# Patient Record
Sex: FEMALE | Race: WHITE | NOT HISPANIC OR LATINO | Employment: FULL TIME | URBAN - METROPOLITAN AREA
[De-identification: names, ages, dates, MRNs, and addresses within clinical notes are randomized per-mention and may not be internally consistent; named-entity substitution may affect disease eponyms.]

---

## 2018-04-26 ENCOUNTER — TRANSCRIBE ORDERS (OUTPATIENT)
Dept: ADMINISTRATIVE | Facility: HOSPITAL | Age: 33
End: 2018-04-26

## 2018-04-26 DIAGNOSIS — R22.0 LOCALIZED SWELLING, MASS AND LUMP, HEAD: Primary | ICD-10-CM

## 2018-04-27 ENCOUNTER — HOSPITAL ENCOUNTER (OUTPATIENT)
Dept: ULTRASOUND IMAGING | Facility: HOSPITAL | Age: 33
Discharge: HOME | End: 2018-04-27
Payer: COMMERCIAL

## 2018-04-27 DIAGNOSIS — R22.0 LOCALIZED SWELLING, MASS AND LUMP, HEAD: ICD-10-CM

## 2018-04-27 PROCEDURE — 76536 US EXAM OF HEAD AND NECK: CPT

## 2019-11-08 ENCOUNTER — HOSPITAL ENCOUNTER (EMERGENCY)
Facility: HOSPITAL | Age: 34
Discharge: HOME | End: 2019-11-08
Attending: EMERGENCY MEDICINE | Admitting: EMERGENCY MEDICINE
Payer: COMMERCIAL

## 2019-11-08 ENCOUNTER — APPOINTMENT (EMERGENCY)
Dept: CT IMAGING | Facility: HOSPITAL | Age: 34
End: 2019-11-08
Payer: COMMERCIAL

## 2019-11-08 VITALS
RESPIRATION RATE: 16 BRPM | OXYGEN SATURATION: 98 % | HEART RATE: 78 BPM | TEMPERATURE: 98.6 F | SYSTOLIC BLOOD PRESSURE: 119 MMHG | DIASTOLIC BLOOD PRESSURE: 78 MMHG

## 2019-11-08 DIAGNOSIS — K52.9 GASTROENTERITIS: Primary | ICD-10-CM

## 2019-11-08 LAB
ALBUMIN SERPL BCP-MCNC: 3.6 G/DL (ref 3.5–5)
ALP SERPL-CCNC: 68 U/L (ref 46–116)
ALT SERPL W P-5'-P-CCNC: 15 U/L (ref 12–78)
ANION GAP SERPL CALCULATED.3IONS-SCNC: 10 MMOL/L (ref 4–13)
AST SERPL W P-5'-P-CCNC: 14 U/L (ref 5–45)
B-HCG SERPL-ACNC: <2 MIU/ML
BASOPHILS # BLD AUTO: 0.03 THOUSANDS/ΜL (ref 0–0.1)
BASOPHILS NFR BLD AUTO: 0 % (ref 0–1)
BILIRUB SERPL-MCNC: 0.4 MG/DL (ref 0.2–1)
BILIRUB UR QL STRIP: NEGATIVE
BUN SERPL-MCNC: 9 MG/DL (ref 5–25)
CALCIUM SERPL-MCNC: 8.7 MG/DL (ref 8.3–10.1)
CHLORIDE SERPL-SCNC: 103 MMOL/L (ref 100–108)
CLARITY UR: CLEAR
CO2 SERPL-SCNC: 25 MMOL/L (ref 21–32)
COLOR UR: YELLOW
CREAT SERPL-MCNC: 0.9 MG/DL (ref 0.6–1.3)
EOSINOPHIL # BLD AUTO: 0.12 THOUSAND/ΜL (ref 0–0.61)
EOSINOPHIL NFR BLD AUTO: 2 % (ref 0–6)
ERYTHROCYTE [DISTWIDTH] IN BLOOD BY AUTOMATED COUNT: 13.5 % (ref 11.6–15.1)
EXT PREG TEST URINE: NORMAL
EXT. CONTROL ED NAV: NORMAL
GFR SERPL CREATININE-BSD FRML MDRD: 83 ML/MIN/1.73SQ M
GLUCOSE SERPL-MCNC: 102 MG/DL (ref 65–140)
GLUCOSE UR STRIP-MCNC: NEGATIVE MG/DL
HCT VFR BLD AUTO: 39.6 % (ref 34.8–46.1)
HGB BLD-MCNC: 13 G/DL (ref 11.5–15.4)
HGB UR QL STRIP.AUTO: NEGATIVE
IMM GRANULOCYTES # BLD AUTO: 0.01 THOUSAND/UL (ref 0–0.2)
IMM GRANULOCYTES NFR BLD AUTO: 0 % (ref 0–2)
KETONES UR STRIP-MCNC: NEGATIVE MG/DL
LEUKOCYTE ESTERASE UR QL STRIP: NEGATIVE
LIPASE SERPL-CCNC: 289 U/L (ref 73–393)
LYMPHOCYTES # BLD AUTO: 1.99 THOUSANDS/ΜL (ref 0.6–4.47)
LYMPHOCYTES NFR BLD AUTO: 30 % (ref 14–44)
MCH RBC QN AUTO: 29 PG (ref 26.8–34.3)
MCHC RBC AUTO-ENTMCNC: 32.8 G/DL (ref 31.4–37.4)
MCV RBC AUTO: 88 FL (ref 82–98)
MONOCYTES # BLD AUTO: 0.43 THOUSAND/ΜL (ref 0.17–1.22)
MONOCYTES NFR BLD AUTO: 6 % (ref 4–12)
NEUTROPHILS # BLD AUTO: 4.12 THOUSANDS/ΜL (ref 1.85–7.62)
NEUTS SEG NFR BLD AUTO: 62 % (ref 43–75)
NITRITE UR QL STRIP: NEGATIVE
NRBC BLD AUTO-RTO: 0 /100 WBCS
PH UR STRIP.AUTO: 6 [PH] (ref 4.5–8)
PLATELET # BLD AUTO: 282 THOUSANDS/UL (ref 149–390)
PMV BLD AUTO: 9.9 FL (ref 8.9–12.7)
POTASSIUM SERPL-SCNC: 3.4 MMOL/L (ref 3.5–5.3)
PROT SERPL-MCNC: 7.9 G/DL (ref 6.4–8.2)
PROT UR STRIP-MCNC: NEGATIVE MG/DL
RBC # BLD AUTO: 4.48 MILLION/UL (ref 3.81–5.12)
SODIUM SERPL-SCNC: 138 MMOL/L (ref 136–145)
SP GR UR STRIP.AUTO: 1.01 (ref 1–1.03)
UROBILINOGEN UR QL STRIP.AUTO: 0.2 E.U./DL
WBC # BLD AUTO: 6.7 THOUSAND/UL (ref 4.31–10.16)

## 2019-11-08 PROCEDURE — 96361 HYDRATE IV INFUSION ADD-ON: CPT

## 2019-11-08 PROCEDURE — 83690 ASSAY OF LIPASE: CPT | Performed by: EMERGENCY MEDICINE

## 2019-11-08 PROCEDURE — 36415 COLL VENOUS BLD VENIPUNCTURE: CPT | Performed by: EMERGENCY MEDICINE

## 2019-11-08 PROCEDURE — 84702 CHORIONIC GONADOTROPIN TEST: CPT | Performed by: EMERGENCY MEDICINE

## 2019-11-08 PROCEDURE — 80053 COMPREHEN METABOLIC PANEL: CPT | Performed by: EMERGENCY MEDICINE

## 2019-11-08 PROCEDURE — 81003 URINALYSIS AUTO W/O SCOPE: CPT

## 2019-11-08 PROCEDURE — 96360 HYDRATION IV INFUSION INIT: CPT

## 2019-11-08 PROCEDURE — 81025 URINE PREGNANCY TEST: CPT | Performed by: EMERGENCY MEDICINE

## 2019-11-08 PROCEDURE — 85025 COMPLETE CBC W/AUTO DIFF WBC: CPT | Performed by: EMERGENCY MEDICINE

## 2019-11-08 PROCEDURE — 99284 EMERGENCY DEPT VISIT MOD MDM: CPT | Performed by: EMERGENCY MEDICINE

## 2019-11-08 PROCEDURE — 99284 EMERGENCY DEPT VISIT MOD MDM: CPT

## 2019-11-08 PROCEDURE — 74177 CT ABD & PELVIS W/CONTRAST: CPT

## 2019-11-08 RX ORDER — DICYCLOMINE HCL 20 MG
20 TABLET ORAL EVERY 6 HOURS
Qty: 20 TABLET | Refills: 0 | Status: SHIPPED | OUTPATIENT
Start: 2019-11-08 | End: 2019-11-08 | Stop reason: SDUPTHER

## 2019-11-08 RX ORDER — ONDANSETRON 2 MG/ML
4 INJECTION INTRAMUSCULAR; INTRAVENOUS ONCE
Status: DISCONTINUED | OUTPATIENT
Start: 2019-11-08 | End: 2019-11-08 | Stop reason: HOSPADM

## 2019-11-08 RX ORDER — DICYCLOMINE HCL 20 MG
20 TABLET ORAL EVERY 6 HOURS
Qty: 20 TABLET | Refills: 0 | Status: SHIPPED | OUTPATIENT
Start: 2019-11-08

## 2019-11-08 RX ORDER — ONDANSETRON 4 MG/1
4 TABLET, FILM COATED ORAL EVERY 8 HOURS PRN
Qty: 12 TABLET | Refills: 0 | Status: SHIPPED | OUTPATIENT
Start: 2019-11-08 | End: 2019-11-08 | Stop reason: SDUPTHER

## 2019-11-08 RX ORDER — KETOROLAC TROMETHAMINE 30 MG/ML
15 INJECTION, SOLUTION INTRAMUSCULAR; INTRAVENOUS ONCE
Status: DISCONTINUED | OUTPATIENT
Start: 2019-11-08 | End: 2019-11-08 | Stop reason: HOSPADM

## 2019-11-08 RX ORDER — ONDANSETRON 4 MG/1
4 TABLET, FILM COATED ORAL EVERY 8 HOURS PRN
Qty: 12 TABLET | Refills: 0 | Status: SHIPPED | OUTPATIENT
Start: 2019-11-08 | End: 2019-12-23 | Stop reason: ALTCHOICE

## 2019-11-08 RX ADMIN — IOHEXOL 100 ML: 350 INJECTION, SOLUTION INTRAVENOUS at 15:43

## 2019-11-08 RX ADMIN — SODIUM CHLORIDE 1000 ML: 0.9 INJECTION, SOLUTION INTRAVENOUS at 15:04

## 2019-11-08 NOTE — ED PROVIDER NOTES
History  Chief Complaint   Patient presents with    Abdominal Pain     Pt c/o RLQ abdominal pain since last night  (+) Nausea  Vomiting last night  (+) Diarrhea  27-year-old female comes in for evaluation of abdominal pain patient states that last evening she began to have right lower quadrant abdominal pain that radiated into her right back  Nausea and vomiting and diarrhea as well multiple episodes both yesterday and today  Patient denies fever  Patient does complain of some groin pain as well  Patient does have guarding and rebound exam differential includes kidney stone acute appendicitis ovarian torsion UTI pyelonephritis  History provided by:  Patient   used: No    Abdominal Pain   Pain location:  RLQ  Pain quality: sharp and stabbing    Pain radiates to:  R flank  Pain severity:  Severe  Onset quality:  Sudden  Duration:  1 day  Timing:  Constant  Progression:  Worsening  Chronicity:  New  Context: not alcohol use, not recent illness and not trauma    Ineffective treatments:  None tried  Associated symptoms: diarrhea, nausea and vomiting    Associated symptoms: no anorexia, no chest pain, no cough, no fatigue, no fever, no hematuria and no shortness of breath    Risk factors: no alcohol abuse, no NSAID use and not pregnant        Prior to Admission Medications   Prescriptions Last Dose Informant Patient Reported? Taking? LACTOBACILLUS PO   Yes No   Sig: Take 1 tablet by mouth 2 (two) times a day  Linaclotide (LINZESS) 145 MCG CAPS   Yes No   Sig: Take by mouth Indications: unsure of dose  amoxicillin-clavulanate (AUGMENTIN) 875-125 mg per tablet   Yes No   Sig: Take 1 tablet by mouth 2 (two) times a day  drospirenone-ethinyl estradiol (PIPER) 3-0 02 MG per tablet   Yes No   Sig: Take 1 tablet by mouth daily  levothyroxine 200 mcg tablet   Yes No   Sig: Take 200 mcg by mouth daily  losartan (COZAAR) 50 mg tablet   Yes No   Sig: Take 50 mg by mouth daily  Facility-Administered Medications: None       Past Medical History:   Diagnosis Date    B12 deficiency     Disease of thyroid gland     Hypertension     Sjogren's disease ([facility] [address] )        Past Surgical History:   Procedure Laterality Date    EGD AND COLONOSCOPY N/A 3/25/2016    Procedure: EGD AND COLONOSCOPY;  Surgeon: Duncan Quiros MD;  Location: [facility]; Service:        History reviewed  No pertinent family history  I have reviewed and agree with the history as documented  Social History     Tobacco Use    Smoking status: Current Some Day Smoker   Substance Use Topics    Alcohol use: Yes    Drug use: No        Review of Systems   Constitutional: Negative for fatigue and fever  HENT: Negative for congestion and ear pain  Eyes: Negative for discharge and redness  Respiratory: Negative for apnea, cough, shortness of breath and wheezing  Cardiovascular: Negative for chest pain  Gastrointestinal: Positive for abdominal pain, diarrhea, nausea and vomiting  Negative for anorexia  Endocrine: Negative for cold intolerance and polydipsia  Genitourinary: Negative for difficulty urinating and hematuria  Musculoskeletal: Negative for arthralgias and back pain  Skin: Negative for color change and rash  Allergic/Immunologic: Negative for environmental allergies and immunocompromised state  Neurological: Negative for numbness and headaches  Hematological: Negative for adenopathy  Does not bruise/bleed easily  Psychiatric/Behavioral: Negative for agitation and behavioral problems  Physical Exam  Physical Exam   Constitutional: She is oriented to person, place, and time  Vital signs are normal  She appears well-developed and well-nourished  Non-toxic appearance  HENT:   Head: Normocephalic and atraumatic     Right Ear: Tympanic membrane and external ear normal    Left Ear: Tympanic membrane and external ear normal    Nose: Nose normal  No rhinorrhea, sinus tenderness or nasal deformity  Mouth/Throat: Uvula is midline and oropharynx is clear and moist  Normal dentition  Eyes: Pupils are equal, round, and reactive to light  Conjunctivae, EOM and lids are normal  Right eye exhibits no discharge  Left eye exhibits no discharge  Neck: Trachea normal and normal range of motion  Neck supple  No JVD present  Carotid bruit is not present  Cardiovascular: Normal rate, regular rhythm, intact distal pulses and normal pulses  No extrasystoles are present  PMI is not displaced  Pulmonary/Chest: Effort normal and breath sounds normal  No accessory muscle usage  No respiratory distress  She has no wheezes  She has no rhonchi  She has no rales  Abdominal: Soft  Normal appearance and bowel sounds are normal  She exhibits no mass  There is tenderness in the right lower quadrant  There is guarding and CVA tenderness  There is no rigidity and no rebound  Musculoskeletal:        Right shoulder: She exhibits normal range of motion, no bony tenderness, no swelling and no deformity  Cervical back: Normal  She exhibits normal range of motion, no tenderness, no bony tenderness and no deformity  Lymphadenopathy:     She has no cervical adenopathy  She has no axillary adenopathy  Neurological: She is alert and oriented to person, place, and time  She has normal strength and normal reflexes  No cranial nerve deficit or sensory deficit  GCS eye subscore is 4  GCS verbal subscore is 5  GCS motor subscore is 6  Skin: Skin is warm and dry  No rash noted  Psychiatric: She has a normal mood and affect  Her speech is normal and behavior is normal    Nursing note and vitals reviewed        Vital Signs  ED Triage Vitals [11/08/19 1340]   Temperature Pulse Respirations Blood Pressure SpO2   98 6 °F (37 °C) 72 16 (!) 147/106 97 %      Temp Source Heart Rate Source Patient Position - Orthostatic VS BP Location FiO2 (%)   Oral Monitor Sitting Left arm --      Pain Score       7           Vitals: 11/08/19 1340 11/08/19 1713   BP: (!) 147/106 119/78   Pulse: 72 78   Patient Position - Orthostatic VS: Sitting Sitting         Visual Acuity      ED Medications  Medications   sodium chloride 0 9 % bolus 1,000 mL (0 mL Intravenous Stopped 11/8/19 1713)   iohexol (OMNIPAQUE) 350 MG/ML injection (MULTI-DOSE) 100 mL (100 mL Intravenous Given 11/8/19 1543)       Diagnostic Studies  Results Reviewed     Procedure Component Value Units Date/Time    ED Urine Macroscopic [[id number]] Collected:  11/08/19 1547    Lab Status:  Final result Specimen:  Urine Updated:  11/08/19 1549     Color, UA Yellow     Clarity, UA Clear     pH, UA 6 0     Leukocytes, UA Negative     Nitrite, UA Negative     Protein, UA Negative mg/dl      Glucose, UA Negative mg/dl      Ketones, UA Negative mg/dl      Urobilinogen, UA 0 2 E U /dl      Bilirubin, UA Negative     Blood, UA Negative     Specific Gravity, UA 1 010    Narrative:       CLINITEK RESULT    POCT pregnancy, urine [[id number]]  (Normal) Resulted:  11/08/19 1544    Lab Status:  Final result Updated:  11/08/19 1544     EXT PREG TEST UR (Ref: Negative) negatvie     Control valid    hCG, quantitative [[id number]]  (Normal) Collected:  11/08/19 1452    Lab Status:  Final result Specimen:  Blood from Arm, Left Updated:  11/08/19 1521     HCG, Quant <2 mIU/mL     Narrative:        Expected Ranges:     Approximate               Approximate HCG  Gestation age          Concentration ( mIU/mL)  _____________          ______________________   Zavala Yawkey                      HCG values  0 2-1                       5-50  1-2                           2-3                         100-5000  3-4                         500-33293  4-5                         1000-55521  5-6                         [id number]  6-8                         79226-562030  8-12                        45140-894569      Lipase [[id number]]  (Normal) Collected:  11/08/19 1452    Lab Status:  Final result Specimen:  Blood from Arm, Left Updated:  11/08/19 1520     Lipase 289 u/L     Comprehensive metabolic panel [[id number]]  (Abnormal) Collected:  11/08/19 1452    Lab Status:  Final result Specimen:  Blood from Arm, Left Updated:  11/08/19 1513     Sodium 138 mmol/L      Potassium 3 4 mmol/L      Chloride 103 mmol/L      CO2 25 mmol/L      ANION GAP 10 mmol/L      BUN 9 mg/dL      Creatinine 0 90 mg/dL      Glucose 102 mg/dL      Calcium 8 7 mg/dL      AST 14 U/L      ALT 15 U/L      Alkaline Phosphatase 68 U/L      Total Protein 7 9 g/dL      Albumin 3 6 g/dL      Total Bilirubin 0 40 mg/dL      eGFR 83 ml/min/1 73sq m     Narrative:       [place] guidelines for Chronic Kidney Disease (CKD):     Stage 1 with normal or high GFR (GFR > 90 mL/min/1 73 square meters)    Stage 2 Mild CKD (GFR = 60-89 mL/min/1 73 square meters)    Stage 3A Moderate CKD (GFR = 45-59 mL/min/1 73 square meters)    Stage 3B Moderate CKD (GFR = 30-44 mL/min/1 73 square meters)    Stage 4 Severe CKD (GFR = 15-29 mL/min/1 73 square meters)    Stage 5 End Stage CKD (GFR <15 mL/min/1 73 square meters)  Note: GFR calculation is accurate only with a steady state creatinine    CBC and differential [[id number]] Collected:  11/08/19 1452    Lab Status:  Final result Specimen:  Blood from Arm, Left Updated:  11/08/19 1459     WBC 6 70 Thousand/uL      RBC 4 48 Million/uL      Hemoglobin 13 0 g/dL      Hematocrit 39 6 %      MCV 88 fL      MCH 29 0 pg      MCHC 32 8 g/dL      RDW 13 5 %      MPV 9 9 fL      Platelets 863 Thousands/uL      nRBC 0 /100 WBCs      Neutrophils Relative 62 %      Immat GRANS % 0 %      Lymphocytes Relative 30 %      Monocytes Relative 6 %      Eosinophils Relative 2 %      Basophils Relative 0 %      Neutrophils Absolute 4 12 Thousands/µL      Immature Grans Absolute 0 01 Thousand/uL      Lymphocytes Absolute 1 99 Thousands/µL      Monocytes Absolute 0 43 Thousand/µL      Eosinophils Absolute 0 12 Thousand/µL Basophils Absolute 0 03 Thousands/µL                  CT abdomen pelvis with contrast   Final Result by Dellis Scheuermann, MD (11/08 1627)      1  Fluid-filled loops of distal ileum, some of which are mildly thickened with mural edema  This may represent inflammatory or infectious enteritis  2   Subserosal uterine fibroid, increased from the prior study  3   Mild free fluid in the pelvis and right lower quadrant mesentery, possibly a combination of inflammatory and physiologic fluid  Workstation performed: [id number]                    Procedures  Procedures       ED Course                               MDM  Number of Diagnoses or Management Options  Gastroenteritis: new and requires workup     Amount and/or Complexity of Data Reviewed  Clinical lab tests: ordered and reviewed  Tests in the radiology section of CPT®: ordered and reviewed  Tests in the medicine section of CPT®: ordered and reviewed  Independent visualization of images, tracings, or specimens: yes    Patient Progress  Patient progress: stable      Disposition  Final diagnoses:   Gastroenteritis     Time reflects when diagnosis was documented in both MDM as applicable and the Disposition within this note     Time User Action Codes Description Comment    11/8/2019  4:45 PM Pool Valdez [K52 9] Gastroenteritis       ED Disposition     ED Disposition Condition Date/Time Comment    Discharge Stable Fri Nov 8, 2019  4:45 PM Genesis Damon discharge to home/self care              Follow-up Information     Follow up With Specialties Details Why Contact Info    Fan Padilla MD Family Medicine Schedule an appointment as soon as possible for a visit   [address] [place] [address] [address] [place] [address]   [phone]            Discharge Medication List as of 11/8/2019  5:09 PM      CONTINUE these medications which have CHANGED    Details   dicyclomine (BENTYL) 20 mg tablet Take 1 tablet (20 mg total) by mouth every 6 (six) hours For crampy abdominal pain, Starting Fri 11/8/2019, Print      ondansetron (ZOFRAN) 4 mg tablet Take 1 tablet (4 mg total) by mouth every 8 (eight) hours as needed for nausea or vomiting, Starting Fri 11/8/2019, Normal         CONTINUE these medications which have NOT CHANGED    Details   amoxicillin-clavulanate (AUGMENTIN) 875-125 mg per tablet Take 1 tablet by mouth 2 (two) times a day , Historical Med      drospirenone-ethinyl estradiol (PIPER) 3-0 02 MG per tablet Take 1 tablet by mouth daily  , Until Discontinued, Historical Med      LACTOBACILLUS PO Take 1 tablet by mouth 2 (two) times a day , Historical Med      levothyroxine 200 mcg tablet Take 200 mcg by mouth daily  , Until Discontinued, Historical Med      Linaclotide (LINZESS) 145 MCG CAPS Take by mouth Indications: unsure of dose , Until Discontinued, Historical Med      losartan (COZAAR) 50 mg tablet Take 50 mg by mouth daily  , Until Discontinued, Historical Med           No discharge procedures on file      ED Provider  Electronically Signed by           Brad Vizcarra DO  11/08/19 1329

## 2019-12-18 ENCOUNTER — OFFICE VISIT (OUTPATIENT)
Dept: GASTROENTEROLOGY | Facility: AMBULARY SURGERY CENTER | Age: 34
End: 2019-12-18
Payer: COMMERCIAL

## 2019-12-18 VITALS
TEMPERATURE: 98 F | BODY MASS INDEX: 31.28 KG/M2 | HEART RATE: 62 BPM | WEIGHT: 170 LBS | SYSTOLIC BLOOD PRESSURE: 110 MMHG | RESPIRATION RATE: 16 BRPM | HEIGHT: 62 IN | DIASTOLIC BLOOD PRESSURE: 62 MMHG

## 2019-12-18 DIAGNOSIS — K59.00 CONSTIPATION, UNSPECIFIED CONSTIPATION TYPE: ICD-10-CM

## 2019-12-18 DIAGNOSIS — R11.0 NAUSEA: ICD-10-CM

## 2019-12-18 DIAGNOSIS — K52.9 ENTERITIS: ICD-10-CM

## 2019-12-18 DIAGNOSIS — K62.5 RECTAL BLEEDING: ICD-10-CM

## 2019-12-18 DIAGNOSIS — R10.84 GENERALIZED ABDOMINAL PAIN: Primary | ICD-10-CM

## 2019-12-18 DIAGNOSIS — R13.19 ESOPHAGEAL DYSPHAGIA: ICD-10-CM

## 2019-12-18 PROBLEM — E03.9 ACQUIRED HYPOTHYROIDISM: Status: ACTIVE | Noted: 2017-03-27

## 2019-12-18 PROBLEM — R73.03 PRE-DIABETES: Status: ACTIVE | Noted: 2017-03-27

## 2019-12-18 PROCEDURE — 99204 OFFICE O/P NEW MOD 45 MIN: CPT | Performed by: INTERNAL MEDICINE

## 2019-12-18 RX ORDER — DROSPIRENONE AND ETHINYL ESTRADIOL 0.02-3(28)
1 KIT ORAL DAILY
COMMUNITY
Start: 2019-04-22 | End: 2019-12-23 | Stop reason: SDUPTHER

## 2019-12-18 RX ORDER — LEVOTHYROXINE SODIUM 100 MCG
TABLET ORAL
COMMUNITY
Start: 2019-11-30

## 2019-12-18 NOTE — PATIENT INSTRUCTIONS
Difficulty swallowing  - schedule EGD  - ask PCP for ENT referral voice changes    Nausea  - schedule EGD  - consider gastric empyting scan in the future    Constipation and abdominal pain  - start miralax 1 capful daily  If after 1 week no improvement, increase to twice per day     - start bisacodyl 5 mg tab every other day as needed for constipation  - start probiotic (Align and Florstar)  - consider trying IBguard (OTC supplement)    Eneteritis on CT  - schedule MR enterography    Follow up 3 months

## 2019-12-18 NOTE — PROGRESS NOTES
[facility]  Luke Colon 27 y o  female MRN: [id number]  Encounter: [id number]          ASSESSMENT AND PLAN:    1  Esophageal dysphagia   - ddx; EoE, esophagitis, stricture/web  - schedule EGD with esophageal bx  - PCP to refer to  ENT for voice changes    2  Nausea   - ddx: GERD vs GOO vs gastroparesis  - schedule EGD  - consider gastric empyting scan in the future    3  Constipation  - start miralax 1 capful daily  If after 1 week no improvement, increase to twice per day  - start bisacodyl 5 mg tab every other day as needed for constipation  - start probiotic (Align and Florstar)    4  Abd pain  - most likely IBS   - treating constipation should improve this   - consider trying IBguard (OTC supplement)    5  Eneteritis on CT  - ddx: infection vs inflammation  - schedule MR enterography    6  Rectal bleeding and pressure  - no bleeding recently  - no ext hemorrhoids or anal fissure on exam  - colonoscopy in 2016  - most likely due to constipation    7  Co-morbidities: Sjogren's syndrome, hypothyroidism, pre-DM, uterine fibroids    Follow up 3 months    ______________________________________________________________________    HPI:  Ms Mendoza Wolf is a 28 yo W who presents for evaluation of dysphagia, nausea, rectal bleeding and pressure, abdominal pain and constipation  Co-morbidities: Sjogren's syndrome, hypothyroidism, pre-DM, uterine fibroids    Had episode of nausea, vomiting, diarrhea and fevers  +cold sweats  No prior episodes  CT showed mild thicking of small bowel  Diagnosed with gastroenteritis and discharged from ER  Dysphagia  Food sticking in sternal notch  Only solids  Progressing and occurring more frequently   +globus sensation  Also notes clearing vocal cords more often and voice straining, voice change  No wt loss per chart  Nausea  With almost every meal, immediately     Doesn't vomit often with nausea but will  vomit about 3x during the week (self induced due to pain)  Does help abd pain  Associated with abd pain  Rectal Bleeding and Pressure  Started 2 mos  Rectal bleeding last episode 1 mos  Bright red blood in toilet  Rectal pressure with BMs and occ throughout the day  Last colonoscopy 2016  Abdominal pain  Epigastric pain, fullness  Also has lower abdominal pain and RLQ pain  Generalized sorness but worse in lower BM  Worse during menstrual cycle  Bowel habits  Alternating constipation and normal BM  1 BM every other day  BSS 1  Straining  Takes aloe vera pills to help with constipation  Has never taken miralax daily  Tried linzess a few years ago and only helpful for a few weeks  No pregnancies  Low back pain recently  REVIEW OF SYSTEMS:    CONSTITUTIONAL: Denies any fever, chills, rigors, and weight loss  HEENT: No earache or tinnitus  Denies hearing loss or visual disturbances  CARDIOVASCULAR: No chest pain or palpitations  RESPIRATORY: Denies any cough, hemoptysis, shortness of breath or dyspnea on exertion  GASTROINTESTINAL: As noted in the History of Present Illness  GENITOURINARY: No problems with urination  Denies any hematuria or dysuria  NEUROLOGIC: No dizziness or vertigo, denies headaches  MUSCULOSKELETAL: Denies any muscle or joint pain  SKIN: Denies skin rashes or itching  ENDOCRINE: Denies excessive thirst  Denies intolerance to heat or cold  PSYCHOSOCIAL: Denies depression or anxiety  Denies any recent memory loss  Historical Information   Past Medical History:   Diagnosis Date    B12 deficiency     Disease of thyroid gland     Hypertension     Sjogren's disease ([facility] [address] )      Past Surgical History:   Procedure Laterality Date    EGD AND COLONOSCOPY N/A 3/25/2016    Procedure: EGD AND COLONOSCOPY;  Surgeon: Pennie Garcia MD;  Location: [facility];   Service:      Social History   Social History     Substance and Sexual Activity   Alcohol Use Not Currently     Social History     Substance and Sexual Activity   Drug Use No     Social History     Tobacco Use   Smoking Status Former Smoker    Types: Cigarettes    Start date: 6/18/2019   Smokeless Tobacco Never Used     History reviewed  No pertinent family history  Meds/Allergies       Current Outpatient Medications:     drospirenone-ethinyl estradiol (PIPER) 3-0 02 MG per tablet    levothyroxine 200 mcg tablet    amoxicillin-clavulanate (AUGMENTIN) 875-125 mg per tablet    dicyclomine (BENTYL) 20 mg tablet    drospirenone-ethinyl estradiol (PIPER) 3-0 02 MG per tablet    LACTOBACILLUS PO    Linaclotide (LINZESS) 145 MCG CAPS    losartan (COZAAR) 50 mg tablet    ondansetron (ZOFRAN) 4 mg tablet    SYNTHROID 100 MCG tablet    Allergies   Allergen Reactions    Peanut Oil Cough and Other (See Comments)     oral    Peppermint Oil Cough, Hives, Itching, Other (See Comments), Rash and Swelling     Sneezing  Objective     Blood pressure 110/62, pulse 62, temperature 98 °F (36 7 °C), temperature source Tympanic, resp  rate 16, height 5' 2" (1 575 m), weight 77 1 kg (170 lb)  Body mass index is 31 09 kg/m²  PHYSICAL EXAM:      General Appearance:   Alert, cooperative, no distress   HEENT:   Normocephalic, atraumatic, anicteric  Neck:  Supple, symmetrical, trachea midline   Lungs:   Clear to auscultation bilaterally; no rales, rhonchi or wheezing; respirations unlabored    Heart[de-identified]   Regular rate and rhythm; no murmur, rub, or gallop     Abdomen:   Soft, non-tender, non-distended; normal bowel sounds; no masses, no organomegaly    Rectal:   +anal wink, no anal fissure, appropriate perineal descent, strong squeeze, appropriate anal relaxation with valsalva   Neuro:   Alert, oriented, no gross deficits, normal strength and tone   Extremities:  No cyanosis, clubbing or edema    Psych:  Normal mood and affect    Skin:  No jaundice, rashes, or lesions          Lab Results:   No visits with results within 1 Day(s) from this visit     Latest known visit with results is:   Admission on 11/08/2019, Discharged on 11/08/2019   Component Date Value    WBC 11/08/2019 6 70     RBC 11/08/2019 4 48     Hemoglobin 11/08/2019 13 0     Hematocrit 11/08/2019 39 6     MCV 11/08/2019 88     MCH 11/08/2019 29 0     MCHC 11/08/2019 32 8     RDW 11/08/2019 13 5     MPV 11/08/2019 9 9     Platelets 75/45/9001 282     nRBC 11/08/2019 0     Neutrophils Relative 11/08/2019 62     Immat GRANS % 11/08/2019 0     Lymphocytes Relative 11/08/2019 30     Monocytes Relative 11/08/2019 6     Eosinophils Relative 11/08/2019 2     Basophils Relative 11/08/2019 0     Neutrophils Absolute 11/08/2019 4 12     Immature Grans Absolute 11/08/2019 0 01     Lymphocytes Absolute 11/08/2019 1 99     Monocytes Absolute 11/08/2019 0 43     Eosinophils Absolute 11/08/2019 0 12     Basophils Absolute 11/08/2019 0 03     Sodium 11/08/2019 138     Potassium 11/08/2019 3 4*    Chloride 11/08/2019 103     CO2 11/08/2019 25     ANION GAP 11/08/2019 10     BUN 11/08/2019 9     Creatinine 11/08/2019 0 90     Glucose 11/08/2019 102     Calcium 11/08/2019 8 7     AST 11/08/2019 14     ALT 11/08/2019 15     Alkaline Phosphatase 11/08/2019 68     Total Protein 11/08/2019 7 9     Albumin 11/08/2019 3 6     Total Bilirubin 11/08/2019 0 40     eGFR 11/08/2019 83     Lipase 11/08/2019 289     EXT PREG TEST UR (Ref: N* 11/08/2019 negatvie     Control 11/08/2019 valid     HCG, Quant 11/08/2019 <2     Color, UA 11/08/2019 Yellow     Clarity, UA 11/08/2019 Clear     pH, UA 11/08/2019 6 0     Leukocytes, UA 11/08/2019 Negative     Nitrite, UA 11/08/2019 Negative     Protein, UA 11/08/2019 Negative     Glucose, UA 11/08/2019 Negative     Ketones, UA 11/08/2019 Negative     Urobilinogen, UA 11/08/2019 0 2     Bilirubin, UA 11/08/2019 Negative     Blood, UA 11/08/2019 Negative     Specific Gravity, UA 11/08/2019 1 010 Radiology Results:   CT A/P with contrast 11/8/19  1  Fluid-filled loops of distal ileum, some of which are mildly thickened with mural edema  This may represent inflammatory or infectious enteritis  2   Subserosal uterine fibroid, increased from the prior study  3   Mild free fluid in the pelvis and right lower quadrant mesentery, possibly a combination of inflammatory and physiologic fluid      Endoscopies:

## 2019-12-19 ENCOUNTER — ANESTHESIA EVENT (OUTPATIENT)
Dept: GASTROENTEROLOGY | Facility: AMBULARY SURGERY CENTER | Age: 34
End: 2019-12-19

## 2019-12-23 ENCOUNTER — ANESTHESIA (OUTPATIENT)
Dept: GASTROENTEROLOGY | Facility: AMBULARY SURGERY CENTER | Age: 34
End: 2019-12-23

## 2019-12-23 ENCOUNTER — HOSPITAL ENCOUNTER (OUTPATIENT)
Dept: GASTROENTEROLOGY | Facility: AMBULARY SURGERY CENTER | Age: 34
Setting detail: OUTPATIENT SURGERY
Discharge: HOME | End: 2019-12-23
Attending: INTERNAL MEDICINE | Admitting: INTERNAL MEDICINE
Payer: COMMERCIAL

## 2019-12-23 VITALS
HEART RATE: 80 BPM | TEMPERATURE: 98 F | HEIGHT: 63 IN | BODY MASS INDEX: 30.12 KG/M2 | SYSTOLIC BLOOD PRESSURE: 133 MMHG | DIASTOLIC BLOOD PRESSURE: 78 MMHG | WEIGHT: 170 LBS | RESPIRATION RATE: 18 BRPM | OXYGEN SATURATION: 99 %

## 2019-12-23 DIAGNOSIS — K52.9 ENTERITIS: ICD-10-CM

## 2019-12-23 LAB
EXT PREGNANCY TEST URINE: NEGATIVE
EXT. CONTROL: NORMAL

## 2019-12-23 PROCEDURE — 88305 TISSUE EXAM BY PATHOLOGIST: CPT | Performed by: PATHOLOGY

## 2019-12-23 PROCEDURE — 81025 URINE PREGNANCY TEST: CPT | Performed by: ANESTHESIOLOGY

## 2019-12-23 PROCEDURE — 43239 EGD BIOPSY SINGLE/MULTIPLE: CPT | Performed by: INTERNAL MEDICINE

## 2019-12-23 RX ORDER — SODIUM CHLORIDE, SODIUM LACTATE, POTASSIUM CHLORIDE, CALCIUM CHLORIDE 600; 310; 30; 20 MG/100ML; MG/100ML; MG/100ML; MG/100ML
125 INJECTION, SOLUTION INTRAVENOUS CONTINUOUS
Status: DISCONTINUED | OUTPATIENT
Start: 2019-12-23 | End: 2019-12-27 | Stop reason: HOSPADM

## 2019-12-23 RX ORDER — LIDOCAINE HYDROCHLORIDE 10 MG/ML
INJECTION, SOLUTION EPIDURAL; INFILTRATION; INTRACAUDAL; PERINEURAL AS NEEDED
Status: DISCONTINUED | OUTPATIENT
Start: 2019-12-23 | End: 2019-12-23 | Stop reason: SURG

## 2019-12-23 RX ORDER — PROPOFOL 10 MG/ML
INJECTION, EMULSION INTRAVENOUS AS NEEDED
Status: DISCONTINUED | OUTPATIENT
Start: 2019-12-23 | End: 2019-12-23 | Stop reason: SURG

## 2019-12-23 RX ADMIN — PROPOFOL 50 MG: 10 INJECTION, EMULSION INTRAVENOUS at 12:32

## 2019-12-23 RX ADMIN — PROPOFOL 50 MG: 10 INJECTION, EMULSION INTRAVENOUS at 12:33

## 2019-12-23 RX ADMIN — PROPOFOL 50 MG: 10 INJECTION, EMULSION INTRAVENOUS at 12:38

## 2019-12-23 RX ADMIN — LIDOCAINE HYDROCHLORIDE 80 MG: 10 INJECTION, SOLUTION EPIDURAL; INFILTRATION; INTRACAUDAL; PERINEURAL at 12:29

## 2019-12-23 RX ADMIN — PROPOFOL 50 MG: 10 INJECTION, EMULSION INTRAVENOUS at 12:41

## 2019-12-23 RX ADMIN — PROPOFOL 50 MG: 10 INJECTION, EMULSION INTRAVENOUS at 12:30

## 2019-12-23 RX ADMIN — PROPOFOL 100 MG: 10 INJECTION, EMULSION INTRAVENOUS at 12:29

## 2019-12-23 RX ADMIN — PROPOFOL 50 MG: 10 INJECTION, EMULSION INTRAVENOUS at 12:35

## 2019-12-23 RX ADMIN — SODIUM CHLORIDE, SODIUM LACTATE, POTASSIUM CHLORIDE, AND CALCIUM CHLORIDE: .6; .31; .03; .02 INJECTION, SOLUTION INTRAVENOUS at 11:57

## 2019-12-23 NOTE — ANESTHESIA PREPROCEDURE EVALUATION
Review of Systems/Medical History  Patient summary reviewed  Chart reviewed  No history of anesthetic complications     Cardiovascular  Exercise tolerance (METS): >4,  Hypertension controlled,    Pulmonary  Negative pulmonary ROS Not a smoker ,        GI/Hepatic    GERD well controlled,        Negative  ROS        Endo/Other  History of thyroid disease , hypothyroidism,      GYN  Negative gynecology ROS Not currently pregnant ,          Hematology  Anemia ,     Musculoskeletal  Negative musculoskeletal ROS   Comment: Sjogren's      Neurology  Negative neurology ROS      Psychology   Negative psychology ROS              Physical Exam    Airway    Mallampati score: II  TM Distance: >3 FB  Neck ROM: full     Dental   No notable dental hx     Cardiovascular  Rhythm: regular, Rate: normal, Cardiovascular exam normal    Pulmonary  Pulmonary exam normal Breath sounds clear to auscultation,     Other Findings        Anesthesia Plan  ASA Score- 2     Anesthesia Type- general with ASA Monitors  Additional Monitors:   Airway Plan:         Plan Factors-  Patient did not smoke on day of surgery  Induction- intravenous  Postoperative Plan-     Informed Consent- Anesthetic plan and risks discussed with patient  I personally reviewed this patient with the CRNA  Discussed and agreed on the Anesthesia Plan with the CRNA  Simeon Botello

## 2019-12-23 NOTE — H&P
History and Physical - [facility]  Mady Blackmon 27 y o  female MRN: [id number]                  HPI: Mady Blackmon is a 27y o  year old female who presents for esophageal dysphagia and nausea  REVIEW OF SYSTEMS: Per the HPI, and otherwise unremarkable  Historical Information   Past Medical History:   Diagnosis Date    B12 deficiency     Disease of thyroid gland     GERD (gastroesophageal reflux disease)     Hypertension     Sjogren's disease ([address] )      Past Surgical History:   Procedure Laterality Date    EGD AND COLONOSCOPY N/A 3/25/2016    Procedure: EGD AND COLONOSCOPY;  Surgeon: Apurva Cano MD;  Location: [facility]; Service:      Social History   Social History     Substance and Sexual Activity   Alcohol Use Not Currently    Frequency: Monthly or less    Drinks per session: 3 or 4    Binge frequency: Never     Social History     Substance and Sexual Activity   Drug Use No     Social History     Tobacco Use   Smoking Status Former Smoker    Types: Cigarettes    Start date: 6/18/2019   Smokeless Tobacco Never Used     History reviewed  No pertinent family history  Meds/Allergies       (Not in a hospital admission)    Allergies   Allergen Reactions    Peanut Oil Cough and Other (See Comments)     oral    Peppermint Oil Cough, Hives, Itching, Other (See Comments), Rash and Swelling     Sneezing  Objective     /82   Pulse 79   Temp 97 7 °F (36 5 °C) (Temporal)   Resp 18   Ht 5' 3" (1 6 m)   Wt 77 1 kg (170 lb)   LMP 12/12/2019   SpO2 99%   Breastfeeding? No   BMI 30 11 kg/m²       PHYSICAL EXAM    Gen: NAD  CV: RRR  CHEST: Clear  ABD: soft, NT/ND  EXT: no edema      ASSESSMENT/PLAN:  This is a 27y o  year old female here for esophageal dysphagia and nausea, and she is stable and optimized for her procedure

## 2020-01-10 DIAGNOSIS — K22.10 ESOPHAGITIS, EROSIVE: Primary | ICD-10-CM

## 2020-01-10 RX ORDER — PANTOPRAZOLE SODIUM 40 MG/1
40 TABLET, DELAYED RELEASE ORAL DAILY
Qty: 30 TABLET | Refills: 3 | Status: SHIPPED | OUTPATIENT
Start: 2020-01-10 | End: 2020-03-05 | Stop reason: DRUGHIGH

## 2020-01-13 ENCOUNTER — TELEPHONE (OUTPATIENT)
Dept: GASTROENTEROLOGY | Facility: AMBULARY SURGERY CENTER | Age: 35
End: 2020-01-13

## 2020-01-13 NOTE — TELEPHONE ENCOUNTER
Spoke to patient went over results, also told patient to start taking pantoprazole 40mg daily,  Wait 30min   To eat breakfast

## 2020-01-13 NOTE — TELEPHONE ENCOUNTER
Sent in error to clerical----- Message from [facility], MD sent at 1/10/2020  4:34 PM EST -----  Hello,     Please let pt know that her esophageal biopsies were normal except for mild chronic inflammation  I would recommend starting pantoprazole 40 mg daily (wait 30 min to eat breakfast)  I've put in a prescription for omeprazole      Thank you,   Hoa Harden

## 2020-02-04 NOTE — ANESTHESIA POSTPROCEDURE EVALUATION
Post-Op Assessment Note    CV Status:  Stable  Pain Score: 0    Pain management: adequate     Mental Status:  Awake   Hydration Status:  Euvolemic   PONV Controlled:  Controlled   Airway Patency:  Patent   Post Op Vitals Reviewed: Yes      Staff: CRNA           BP      Temp      Pulse     Resp      SpO2 The patient is a 72y Male complaining of back pain general.

## 2020-03-05 ENCOUNTER — OFFICE VISIT (OUTPATIENT)
Dept: GASTROENTEROLOGY | Facility: AMBULARY SURGERY CENTER | Age: 35
End: 2020-03-05
Payer: COMMERCIAL

## 2020-03-05 VITALS
RESPIRATION RATE: 16 BRPM | SYSTOLIC BLOOD PRESSURE: 120 MMHG | BODY MASS INDEX: 30.3 KG/M2 | TEMPERATURE: 98.9 F | DIASTOLIC BLOOD PRESSURE: 80 MMHG | WEIGHT: 171 LBS | HEIGHT: 63 IN | HEART RATE: 80 BPM

## 2020-03-05 DIAGNOSIS — R11.0 NAUSEA: ICD-10-CM

## 2020-03-05 DIAGNOSIS — K62.5 RECTAL BLEEDING: ICD-10-CM

## 2020-03-05 DIAGNOSIS — R09.89 GLOBUS SENSATION: ICD-10-CM

## 2020-03-05 DIAGNOSIS — K21.9 GASTROESOPHAGEAL REFLUX DISEASE WITHOUT ESOPHAGITIS: Primary | ICD-10-CM

## 2020-03-05 DIAGNOSIS — A09 DIARRHEA OF INFECTIOUS ORIGIN: ICD-10-CM

## 2020-03-05 DIAGNOSIS — R10.31 RIGHT LOWER QUADRANT ABDOMINAL PAIN: ICD-10-CM

## 2020-03-05 PROCEDURE — 99213 OFFICE O/P EST LOW 20 MIN: CPT | Performed by: INTERNAL MEDICINE

## 2020-03-05 RX ORDER — PANTOPRAZOLE SODIUM 20 MG/1
20 TABLET, DELAYED RELEASE ORAL DAILY
Qty: 30 TABLET | Refills: 3 | Status: SHIPPED | OUTPATIENT
Start: 2020-03-05

## 2020-03-05 NOTE — PROGRESS NOTES
[facility] - Outpatient Consultation  Vin Bain 38 y o  female MRN: [id number]  Encounter: [id number]          ASSESSMENT AND PLAN:    1  Globus  - still having to clear vocal cords and strain voice, voice changes  - referred to ENT  - start PPI 20 qd   - lifestyle changes to reduce acid reflux    2  Nausea  - ddx: GERD vs gastroparesis  - normal EGD  - consider gastric empyting scan in the future  - start PPI 20 qd   - lifestyle changes to reduce acid reflux    3  Alternating bowel habits   - start fiber supplement (metamucil, citrucel, benefiber) daily (over the counter)  - continue aloe pills daily  - check stool for infection and inflammation (enteric panel, c diff, giardia, ova and parasites, fecal calprotectin)    4  Abd pain  - most likely IBS +/- endometriosis  - consider trying IBguard (OTC supplement)  - schedule MR enterography    5  Eneteritis on CT  - ddx: infection vs inflammation  - schedule MR enterography    6  Rectal bleeding and pressure  - most likely due to internal hemorrhoids  - no ext hemorrhoids or anal fissure on exam  - colonoscopy in 2016  - start anusol suppository nightly for 5 nights  - if continues consider colonoscopy    7  Co-morbidities: Sjogren's syndrome, hypothyroidism, pre-DM, uterine fibroids    Follow up 3 months    __________________________________________________________________    HPI:  Ms Candace Gary is a 28 yo W who presents for follow up of dysphagia, nausea, rectal bleeding and pressure, abdominal pain and constipation  Co-morbidities: Sjogren's syndrome, hypothyroidism, pre-DM, uterine fibroids    Since last visit had EGD that showed irregular z-line (biopsied), normal esophagus s/p biopsied  Normal stomach and duodenum  Path of lower esophagus and GEJ was negative for eosinophilic esophagitis or Barretts but did show mild subacute and chronic inflammation  MR enterography not done yet        Had episode of nausea, vomiting, diarrhea and fevers 11/2019  +cold sweats  No prior episodes  CT showed mild thicking of small bowel  Diagnosed with gastroenteritis and discharged from ER  Dysphagia- resolved for the most part  No food sticking  Did not start pantoprazole 40 qd    +mild globus sensation  Also notes clearing vocal cords more often and voice straining, voice change intermittently; has not seen ENT  No wt loss  Nausea  - intermittent, 2x per  - worse around period  - usually occurs right after meals  - has been vomiting too  - associated with abdominal pain    Abdominal pain  - RLQ  - 1-2x per mos, pain lasts a few day   - sensitive in lower abdomen  - worse around menstrual cycle    Rectal Bleeding and Pressure  - started 3 mos ago  - occurs 1x per week  - denies straining  - red blood with wiping and in bowel (a few drop to a tsp to tbsp)  - also notes some mucus  - last colonoscopy 2016 with only inter hemorrhoids     Bowel habits  Alternating constipation and normal BM  1 BM every other day  BSS 2-6  Straining  Has pudding like stool about 2-3x per week  Takes aloe vera pills to help with constipation  Tried miralax 1 capful a day and it helped but stopped working so switched to aloe vera pills  Tried linzess a few years ago and only helpful for a few weeks  No pregnancies  Low back pain recently  REVIEW OF SYSTEMS:    CONSTITUTIONAL: Denies any fever, chills, rigors, and weight loss  HEENT: No earache or tinnitus  Denies hearing loss or visual disturbances  CARDIOVASCULAR: No chest pain or palpitations  RESPIRATORY: Denies any cough, hemoptysis, shortness of breath or dyspnea on exertion  GASTROINTESTINAL: As noted in the History of Present Illness  GENITOURINARY: No problems with urination  Denies any hematuria or dysuria  NEUROLOGIC: No dizziness or vertigo, denies headaches  MUSCULOSKELETAL: Denies any muscle or joint pain  SKIN: Denies skin rashes or itching     ENDOCRINE: Denies excessive thirst  Denies intolerance to heat or cold  PSYCHOSOCIAL: Denies depression or anxiety  Denies any recent memory loss  Historical Information   Past Medical History:   Diagnosis Date    B12 deficiency     Disease of thyroid gland     GERD (gastroesophageal reflux disease)     Hypertension     Sjogren's disease ([facility] [address] )      Past Surgical History:   Procedure Laterality Date    EGD AND COLONOSCOPY N/A 3/25/2016    Procedure: EGD AND COLONOSCOPY;  Surgeon: Leobardo Huff MD;  Location: [facility]; Service:      Social History   Social History     Substance and Sexual Activity   Alcohol Use Not Currently    Frequency: Monthly or less    Drinks per session: 3 or 4    Binge frequency: Never     Social History     Substance and Sexual Activity   Drug Use No     Social History     Tobacco Use   Smoking Status Former Smoker    Types: Cigarettes    Start date: 6/18/2019   Smokeless Tobacco Never Used     History reviewed  No pertinent family history  Meds/Allergies       Current Outpatient Medications:     SYNTHROID 100 MCG tablet    dicyclomine (BENTYL) 20 mg tablet    drospirenone-ethinyl estradiol (PIPER) 3-0 02 MG per tablet    LACTOBACILLUS PO    pantoprazole (PROTONIX) 40 mg tablet    Allergies   Allergen Reactions    Peanut Oil Cough and Other (See Comments)     oral    Peppermint Oil Cough, Hives, Itching, Other (See Comments), Rash and Swelling     Sneezing  Objective     Blood pressure 120/80, pulse 80, temperature 98 9 °F (37 2 °C), temperature source Tympanic, resp  rate 16, height 5' 3" (1 6 m), weight 77 6 kg (171 lb), not currently breastfeeding  Body mass index is 30 29 kg/m²  PHYSICAL EXAM:      General Appearance:   Alert, cooperative, no distress   HEENT:   Normocephalic, atraumatic, anicteric       Neck:  Supple, symmetrical, trachea midline   Lungs:   Clear to auscultation bilaterally; no rales, rhonchi or wheezing; respirations unlabored    Heart[de-identified] Regular rate and rhythm; no murmur, rub, or gallop  Abdomen:   Soft, non-tender, non-distended; normal bowel sounds; no masses, no organomegaly    Rectal:   Not performed   Neuro:   Alert, oriented, no gross deficits, normal strength and tone   Extremities:  No cyanosis, clubbing or edema    Psych:  Normal mood and affect    Skin:  No jaundice, rashes, or lesions          Lab Results:   No visits with results within 1 Day(s) from this visit  Latest known visit with results is:   Hospital Outpatient Visit on 12/23/2019   Component Date Value    EXT Preg Test, Ur 12/23/2019 Negative     Control 12/23/2019 Valid     Case Report 12/23/2019                      Value:Surgical Pathology Report                         Case: [id number]                                   Authorizing Provider:  Danilo Joiner MD  Collected:           12/23/2019 1240              Ordering Location:     [facility]        Received:            12/23/2019 [address] [facility]                                                           Pathologist:           Manuel Redmond MD                                                         Specimens:   A) - Esophagus, lower esophagus bx  B) - Esophagus, distal esophagus bx   Final Diagnosis 12/23/2019                      Value: This result contains rich text formatting which cannot be displayed here   Additional Information 12/23/2019                      Value: This result contains rich text formatting which cannot be displayed here  Rosha Romerogel Gross Description 12/23/2019                      Value: This result contains rich text formatting which cannot be displayed here   Clinical Information 12/23/2019                      Value:Irregular Z line       Radiology Results:   CT A/P with contrast 11/8/19  1    Fluid-filled loops of distal ileum, some of which are mildly thickened with mural edema  This may represent inflammatory or infectious enteritis  2   Subserosal uterine fibroid, increased from the prior study  3   Mild free fluid in the pelvis and right lower quadrant mesentery, possibly a combination of inflammatory and physiologic fluid      Endoscopies:   EGD 3/2016- nonerosive gastritis s/p biopeis  Colonoscopy 3/2016- grade II internal hemorrhoids

## 2020-03-05 NOTE — PATIENT INSTRUCTIONS
Globus sensation and voice straining/changes  - start pantoprazole 20 daily with plan of continuing for about 3 months  - try lifestyle changes to reduce acid reflux: cut down on caffeine (coffee, tea, soda, chocolate), peppermint, spicy/greasy foods; avoid laying down for 2-3 hours after meals; elevate the head of your bed; avoid tight clothing around abdomen and lose weight    Abdominal pain  - schedule MR enterography    Alternating bowel habits   - start fiber supplement (metamucil, citrucel, benefiber) daily (over the counter)  - continue aloe pills daily  - check stool for infection and inflammation (enteric panel, c diff, giardia, ova and parasites, fecal calprotectin)    Rectal bleeding most likely due to internal hemorrhoids  - start anusol suppository nightly for 5 nights    Follow up in 3 months

## 2020-03-06 ENCOUNTER — TELEPHONE (OUTPATIENT)
Dept: GASTROENTEROLOGY | Facility: AMBULARY SURGERY CENTER | Age: 35
End: 2020-03-06

## 2020-05-29 ENCOUNTER — TELEPHONE (OUTPATIENT)
Dept: GASTROENTEROLOGY | Facility: AMBULARY SURGERY CENTER | Age: 35
End: 2020-05-29

## 2020-06-03 ENCOUNTER — TELEPHONE (OUTPATIENT)
Dept: GASTROENTEROLOGY | Facility: AMBULARY SURGERY CENTER | Age: 35
End: 2020-06-03

## 2020-07-17 ENCOUNTER — HOSPITAL ENCOUNTER (OUTPATIENT)
Dept: RADIOLOGY | Facility: HOSPITAL | Age: 35
Discharge: HOME | End: 2020-07-17
Attending: INTERNAL MEDICINE
Payer: COMMERCIAL

## 2020-07-17 DIAGNOSIS — K52.9 ENTERITIS: ICD-10-CM

## 2020-07-17 PROCEDURE — A9585 GADOBUTROL INJECTION: HCPCS | Performed by: INTERNAL MEDICINE

## 2020-07-17 PROCEDURE — 72197 MRI PELVIS W/O & W/DYE: CPT

## 2020-07-17 PROCEDURE — 74183 MRI ABD W/O CNTR FLWD CNTR: CPT

## 2020-07-17 RX ADMIN — GADOBUTROL 8 ML: 604.72 INJECTION INTRAVENOUS at 10:35

## 2020-07-17 RX ADMIN — GLUCAGON HYDROCHLORIDE 1 MG: KIT at 10:15

## 2020-08-07 ENCOUNTER — TELEPHONE (OUTPATIENT)
Dept: GASTROENTEROLOGY | Facility: AMBULARY SURGERY CENTER | Age: 35
End: 2020-08-07

## 2020-08-07 NOTE — TELEPHONE ENCOUNTER
----- Message from [facility], MD sent at 8/6/2020  2:46 PM EDT -----  MRI of the abdomen pelvis was normal   Previously seen small bowel wall thickening in November on CT has resolved and most likely that was due to infection      Thank you

## 2020-09-24 ENCOUNTER — TRANSCRIBE ORDERS (OUTPATIENT)
Dept: LAB | Facility: CLINIC | Age: 35
End: 2020-09-24

## 2020-09-24 DIAGNOSIS — Z71.84 COUNSELING FOR TRAVEL: Primary | ICD-10-CM

## 2020-10-06 DIAGNOSIS — Z71.84 COUNSELING FOR TRAVEL: ICD-10-CM

## 2020-10-06 PROCEDURE — U0003 INFECTIOUS AGENT DETECTION BY NUCLEIC ACID (DNA OR RNA); SEVERE ACUTE RESPIRATORY SYNDROME CORONAVIRUS 2 (SARS-COV-2) (CORONAVIRUS DISEASE [COVID-19]), AMPLIFIED PROBE TECHNIQUE, MAKING USE OF HIGH THROUGHPUT TECHNOLOGIES AS DESCRIBED BY CMS-2020-01-R: HCPCS | Performed by: FAMILY MEDICINE

## 2020-10-07 LAB — SARS-COV-2 RNA SPEC QL NAA+PROBE: NOT DETECTED

## 2021-04-10 ENCOUNTER — IMMUNIZATIONS (OUTPATIENT)
Dept: FAMILY MEDICINE CLINIC | Facility: HOSPITAL | Age: 36
End: 2021-04-10

## 2021-04-10 DIAGNOSIS — Z23 ENCOUNTER FOR IMMUNIZATION: Primary | ICD-10-CM

## 2021-04-10 PROCEDURE — 91300 SARS-COV-2 / COVID-19 MRNA VACCINE (PFIZER-BIONTECH) 30 MCG: CPT

## 2021-04-10 PROCEDURE — 0001A SARS-COV-2 / COVID-19 MRNA VACCINE (PFIZER-BIONTECH) 30 MCG: CPT

## 2021-05-05 ENCOUNTER — IMMUNIZATIONS (OUTPATIENT)
Dept: FAMILY MEDICINE CLINIC | Facility: HOSPITAL | Age: 36
End: 2021-05-05

## 2021-05-05 DIAGNOSIS — Z23 ENCOUNTER FOR IMMUNIZATION: Primary | ICD-10-CM

## 2021-05-05 PROCEDURE — 91300 SARS-COV-2 / COVID-19 MRNA VACCINE (PFIZER-BIONTECH) 30 MCG: CPT

## 2021-05-05 PROCEDURE — 0002A SARS-COV-2 / COVID-19 MRNA VACCINE (PFIZER-BIONTECH) 30 MCG: CPT

## 2021-10-08 ENCOUNTER — HOSPITAL ENCOUNTER (OUTPATIENT)
Dept: RADIOLOGY | Facility: HOSPITAL | Age: 36
Discharge: HOME | End: 2021-10-08
Payer: COMMERCIAL

## 2021-10-08 DIAGNOSIS — R22.9 LOCALIZED SWELLING, MASS AND LUMP, UNSPECIFIED: ICD-10-CM

## 2021-10-08 PROCEDURE — 76882 US LMTD JT/FCL EVL NVASC XTR: CPT

## 2022-09-15 ENCOUNTER — APPOINTMENT (OUTPATIENT)
Dept: RADIOLOGY | Age: 37
End: 2022-09-15
Payer: COMMERCIAL

## 2022-09-15 ENCOUNTER — OFFICE VISIT (OUTPATIENT)
Dept: URGENT CARE | Age: 37
End: 2022-09-15
Payer: COMMERCIAL

## 2022-09-15 VITALS
SYSTOLIC BLOOD PRESSURE: 154 MMHG | OXYGEN SATURATION: 97 % | HEART RATE: 76 BPM | DIASTOLIC BLOOD PRESSURE: 73 MMHG | TEMPERATURE: 97.4 F | RESPIRATION RATE: 20 BRPM

## 2022-09-15 DIAGNOSIS — M25.572 ACUTE LEFT ANKLE PAIN: ICD-10-CM

## 2022-09-15 DIAGNOSIS — M25.572 ACUTE LEFT ANKLE PAIN: Primary | ICD-10-CM

## 2022-09-15 DIAGNOSIS — S93.402A SPRAIN OF LEFT ANKLE, UNSPECIFIED LIGAMENT, INITIAL ENCOUNTER: ICD-10-CM

## 2022-09-15 PROCEDURE — 99213 OFFICE O/P EST LOW 20 MIN: CPT

## 2022-09-15 PROCEDURE — 73610 X-RAY EXAM OF ANKLE: CPT

## 2022-09-15 NOTE — PROGRESS NOTES
[facility]        NAME: Curry Olivares is a 44 y o  female  : 1985    MRN: [id number]  DATE: September 15, 2022  TIME: 8:08 PM    Assessment and Plan   Acute left ankle pain [M25 572]  1  Acute left ankle pain  XR ankle 3+ vw left   2  Sprain of left ankle, unspecified ligament, initial encounter      69-year-old female presents for evaluation of left lower extremity tenderness after a fall 1 week ago  X-rays reviewed, no acute abnormality noted, awaiting official read  Suspect left ankle sprain, Ace wrap provided in office  Patient advised to alternate Tylenol and NSAIDs, rest, ice, compression and elevation as needed for pain and swelling  Patient Instructions   Ankle Sprain   WHAT YOU NEED TO KNOW:   An ankle sprain happens when 1 or more ligaments in your ankle joint stretch or tear  Ligaments are tough tissues that connect bones  Ligaments support your joints and keep your bones in place  DISCHARGE INSTRUCTIONS:   Return to the emergency department if:   · You have severe pain in your ankle      · Your foot or toes are cold or numb      · Your ankle becomes more weak or unstable (wobbly)      · You are unable to put any weight on your ankle or foot      · Your swelling has increased or returned      Call your doctor if:   · Your pain does not go away, even after treatment      · You have questions or concerns about your condition or care      Medicines: You may need any of the following:  · NSAIDs , such as ibuprofen, help decrease swelling, pain, and fever  This medicine is available with or without a doctor's order  NSAIDs can cause stomach bleeding or kidney problems in certain people  If you take blood thinner medicine, always ask your healthcare provider if NSAIDs are safe for you  Always read the medicine label and follow directions      · Acetaminophen  decreases pain and fever  It is available without a doctor's order  Ask how much to take and how often to take it   Follow directions  Read the labels of all other medicines you are using to see if they also contain acetaminophen, or ask your doctor or pharmacist  Acetaminophen can cause liver damage if not taken correctly  Do not use more than 4 grams (4,000 milligrams) total of acetaminophen in one day       · Prescription pain medicine  may be given  Ask your healthcare provider how to take this medicine safely  Some prescription pain medicines contain acetaminophen  Do not take other medicines that contain acetaminophen without talking to your healthcare provider  Too much acetaminophen may cause liver damage  Prescription pain medicine may cause constipation  Ask your healthcare provider how to prevent or treat constipation       · Take your medicine as directed  Contact your healthcare provider if you think your medicine is not helping or if you have side effects  Tell him or her if you are allergic to any medicine  Keep a list of the medicines, vitamins, and herbs you take  Include the amounts, and when and why you take them  Bring the list or the pill bottles to follow-up visits  Carry your medicine list with you in case of an emergency      Self-care:   · Use support devices,  such as a brace, cast, or splint, to limit your movement and protect your joint  You may need to use crutches to decrease your pain as you move around      · Go to physical therapy as directed  A physical therapist teaches you exercises to help improve movement and strength, and to decrease pain      · Rest  your ankle so that it can heal  Return to normal activities as directed      · Apply ice  on your ankle for 15 to 20 minutes every hour or as directed  Use an ice pack, or put crushed ice in a plastic bag  Cover it with a towel  Ice helps prevent tissue damage and decreases swelling and pain      · Compress  your ankle  Ask if you should wrap an elastic bandage around your injured ligament   An elastic bandage provides support and helps decrease swelling and movement so your joint can heal  Wear as long as directed           · Elevate  your ankle above the level of your heart as often as you can  This will help decrease swelling and pain  Prop your ankle on pillows or blankets to keep it elevated comfortably         Prevent another ankle sprain:   · Let your ankle heal   Find out how long your ligament needs to heal  Do not do any physical activity until your healthcare provider says it is okay  If you start activity too soon, you may develop a more serious injury      · Always warm up and stretch  before you exercise or play sports      · Use the right equipment  Always wear shoes that fit well and are made for the activity that you are doing  You may also need ankle supports, elbow and knee pads, or braces      Follow up with your doctor as directed:  Write down your questions so you remember to ask them during your visits  © [employer] 2022 Information is for End User's use only and may not be sold, redistributed or otherwise used for commercial purposes  All illustrations and images included in CareNotes® are the copyrighted property of A D A M , Inc  or [address] [place], [place] [address]   The above information is an  only  It is not intended as medical advice for individual conditions or treatments  Talk to your doctor, nurse or pharmacist before following any medical regimen to see if it is safe and effective for you  Follow up with PCP in 3-5 days  Proceed to  ER if symptoms worsen  Chief Complaint     Chief Complaint   Patient presents with    Ankle Injury    Knee Injury    Hip Injury    Foot Injury     Patient stated  she fell down  hurting left ankle, foot, knee and hip  it happened  Thursday           History of Present Illness       Patient is a 60-year-old female with no significant past medical history who presents for evaluation of left hip, knee and ankle tenderness after sustaining a fall walking her dog 1 week ago   She reports that she feels she may have tripped on a piece of fruit lying on the ground and subsequently rolled her left ankle inward and fell on her side  Her left ankle continues to be the greatest source of pain, and she reports intermittent swelling and gait disturbance due to the discomfort  She denies numbness, tingling, knee swelling, joint clicking  She has been taking ibuprofen and applying ice with little relief  Review of Systems   Review of Systems   Constitutional: Negative for activity change, fatigue and fever  HENT: Negative for congestion, postnasal drip, rhinorrhea, sinus pressure, sinus pain, sneezing and sore throat  Eyes: Negative  Respiratory: Negative for apnea, cough, choking, chest tightness, shortness of breath, wheezing and stridor  Cardiovascular: Negative for chest pain, palpitations and leg swelling  Gastrointestinal: Negative  Negative for diarrhea and nausea  Endocrine: Negative  Genitourinary: Negative  Musculoskeletal: Positive for arthralgias, gait problem and joint swelling  Negative for back pain, myalgias, neck pain and neck stiffness  Skin: Negative  Negative for color change and rash  Allergic/Immunologic: Negative  Negative for environmental allergies  Neurological: Negative for dizziness, facial asymmetry, weakness, light-headedness, numbness and headaches  Hematological: Negative  Negative for adenopathy  Psychiatric/Behavioral: Negative            Current Medications       Current Outpatient Medications:     Cholecalciferol 50 MCG (2000 UT) CAPS, Take 1 capsule by mouth, Disp: , Rfl:     cyanocobalamin (VITAMIN B-12) 1000 MCG tablet, Take 2,000 mcg by mouth daily, Disp: , Rfl:     dicyclomine (BENTYL) 20 mg tablet, Take 1 tablet (20 mg total) by mouth every 6 (six) hours For crampy abdominal pain (Patient not taking: Reported on 12/18/2019), Disp: 20 tablet, Rfl: 0    drospirenone-ethinyl estradiol (PIPER) 3-0 02 MG per tablet, Take 1 tablet by mouth daily  , Disp: , Rfl:     Hydrocortisone (Procto-Gabe) 1 % CREA, Insert into the rectum daily at bedtime as needed (hemorrhoidal bleeding), Disp: 28 4 g, Rfl: 0    LACTOBACILLUS PO, Take 1 tablet by mouth 2 (two) times a day , Disp: , Rfl:     levothyroxine 50 mcg tablet, Take 50 mcg by mouth daily, Disp: , Rfl:     pantoprazole (PROTONIX) 20 mg tablet, Take 1 tablet (20 mg total) by mouth daily, Disp: 30 tablet, Rfl: 3    SYNTHROID 100 MCG tablet, , Disp: , Rfl:     Current Allergies     Allergies as of 09/15/2022 - Reviewed 09/15/2022   Allergen Reaction Noted    Peanut oil - food allergy Cough and Other (See Comments) 08/26/2016    Peppermint oil - food allergy Cough, Hives, Itching, Other (See Comments), Rash, and Swelling 08/26/2016            The following portions of the patient's history were reviewed and updated as appropriate: allergies, current medications, past family history, past medical history, past social history, past surgical history and problem list      Past Medical History:   Diagnosis Date    B12 deficiency     Disease of thyroid gland     GERD (gastroesophageal reflux disease)     Hypertension     Sjogren's disease ([facility] [address] )        Past Surgical History:   Procedure Laterality Date    EGD AND COLONOSCOPY N/A 3/25/2016    Procedure: EGD AND COLONOSCOPY;  Surgeon: Horacio Zamarripa MD;  Location: [facility]; Service:        History reviewed  No pertinent family history  Medications have been verified  Objective   /73   Pulse 76   Temp (!) 97 4 °F (36 3 °C) (Temporal)   Resp 20   LMP 09/10/2022 (Exact Date)   SpO2 97%        Physical Exam     Physical Exam  Vitals and nursing note reviewed  Constitutional:       General: She is not in acute distress  Appearance: Normal appearance  She is not ill-appearing, toxic-appearing or diaphoretic  HENT:      Head: Normocephalic and atraumatic        Right Ear: External ear normal       Left Ear: External ear normal       Nose: Nose normal  No congestion or rhinorrhea  Mouth/Throat:      Mouth: Mucous membranes are moist    Eyes:      Extraocular Movements: Extraocular movements intact  Conjunctiva/sclera: Conjunctivae normal       Pupils: Pupils are equal, round, and reactive to light  Cardiovascular:      Rate and Rhythm: Normal rate and regular rhythm  Pulses: Normal pulses  Heart sounds: Normal heart sounds  No murmur heard  No friction rub  No gallop  Pulmonary:      Effort: Pulmonary effort is normal  No respiratory distress  Breath sounds: Normal breath sounds  No stridor  No wheezing, rhonchi or rales  Abdominal:      General: Bowel sounds are normal       Palpations: Abdomen is soft  Tenderness: There is no abdominal tenderness  There is no guarding or rebound  Musculoskeletal:         General: Swelling and tenderness present  Cervical back: Normal range of motion and neck supple  No tenderness  Left upper leg: Tenderness present  Left knee: Bony tenderness present  Tenderness present  Left ankle: Swelling present  No deformity, ecchymosis or lacerations  Tenderness present over the lateral malleolus  Decreased range of motion  Left Achilles Tendon: Normal  No tenderness or defects  Mills's test negative  Legs:    Skin:     General: Skin is warm and dry  Capillary Refill: Capillary refill takes less than 2 seconds  Findings: Abrasion present  Comments: Will healing abrasion of anterior left knee  Neurological:      General: No focal deficit present  Mental Status: She is alert and oriented to person, place, and time  Cranial Nerves: No cranial nerve deficit     Psychiatric:         Mood and Affect: Mood normal          Behavior: Behavior normal

## 2022-09-16 NOTE — PATIENT INSTRUCTIONS
Continue over-the-counter Tylenol/NSAIDs, rest, ice, compression and elevation for swelling  Will trial Voltaren gel for added relief  Follow-up with primary care provider if symptoms do not improve within 1-2 weeks

## 2023-08-14 ENCOUNTER — HOSPITAL ENCOUNTER (OUTPATIENT)
Dept: RADIOLOGY | Facility: HOSPITAL | Age: 38
Discharge: HOME | End: 2023-08-14
Payer: COMMERCIAL

## 2023-08-14 DIAGNOSIS — M54.50 LOW BACK PAIN, UNSPECIFIED BACK PAIN LATERALITY, UNSPECIFIED CHRONICITY, UNSPECIFIED WHETHER SCIATICA PRESENT: ICD-10-CM

## 2023-08-14 PROCEDURE — 72100 X-RAY EXAM L-S SPINE 2/3 VWS: CPT

## 2023-09-07 ENCOUNTER — OFFICE VISIT (OUTPATIENT)
Dept: ENDOCRINOLOGY | Facility: CLINIC | Age: 38
End: 2023-09-07
Payer: COMMERCIAL

## 2023-09-07 ENCOUNTER — TELEPHONE (OUTPATIENT)
Dept: ENDOCRINOLOGY | Facility: CLINIC | Age: 38
End: 2023-09-07

## 2023-09-07 VITALS
OXYGEN SATURATION: 99 % | HEART RATE: 80 BPM | HEIGHT: 62 IN | BODY MASS INDEX: 31.28 KG/M2 | SYSTOLIC BLOOD PRESSURE: 124 MMHG | DIASTOLIC BLOOD PRESSURE: 68 MMHG

## 2023-09-07 DIAGNOSIS — E03.9 ACQUIRED HYPOTHYROIDISM: Primary | ICD-10-CM

## 2023-09-07 DIAGNOSIS — N64.52 BREAST DISCHARGE: ICD-10-CM

## 2023-09-07 DIAGNOSIS — R53.83 OTHER FATIGUE: ICD-10-CM

## 2023-09-07 DIAGNOSIS — E78.2 MODERATE MIXED HYPERLIPIDEMIA NOT REQUIRING STATIN THERAPY: ICD-10-CM

## 2023-09-07 PROCEDURE — 99204 OFFICE O/P NEW MOD 45 MIN: CPT | Performed by: INTERNAL MEDICINE

## 2023-09-07 NOTE — PROGRESS NOTES
New Patient Progress Note      Referring Provider  [employer], [facility]  [facility],  [address]     History of Present Illness:     PT has history of Hashimoto's hypothyroidism, since 20 years ago, saw an endocrinologist in [place] 3 years ago Dr Mercy Lee at [facility], records not available. Currently being managed by PCP, did not take medication for last 3 years but recently started on Synthroid 50 mcg QD on 07/26/2023  PT c/o fatigue, feeling cold and cold intolerance, menorrhagia, chills, arthralgia, difficulty urinating, difficulty losing weight, occasional trouble swelling  No History of external radiation to head/neck/chest.No history of previous hyperthyroidism or thyroid surgery. No recent Iodine loading in form of medication, erbs or kelp supplements or radiological diagnostic studies. Tried taking OTC thyroxine and Selenium OTC 6 months ago but stopped it. No family history of thyroid cancer. Has family history of Hashimoto's disease in brother and father as well as grandfather. Has family history of diabetes mellitus in grandmother. First cousin with breast cancer. No recent Iodine loading in form of medication, erbs or kelp supplements or radiological diagnostic studies. Hyporthyroid on Synthroid 50 mcg  takes 1 hour before Break Fast on   empty stomach, compliant all of the time denies any side effects from medications.  Had sinus infection and nausea initially which got resolved    Patient has recent labs done in July at [address] [place] [address] and in her phone which show:  TSH 9.880 (ref range 0.45-4.5) on 07/10/2023, previously 7.57 in May 2022  FT4 0.86 (ref range 0.82-1.77) previously 1.03 in May 2022  T3 123 (ref range )      Patient Active Problem List   Diagnosis   • Anemia   • Rectal bleeding   • Acquired hypothyroidism   • Chronic constipation   • Chronic GERD   • Pre-diabetes   • Sjogren's syndrome ([address])   • Rectal bleed     Past Medical History: Diagnosis Date   • B12 deficiency    • Disease of thyroid gland    • GERD (gastroesophageal reflux disease)    • Hypertension    • Sjogren's disease ([address])       Past Surgical History:   Procedure Laterality Date   • EGD AND COLONOSCOPY N/A 3/25/2016    Procedure: EGD AND COLONOSCOPY;  Surgeon: Edwardo Iglesias MD;  Location:  GI LAB; Service:       History reviewed. No pertinent family history. Social History     Tobacco Use   • Smoking status: Former     Packs/day: 0.00     Years: 0.00     Total pack years: 0.00     Types: Cigarettes     Start date: 6/18/2019   • Smokeless tobacco: Never   Substance Use Topics   • Alcohol use: Yes     Comment: social, 3 per month     Allergies   Allergen Reactions   • Peanut Oil - Food Allergy Cough and Other (See Comments)     oral   • Peppermint Oil - Food Allergy Cough, Hives, Itching, Other (See Comments), Rash and Swelling     Sneezing. [unfilled]    Review of Systems   Constitutional: Positive for chills, fatigue and fever. Negative for activity change, appetite change and unexpected weight change. HENT: Positive for trouble swallowing. Negative for voice change. Mild trouble swallowing occasionally on solids   Eyes: Negative for photophobia and visual disturbance. Drops vision every year, near sighted   Cardiovascular: Negative for palpitations. Endocrine: Positive for cold intolerance. Negative for heat intolerance, polydipsia, polyphagia and polyuria. Genitourinary: Positive for difficulty urinating and flank pain. Pelvic pain during menstruation  Right breast pain with occasional redness, no lumps   Musculoskeletal: Positive for arthralgias. Discomfort on right side of abdomen   Skin: Negative for color change. Neurological: Positive for headaches. Negative for tremors, weakness and light-headedness. Physical Exam:  Body mass index is 31.28 kg/m².   /68 (BP Location: Left arm, Patient Position: Sitting, Cuff Size: Standard)   Pulse 80   Ht 5' 2" (1.575 m)   SpO2 99%   BMI 31.28 kg/m²    [unfilled]     Physical Exam  Constitutional:       General: She is not in acute distress. Appearance: Normal appearance. HENT:      Head: Normocephalic and atraumatic. Eyes:      Extraocular Movements: Extraocular movements intact. Comments: No proptosis   Neck:      Comments: No swelling or palpable thyroid nodules/lumps/lymph nodes  Cardiovascular:      Rate and Rhythm: Normal rate. Pulmonary:      Effort: Pulmonary effort is normal.      Breath sounds: No wheezing. Musculoskeletal:         General: No swelling. Normal range of motion. Cervical back: Normal range of motion and neck supple. No tenderness. Comments: Trace bilateral pedal edema   Skin:     General: Skin is warm and dry. Neurological:      General: No focal deficit present. Mental Status: She is alert and oriented to person, place, and time. Psychiatric:         Mood and Affect: Mood normal.         Thought Content:  Thought content normal.           Labs:   No components found for: "HA1C"  No components found for: "GLU"   05/18/2023      Total cholesterol 235  Triglycerides 115  HDL 57  VLDL 20  Vitamin D 43  Vitamin B12 normal  Ferritin 16  HbA1c 5.3  Hb 12.2  Calcium  Creatinine 0.8  TSH 9.880 (ref range 0.45-4.5) on 07/10/2023, previously 7.57 in May 2022  FT4 0.86 (ref range 0.82-1.77) previously 1.03 in May 2022  T3 123 (ref range )  ESR 21    Component      T4, Free   Thyroid Stimulating Hormone   TSH, 3rd Generation     Component 08/25/2019 04/25/2018 03/29/2017 03/29/2017 03/29/2017           T4, Free -- -- 1.41 -- --   Thyroid Stimulating Hormone 0.33 Low     <0.01 Low     -- TSH reflexed to TSH, 3rd generation --   TSH, 3rd Generation -- -- --       [address]  04/25/2018  Component      Cholesterol   Triglyceride   Cholesterol, HDL, Direct   Cholesterol, Non-HDL   Cholesterol, LDL, Calculated   CHOL/HDL Ratio     Component 04/25/2018       Cholesterol 225 High       Triglyceride 133   Cholesterol, HDL, Direct 47   Cholesterol, Non- High        Cholesterol, LDL, Calculated 151 High        CHOL/HDL Ratio 4.79     Metanephrines, Fractionated   Normetanephrine, Free   Metanephrine Interpretation   Component 03/29/2017       Metanephrines, Fractionated <0.10   Normetanephrine, Free 0.28           Lab Results   Component Value Date    CREATININE 0.90 11/08/2019    CREATININE 0.80 04/16/2016    CREATININE 0.88 02/17/2016    BUN 9 11/08/2019     05/07/2015    K 3.4 (L) 11/08/2019     11/08/2019    CO2 25 11/08/2019     eGFR   Date Value Ref Range Status   11/08/2019 83 ml/min/1.73sq m Final     No components found for: "MALBCRER"    Lab Results   Component Value Date    HDL 64 (H) 02/17/2016    TRIG 137 02/17/2016       Lab Results   Component Value Date    ALT 15 11/08/2019    AST 14 11/08/2019    ALKPHOS 68 11/08/2019    BILITOT 0.2 05/07/2015       Lab Results   Component Value Date    FREET4 0.9 07/22/2014       Impression:  1. Acquired hypothyroidism    2. Moderate mixed hyperlipidemia not requiring statin therapy    3. Other fatigue    4. Breast discharge         Plan:     Problem List Items Addressed This Visit        Endocrine    Acquired hypothyroidism - Primary    Relevant Orders    TSH + Free T4    Thyroid Antibodies Panel Lab Collect    Celiac Disease Panel    TSH + Free T4   Other Visit Diagnoses     Moderate mixed hyperlipidemia not requiring statin therapy        Other fatigue        Relevant Orders    Celiac Disease Panel    Vitamin B12/Folate, Serum Panel    Iron Panel (Includes Ferritin, Iron Sat%, Iron, and TIBC)    Breast discharge        Relevant Orders    Prolactin Lab Collect          Diagnoses and all orders for this visit:    Acquired hypothyroidism  -     TSH + Free T4; Future  -     Thyroid Antibodies Panel Lab Collect;  Future  -     Celiac Disease Panel; Future  -     TSH + Free T4  -     Thyroid Antibodies Panel Lab Collect  -     Celiac Disease Panel  -     TSH + Free T4; Future  -     TSH + Free T4    Moderate mixed hyperlipidemia not requiring statin therapy    Other fatigue  -     Celiac Disease Panel; Future  -     Vitamin B12/Folate, Serum Panel; Future  -     Iron Panel (Includes Ferritin, Iron Sat%, Iron, and TIBC); Future  -     Celiac Disease Panel  -     Vitamin B12/Folate, Serum Panel    Breast discharge  -     Prolactin Lab Collect; Future  -     Prolactin Lab Collect      1. Acquired hypothyroidism    TSH noted to be elevated before patient was started on Synthroid in July by primary care physician. No palpable thyroid nodules on exam.  Patient is symptomatic with fatigue and cold intolerance. Continue taking Synthroid 50 mcg on empty stomach in morning at this time. Since it has been 5 to 6 weeks since Synthroid were started, will get thyroid profile now to assess response to current dosage and make adjustments accordingly  Advised to get TSH, FT4, prolactin  (given hx of nipple discharge and infertility) done now in fasting in AM.   Reach out if pregnancy occurs  With history of Hashimoto's disease, will order celiac panel, vitamin B12/folate levels. Ordered iron panel given menorrhagia and low ferritin levels. Follow up in 6 months with labs ordered including iron panel, B12/folate, TSH, FT4        Right breast pain and discharge:  Complains of right breast pain and occasional color changes and occasional discharge in the past with hx of breast cancer in cousin as well as right flank pain, low grade fever and chills with urinary difficulty; advised to get urinalysis and mammogram, follow up with Obgyn for menstrual abnormalities      Discussed with the patient and all questioned fully answered. She will call me if any problems arise.

## 2023-09-07 NOTE — PATIENT INSTRUCTIONS
Get ordered labs done now  Continue taking Synthroid 50 mcg on empty stomach in morning  Reach out if pregnancy occurs  Follow up in 6 months with other labs ordered  Advise to get urinalysis and mammogram, follow up with Obgyn for menstrual abnormalities

## 2023-09-08 ENCOUNTER — APPOINTMENT (OUTPATIENT)
Dept: LAB | Facility: CLINIC | Age: 38
End: 2023-09-08
Payer: COMMERCIAL

## 2023-09-08 DIAGNOSIS — R53.83 OTHER FATIGUE: ICD-10-CM

## 2023-09-08 DIAGNOSIS — N64.52 NIPPLE DISCHARGE: ICD-10-CM

## 2023-09-08 DIAGNOSIS — E03.9 HYPOTHYROIDISM, ADULT: ICD-10-CM

## 2023-09-08 DIAGNOSIS — R53.83 FATIGUE, UNSPECIFIED TYPE: ICD-10-CM

## 2023-09-08 LAB
FERRITIN SERPL-MCNC: 13 NG/ML (ref 11–307)
FOLATE SERPL-MCNC: 15.5 NG/ML
IRON SATN MFR SERPL: 8 % (ref 15–50)
IRON SERPL-MCNC: 30 UG/DL (ref 50–212)
PROLACTIN SERPL-MCNC: 12.6 NG/ML (ref 3.34–26.72)
T4 FREE SERPL-MCNC: 0.77 NG/DL (ref 0.61–1.12)
TIBC SERPL-MCNC: 389 UG/DL (ref 250–450)
TSH SERPL DL<=0.05 MIU/L-ACNC: 3.42 UIU/ML (ref 0.45–4.5)
UIBC SERPL-MCNC: 359 UG/DL (ref 155–355)
VIT B12 SERPL-MCNC: 308 PG/ML (ref 180–914)

## 2023-09-08 PROCEDURE — 82728 ASSAY OF FERRITIN: CPT

## 2023-09-08 PROCEDURE — 86376 MICROSOMAL ANTIBODY EACH: CPT

## 2023-09-08 PROCEDURE — 86364 TISS TRNSGLTMNASE EA IG CLAS: CPT

## 2023-09-08 PROCEDURE — 82607 VITAMIN B-12: CPT

## 2023-09-08 PROCEDURE — 36415 COLL VENOUS BLD VENIPUNCTURE: CPT

## 2023-09-08 PROCEDURE — 84443 ASSAY THYROID STIM HORMONE: CPT

## 2023-09-08 PROCEDURE — 83550 IRON BINDING TEST: CPT

## 2023-09-08 PROCEDURE — 86258 DGP ANTIBODY EACH IG CLASS: CPT

## 2023-09-08 PROCEDURE — 86800 THYROGLOBULIN ANTIBODY: CPT

## 2023-09-08 PROCEDURE — 86231 EMA EACH IG CLASS: CPT

## 2023-09-08 PROCEDURE — 83540 ASSAY OF IRON: CPT

## 2023-09-08 PROCEDURE — 82746 ASSAY OF FOLIC ACID SERUM: CPT

## 2023-09-08 PROCEDURE — 84439 ASSAY OF FREE THYROXINE: CPT

## 2023-09-08 PROCEDURE — 82784 ASSAY IGA/IGD/IGG/IGM EACH: CPT

## 2023-09-08 PROCEDURE — 84146 ASSAY OF PROLACTIN: CPT

## 2023-09-09 LAB
ENDOMYSIUM IGA SER QL: NEGATIVE
GLIADIN PEPTIDE IGA SER-ACNC: 5 UNITS (ref 0–19)
GLIADIN PEPTIDE IGG SER-ACNC: 1 UNITS (ref 0–19)
IGA SERPL-MCNC: 314 MG/DL (ref 87–352)
THYROGLOB AB SERPL-ACNC: <1 IU/ML (ref 0–0.9)
THYROPEROXIDASE AB SERPL-ACNC: 204 IU/ML (ref 0–34)
TTG IGA SER-ACNC: <2 U/ML (ref 0–3)
TTG IGG SER-ACNC: <2 U/ML (ref 0–5)

## 2023-09-11 ENCOUNTER — TELEPHONE (OUTPATIENT)
Dept: ENDOCRINOLOGY | Facility: CLINIC | Age: 38
End: 2023-09-11

## 2023-09-11 DIAGNOSIS — E03.8 HYPOTHYROIDISM DUE TO HASHIMOTO'S THYROIDITIS: Primary | ICD-10-CM

## 2023-09-11 DIAGNOSIS — E06.3 HYPOTHYROIDISM DUE TO HASHIMOTO'S THYROIDITIS: Primary | ICD-10-CM

## 2023-09-11 RX ORDER — LEVOTHYROXINE SODIUM 0.07 MG/1
75 TABLET ORAL
Qty: 30 TABLET | Refills: 5 | Status: SHIPPED | OUTPATIENT
Start: 2023-09-11

## 2023-09-11 NOTE — TELEPHONE ENCOUNTER
Spoke to patient and relayed lab results. Keeping in view improved however above goal of 2.5 TSH in childbearing age, will increase the dosage of Synthroid to 75 mcg QAM from 50 mcg. With low iron panel, advised patient to keep an eye out for melena/rectal bleed with hx of rectal bleed and follow up with Obgyn for menorrhagia, start taking OTC Ferrous sulfate and also B12 500 mcg every other day to optimize B12 levels.  Coeliac panel negative

## 2023-09-11 NOTE — TELEPHONE ENCOUNTER
Patient called and would like to go over lab results with Dr Lida Saunders.   She was told to call here once her labs were completed   Thank you

## 2023-10-25 ENCOUNTER — OFFICE VISIT (OUTPATIENT)
Dept: DERMATOLOGY | Facility: CLINIC | Age: 38
End: 2023-10-25
Payer: COMMERCIAL

## 2023-10-25 VITALS — WEIGHT: 171 LBS | BODY MASS INDEX: 31.47 KG/M2 | TEMPERATURE: 96.8 F | HEIGHT: 62 IN

## 2023-10-25 DIAGNOSIS — L81.4 LENTIGINES: ICD-10-CM

## 2023-10-25 DIAGNOSIS — R21 RASH: Primary | ICD-10-CM

## 2023-10-25 DIAGNOSIS — D22.9 MULTIPLE BENIGN MELANOCYTIC NEVI: ICD-10-CM

## 2023-10-25 PROCEDURE — 99203 OFFICE O/P NEW LOW 30 MIN: CPT | Performed by: STUDENT IN AN ORGANIZED HEALTH CARE EDUCATION/TRAINING PROGRAM

## 2023-10-25 NOTE — PATIENT INSTRUCTIONS
MELANOCYTIC NEVI ("Moles")    Assessment and Plan:  Based on a thorough discussion of this condition and the management approach to it (including a comprehensive discussion of the known risks, side effects and potential benefits of treatment), the patient (family) agrees to implement the following specific plan:  Benign reassured  Monitor for changes  Come back as needed       Melanocytic Nevi  Melanocytic nevi ("moles") are caused by collections of the color producing skin cells, or melanocytes, in 1 area in the skin. They can range in color from pink to dark brown and be either raised or flat. Some moles are present at birth (I.e., "congenital nevi"), while others come up later in life (i.e., "acquired nevi"). Deryl Parkinson exposure also stimulates the body to make more moles, ie the more sun you get the more moles you'll grow. Clinically distinguishing a healthy mole from melanoma may be difficult. The "ABCDE's" of moles have been suggested as a means of helping to alert a person to a suspicious mole and the possible increased risk of melanoma. Asymmetry: Healthy moles tend to be symmetric, while melanomas are often asymmetric. Asymmetry means if you draw a line through the mole, the two halves do not match in color, size, shape, or surface texture Any mole that starts to demonstrate "asymmetry" should be examined promptly by a board certified dermatologist.     Border: Healthy moles tend to have discrete, even borders. The border of a melanoma often blends into the normal skin and does not sharply delineate the mole from normal skin. Any mole that starts to demonstrate "uneven borders" should be examined promptly     Color: Healthy moles tend to be one color throughout. Melanomas tend to be made up of different colors ranging from dark black, blue, white, or red.   Any mole that demonstrates a color change should be examined promptly    Diameter: Healthy moles tend to be smaller than 0.6 cm in size; an exception are "congenital nevi" that can be larger. Melanomas tend to grow and can often be greater than 0.6 cm (1/4 of an inch, or the size of a pencil eraser). This is only a guideline, and many normal moles may be larger than 0.6 cm without being unhealthy. Any mole that starts to change in size (small to bigger or bigger to smaller) should be examined promptly    Evolving: Healthy moles tend to "stay the same."  Melanomas may often show signs of change or evolution such as a change in size, shape, color, or elevation. Any mole that starts to itch, bleed, crust, burn, hurt, or ulcerate or demonstrate a change or evolution should be examined promptly by a board certified dermatologist.      What are atypical moles or dysplastic nevi? Dysplastic moles are moles that have some of the ABCDE  changes listed above but  are not cancerous  Sometimes a biopsy and microscopic examination are needed to determine the difference. They may indicate an increased risk of melanoma in that person, especially if there is a family history of melanoma. What is a Melanoma? The main concern when looking at a new or changing mole it to evaluate whether it may be a melanoma. The appearance of a "new mole" remains one of the most reliable methods for identifying a malignant melanoma. A melanoma is a type of skin cancer that can be deadly if it spreads throughout the body. The prognosis of a Melanoma depends on how deep it has penetrated in the skin. If caught early, they generally will not have had time to grow into the deeper layers of the skin and they cure rate is then very high. Once the melanoma grows deeper into the skin, the cure rate drops dramatically. Therefore, early detection and removal of a malignant melanoma results in a much better chance of complete cure. LENTIGINES  OTHER SKIN CHANGES DUE TO CHRONIC EXPOSURE TO NONIONIZING RADIATION  - Relevant exam: Over sun exposed areas are brown macules.  ELM performed and without concerning findings. - Exam and clinical history consistent with lentigines. - Educated that these are indicative of prior sun exposure. - Counseled to return to clinic prior to scheduled appointment should any of these lesions change or should any new lesions of concern arise.  - Recommended use of sunscreen as above and below. - Counseled on use of sun protection daily. Reviewed latest FDA sunscreen guidelines, including use of broad spectrum (UVA and UVB blocking) sunscreen or sun protective clothing with SPF 30-50 every 2-3 hours and reapplied after exposure to water; use of photoprotective clothing, including a broad brim hat and UPF rated clothing if outdoors for several hours; avoid use of tanning beds as these pose significant risk for melanoma and skin cancer.

## 2023-10-25 NOTE — PROGRESS NOTES
[facility] Note     Patient Name: Risa Purcell  Encounter Date: 10/25/2023     Have you been cared for by a [facility] D[facility]atologist in the last 3 years and, if so, which description applies to you? NO. I am considered a "new" patient and must complete all patient intake questions. I am FEMALE/of child-bearing potential.    REVIEW OF SYSTEMS:  Have you recently had or currently have any of the following? Recent fever or chills? No  Any non-healing wound? No  Are you pregnant or planning to become pregnant? YES, Planning   Are you currently or planning to be nursing or breast feeding? YES, Planning    PAST MEDICAL HISTORY:  Have you personally ever had or currently have any of the following? If "YES," then please provide more detail. Skin cancer (such as Melanoma, Basal Cell Carcinoma, Squamous Cell Carcinoma? No  Tuberculosis, HIV/AIDS, Hepatitis B or C: No  Radiation Treatment No   HISTORY OF IMMUNOSUPPRESSION:   Do you have a history of any of the following:  Systemic Immunosuppression such as Diabetes, Biologic or Immunotherapy, Chemotherapy, Organ Transplantation, Bone Marrow Transplantation? No    Answering "YES" requires the addition of the dotphrase "IMMUNOSUPPRESSED" as the first diagnosis of the patient's visit. FAMILY HISTORY:  Any "first degree relatives" (parent, brother, sister, or child) with the following? Skin Cancer, Pancreatic or Other Cancer? NO, Grandfather had lymphoma    PATIENT EXPERIENCE:    Do you want the Dermatologist to perform a COMPLETE skin exam today including a clinical examination under the "bra and underwear" areas? Yes (did not examine underwear or bra areas)  If necessary, do we have your permission to call and leave a detailed message on your Preferred Phone number that includes your specific medical information?   Yes      Allergies   Allergen Reactions    Peanut Oil - Food Allergy Cough and Other (See Comments)     oral    Peppermint Oil - Food Allergy Cough, Hives, Itching, Other (See Comments), Rash and Swelling     Sneezing. Current Outpatient Medications:     cyanocobalamin (VITAMIN B-12) 1000 MCG tablet, Take 2,000 mcg by mouth daily, Disp: , Rfl:     Ferrous Sulfate (IRON PO), Take by mouth, Disp: , Rfl:     levothyroxine (Synthroid) 75 mcg tablet, Take 1 tablet (75 mcg total) by mouth daily in the early morning, Disp: 30 tablet, Rfl: 5    Cholecalciferol 50 MCG (2000 UT) CAPS, Take 1 capsule by mouth, Disp: , Rfl:     Diclofenac Sodium (VOLTAREN) 1 %, Apply 2 g topically 4 (four) times a day for 5 days, Disp: 40 g, Rfl: 0    dicyclomine (BENTYL) 20 mg tablet, Take 1 tablet (20 mg total) by mouth every 6 (six) hours For crampy abdominal pain (Patient not taking: Reported on 12/18/2019), Disp: 20 tablet, Rfl: 0    drospirenone-ethinyl estradiol (PIPER) 3-0.02 MG per tablet, Take 1 tablet by mouth daily. , Disp: , Rfl:     Hydrocortisone (Procto-Gabe) 1 % CREA, Insert into the rectum daily at bedtime as needed (hemorrhoidal bleeding), Disp: 28.4 g, Rfl: 0    LACTOBACILLUS PO, Take 1 tablet by mouth 2 (two) times a day., Disp: , Rfl:     pantoprazole (PROTONIX) 20 mg tablet, Take 1 tablet (20 mg total) by mouth daily, Disp: 30 tablet, Rfl: 3          Whom besides the patient is providing clinical information about today's encounter? NO ADDITIONAL HISTORIAN (patient alone provided history)    Physical Exam and Assessment/Plan by Diagnosis:      MELANOCYTIC NEVI ("Moles")    Physical Exam:  Anatomic Location Affected:   Mostly on sun-exposed areas of the trunk and extremities   Morphological Description:  Scattered, 1-4mm round to ovoid, symmetrical-appearing, even bordered, skin colored to dark brown macules/papules, mostly in sun-exposed areas  Pertinent Positives:  Pertinent Negatives:     Additional History of Present Condition:     Assessment and Plan:  Based on a thorough discussion of this condition and the management approach to it (including a comprehensive discussion of the known risks, side effects and potential benefits of treatment), the patient (family) agrees to implement the following specific plan:  Benign reassured  Monitor for changes  Come back as needed       Melanocytic Nevi  Melanocytic nevi ("moles") are caused by collections of the color producing skin cells, or melanocytes, in 1 area in the skin. They can range in color from pink to dark brown and be either raised or flat. Some moles are present at birth (I.e., "congenital nevi"), while others come up later in life (i.e., "acquired nevi"). [facility] exposure also stimulates the body to make more moles, ie the more sun you get the more moles you'll grow. Clinically distinguishing a healthy mole from melanoma may be difficult. The "ABCDE's" of moles have been suggested as a means of helping to alert a person to a suspicious mole and the possible increased risk of melanoma. Asymmetry: Healthy moles tend to be symmetric, while melanomas are often asymmetric. Asymmetry means if you draw a line through the mole, the two halves do not match in color, size, shape, or surface texture Any mole that starts to demonstrate "asymmetry" should be examined promptly by a board certified dermatologist.     Border: Healthy moles tend to have discrete, even borders. The border of a melanoma often blends into the normal skin and does not sharply delineate the mole from normal skin. Any mole that starts to demonstrate "uneven borders" should be examined promptly     Color: Healthy moles tend to be one color throughout. Melanomas tend to be made up of different colors ranging from dark black, blue, white, or red. Any mole that demonstrates a color change should be examined promptly    Diameter: Healthy moles tend to be smaller than 0.6 cm in size; an exception are "congenital nevi" that can be larger.   Melanomas tend to grow and can often be greater than 0.6 cm (1/4 of an inch, or the size of a penjordyn eraser). This is only a guideline, and many normal moles may be larger than 0.6 cm without being unhealthy. Any mole that starts to change in size (small to bigger or bigger to smaller) should be examined promptly    Evolving: Healthy moles tend to "stay the same."  Melanomas may often show signs of change or evolution such as a change in size, shape, color, or elevation. Any mole that starts to itch, bleed, crust, burn, hurt, or ulcerate or demonstrate a change or evolution should be examined promptly by a board certified dermatologist.      What are atypical moles or dysplastic nevi? Dysplastic moles are moles that have some of the ABCDE  changes listed above but  are not cancerous  Sometimes a biopsy and microscopic examination are needed to determine the difference. They may indicate an increased risk of melanoma in that person, especially if there is a family history of melanoma. What is a Melanoma? The main concern when looking at a new or changing mole it to evaluate whether it may be a melanoma. The appearance of a "new mole" remains one of the most reliable methods for identifying a malignant melanoma. A melanoma is a type of skin cancer that can be deadly if it spreads throughout the body. The prognosis of a Melanoma depends on how deep it has penetrated in the skin. If caught early, they generally will not have had time to grow into the deeper layers of the skin and they cure rate is then very high. Once the melanoma grows deeper into the skin, the cure rate drops dramatically. Therefore, early detection and removal of a malignant melanoma results in a much better chance of complete cure. LENTIGINES  OTHER SKIN CHANGES DUE TO CHRONIC EXPOSURE TO NONIONIZING RADIATION  - Relevant exam: Over sun exposed areas are brown macules. ELM performed and without concerning findings. - Exam and clinical history consistent with lentigines.   - Educated that these are indicative of prior sun exposure. - Counseled to return to clinic prior to scheduled appointment should any of these lesions change or should any new lesions of concern arise.  - Recommended use of sunscreen as above and below. - Counseled on use of sun protection daily. Reviewed latest FDA sunscreen guidelines, including use of broad spectrum (UVA and UVB blocking) sunscreen or sun protective clothing with SPF 30-50 every 2-3 hours and reapplied after exposure to water; use of photoprotective clothing, including a broad brim hat and UPF rated clothing if outdoors for several hours; avoid use of tanning beds as these pose significant risk for melanoma and skin cancer. RASH: rosacea > SLE facial rash. KYLIE negative in 2019. Sjogren's and Hashimoto's thyroiditis history    Physical Exam:  (Anatomic Location); (Size and Morphological Description); (Differential Diagnosis):  Clear today but pics on phone show facial malar and chin rash  Pertinent Positives:  Pertinent Negatives: Additional History of Present Condition:  Presents every time patient is not feeling well. Worse in the sun and worse with alcohol.      Assessment and Plan:  Based on a thorough discussion of this condition and the management approach to it (including a comprehensive discussion of the known risks, side effects and potential benefits of treatment), the patient (family) agrees to implement the following specific plan:    Use SPF sunscreen with Zinc oxide and titanium oxide  Patient does not want medication at this time due to face being clear  Pt says she prefers to call office when having a flair for medication  Will order KYLIE test             Scribe Attestation      I,:  Rani Huerta MA am acting as a scribe while in the presence of the attending physician.:       I,:  Ty Chew MD personally performed the services described in this documentation    as scribed in my presence.:

## 2023-11-14 ENCOUNTER — OFFICE VISIT (OUTPATIENT)
Dept: OBGYN CLINIC | Facility: CLINIC | Age: 38
End: 2023-11-14
Payer: COMMERCIAL

## 2023-11-14 VITALS
DIASTOLIC BLOOD PRESSURE: 84 MMHG | SYSTOLIC BLOOD PRESSURE: 148 MMHG | WEIGHT: 168 LBS | BODY MASS INDEX: 30.91 KG/M2 | HEIGHT: 62 IN

## 2023-11-14 DIAGNOSIS — N92.0 MENORRHAGIA WITH REGULAR CYCLE: ICD-10-CM

## 2023-11-14 DIAGNOSIS — Z11.51 SCREENING FOR HPV (HUMAN PAPILLOMAVIRUS): ICD-10-CM

## 2023-11-14 DIAGNOSIS — Z01.419 WELL WOMAN EXAM WITH ROUTINE GYNECOLOGICAL EXAM: Primary | ICD-10-CM

## 2023-11-14 DIAGNOSIS — Z12.4 ENCOUNTER FOR SCREENING FOR MALIGNANT NEOPLASM OF CERVIX: ICD-10-CM

## 2023-11-14 DIAGNOSIS — Z12.31 ENCOUNTER FOR SCREENING MAMMOGRAM FOR MALIGNANT NEOPLASM OF BREAST: ICD-10-CM

## 2023-11-14 DIAGNOSIS — N94.6 DYSMENORRHEA: ICD-10-CM

## 2023-11-14 PROCEDURE — 99385 PREV VISIT NEW AGE 18-39: CPT | Performed by: OBSTETRICS & GYNECOLOGY

## 2023-11-14 PROCEDURE — G0476 HPV COMBO ASSAY CA SCREEN: HCPCS | Performed by: OBSTETRICS & GYNECOLOGY

## 2023-11-14 PROCEDURE — G0145 SCR C/V CYTO,THINLAYER,RESCR: HCPCS | Performed by: OBSTETRICS & GYNECOLOGY

## 2023-11-14 NOTE — PATIENT INSTRUCTIONS
Fertility Testing    The day your period starts is cycle day 1 (CD-1). It will be important to keep track of your cycle during this time. Call [phone] to have your CD-3 ultrasound scheduled at [facility]. If this cannot be done on CD-3, please have it done on CD-2 or CD-4. Preferably on cycle day 3, you should have blood work collected. If this can not be done on CD-3, please have it done on CD-2 or CD-4.   labs: LH, FSH, estradiol & thyroid    On CD-12, start testing with an ovulation predictor kit. Please call the office when your kit turns positive. A positive kit indicates ovulation. Have intercourse every other day starting around CD-12. [place] the night of the [address] surge (positive kit) and the following evening is recommended. Have your blood work collected on CD-21, or 7 days after your kit turns positive.   labs: progesterone  Please call the office at [phone] to set up an appointment to discuss results and treatment options.

## 2023-11-14 NOTE — PROGRESS NOTES
ASSESSMENT & PLAN:   Diagnoses and all orders for this visit:    Well woman exam with routine gynecological exam  -     Liquid-based pap, screening    Encounter for screening for malignant neoplasm of cervix  -     Liquid-based pap, screening    Screening for HPV (human papillomavirus)  -     Liquid-based pap, screening    Encounter for screening mammogram for malignant neoplasm of breast  -     Mammo screening bilateral w 3d & cad; Future    Menorrhagia with regular cycle  -     Antimullerian hormone (AMH); Future  -     Estradiol; Future  -     Follicle stimulating hormone; Future  -     Luteinizing hormone; Future  -     US pelvis complete w transvaginal; Future  -     TSH, 3rd generation with Free T4 reflex; Future    Dysmenorrhea  -     Antimullerian hormone (AMH); Future  -     Estradiol; Future  -     Follicle stimulating hormone; Future  -     Luteinizing hormone; Future  -     US pelvis complete w transvaginal; Future  -     TSH, 3rd generation with Free T4 reflex; Future          The following were reviewed in today's visit: ASCCP guidelines, Gardisil vaccination, STD testing breast self exam, mammography screening ordered, family planning choices, and exercise. Patient to return to office in yearly for annual exam.     All questions have been answered to her satisfaction. CC:  Annual Gynecologic Examination  Chief Complaint   Patient presents with    Gynecologic Exam     Pt is here for an annual exam and pap smear. She is complaining of irregular periods. Last pap 2018: Neg pap/ Neg HPV [facility])   Last mammo 06/06/2023- Neg-repeat 1 yr (hx bilateral breast reduction and fam hx breast cancer)        HPI: Guzman Aldana is a 43 y.o. Akanksha Dru who presents for annual gynecologic examination. She has the following concerns:  h/o fibroids. Periods are very heavy and very clotty. She is having really bad pelvic pain, especially during her period. She only feels normal for about 5 days a month.  She gets significant bloating as well as back pain, and it's all on the right side, during her period. She has worked to lose weight but the bloating continues. She has several small fibroids, US was done in February 2023. She is also trying to get pregnant. Her periods are every 26-28 days, sometimes 30. She does get some symptoms around ovulation, pregnancy like symptoms. She would love to get pregnant spontaneously but not sure she wants to go the NATALYA route. Her partner has blood work at home but he hasn't gone, he has not done semen analysis. Discussed day 3 labs and semen analysis. Together about 10 years, TTC for 3-4 years. She has done OPK, she would get peaks. 8/2023 hgb/hct 12.0/35.2    Health Maintenance:    Exercise: frequently  Breast exams/breast awareness: yes    Past Medical History:   Diagnosis Date    B12 deficiency     Disease of thyroid gland     GERD (gastroesophageal reflux disease)     Hashimoto's disease     Hypertension     Sjogren's disease ([address])        Past Surgical History:   Procedure Laterality Date    EGD AND COLONOSCOPY N/A 3/25/2016    Procedure: EGD AND COLONOSCOPY;  Surgeon: Hank Albarran MD;  Location: [facility]; Service:        Past OB/Gyn History:   Patient's last menstrual period was 11/04/2023. Last Pap: 2018 : no abnormalities  History of abnormal Pap smear: yes - in early 25s. HPV vaccine completed: unknown    Patient is currently sexually active.    STD testing: no  Current contraception: none      Family History  Family History   Problem Relation Age of Onset    No Known Problems Mother     Heart attack Father     Heart disease Father     Hashimoto's thyroiditis Brother     No Known Problems Brother     Dementia Maternal Grandmother     Lymphoma Maternal Grandfather     Alzheimer's disease Paternal Grandmother     Deep vein thrombosis Paternal Grandfather        Family history of uterine or ovarian cancer: no  Family history of breast cancer: no  Family history of colon cancer: no    Social History:  Social History     Socioeconomic History    Marital status: /Civil Union     Spouse name: Not on file    Number of children: Not on file    Years of education: Not on file    Highest education level: Not on file   Occupational History    Not on file   Tobacco Use    Smoking status: Former     Packs/day: 0.00     Years: 0.00     Total pack years: 0.00     Types: Cigarettes     Start date: 6/18/2019    Smokeless tobacco: Never   Vaping Use    Vaping Use: Never used   Substance and Sexual Activity    Alcohol use: Yes     Comment: social, 3 per month    Drug use: No    Sexual activity: Yes     Partners: Male     Birth control/protection: None   Other Topics Concern    Not on file   Social History Narrative    Not on file     Social Determinants of Health     Financial Resource Strain: Not on file   Food Insecurity: Not on file   Transportation Needs: Not on file   Physical Activity: Not on file   Stress: Not on file   Social Connections: Not on file   Intimate Partner Violence: Not on file   Housing Stability: Not on file     Domestic violence screen: negative    Allergies: Allergies   Allergen Reactions    Peanut Oil - Food Allergy Cough and Other (See Comments)     oral    Peppermint Oil - Food Allergy Cough, Hives, Itching, Other (See Comments), Rash and Swelling     Sneezing. Medications:    Current Outpatient Medications:     cyanocobalamin (VITAMIN B-12) 1000 MCG tablet, Take 2,000 mcg by mouth daily, Disp: , Rfl:     Ferrous Sulfate (IRON PO), Take by mouth, Disp: , Rfl:     levothyroxine (Synthroid) 75 mcg tablet, Take 1 tablet (75 mcg total) by mouth daily in the early morning, Disp: 30 tablet, Rfl: 5    Review of Systems:  Review of Systems   Constitutional:  Negative for chills and fever. Respiratory:  Negative for shortness of breath. Cardiovascular:  Negative for chest pain.    Gastrointestinal:  Positive for abdominal distention, blood in stool (internal hemorrhoids), constipation and nausea. Negative for abdominal pain and vomiting. Genitourinary:  Positive for frequency, menstrual problem and pelvic pain. Negative for difficulty urinating, dyspareunia, dysuria, urgency, vaginal bleeding, vaginal discharge and vaginal pain. "Pee constipation"     Psychiatric/Behavioral:  The patient is nervous/anxious. Some times spots after intercourse. Physical Exam:  /84   Ht 5' 2" (1.575 m)   Wt 76.2 kg (168 lb)   LMP 11/04/2023   BMI 30.73 kg/m²    Physical Exam  Constitutional:       General: She is awake. Appearance: Normal appearance. She is well-developed. Genitourinary:      Vulva, bladder and urethral meatus normal.      Right Labia: No rash, tenderness or lesions. Left Labia: No tenderness, lesions or rash. No labial fusion noted. No vaginal discharge, erythema, tenderness or bleeding. No vaginal prolapse present. No vaginal atrophy present. Right Adnexa: not tender, not full and no mass present. Left Adnexa: not tender, not full and no mass present. Cervix is nulliparous. No cervical motion tenderness, discharge, lesion or polyp. Uterus is not enlarged, tender or irregular. No uterine mass detected. No urethral prolapse present. Bladder is not tender. Pelvic exam was performed with patient in the lithotomy position. Breasts:     Right: No inverted nipple, mass, nipple discharge, skin change or tenderness. Left: No inverted nipple, mass, nipple discharge, skin change or tenderness. HENT:      Head: Normocephalic and atraumatic. Cardiovascular:      Rate and Rhythm: Normal rate and regular rhythm. Heart sounds: Normal heart sounds. Pulmonary:      Effort: Pulmonary effort is normal. No tachypnea or respiratory distress. Breath sounds: Normal breath sounds. Abdominal:      General: Abdomen is flat. There is no distension.       Palpations: Abdomen is soft. Tenderness: There is no abdominal tenderness. There is no guarding or rebound. Musculoskeletal:      Cervical back: Neck supple. Lymphadenopathy:      Upper Body:      Right upper body: No supraclavicular or axillary adenopathy. Left upper body: No supraclavicular or axillary adenopathy. Neurological:      General: No focal deficit present. Mental Status: She is alert and oriented to person, place, and time. Psychiatric:         Mood and Affect: Mood normal.         Behavior: Behavior normal.         Thought Content: Thought content normal.         Judgment: Judgment normal.   Vitals reviewed.

## 2023-11-16 LAB
HPV HR 12 DNA CVX QL NAA+PROBE: NEGATIVE
HPV16 DNA CVX QL NAA+PROBE: NEGATIVE
HPV18 DNA CVX QL NAA+PROBE: NEGATIVE

## 2023-11-17 NOTE — RESULT ENCOUNTER NOTE
Ronal Jennings,     Your HPV testing is negative. Your pap is still pending, and will be updated soon. Please contact the office at [phone] with any questions.      Erika

## 2023-11-24 ENCOUNTER — HOSPITAL ENCOUNTER (OUTPATIENT)
Dept: ULTRASOUND IMAGING | Facility: HOSPITAL | Age: 38
Discharge: HOME | End: 2023-11-24
Attending: OBSTETRICS & GYNECOLOGY
Payer: COMMERCIAL

## 2023-11-24 DIAGNOSIS — N94.6 DYSMENORRHEA: ICD-10-CM

## 2023-11-24 DIAGNOSIS — N92.0 MENORRHAGIA WITH REGULAR CYCLE: ICD-10-CM

## 2023-11-24 LAB
LAB AP GYN PRIMARY INTERPRETATION: NORMAL
Lab: NORMAL

## 2023-11-24 PROCEDURE — 76856 US EXAM PELVIC COMPLETE: CPT

## 2023-11-24 PROCEDURE — 76830 TRANSVAGINAL US NON-OB: CPT

## 2023-12-03 ENCOUNTER — LAB (OUTPATIENT)
Dept: LAB | Facility: CLINIC | Age: 38
End: 2023-12-03
Payer: COMMERCIAL

## 2023-12-03 DIAGNOSIS — N94.6 DYSMENORRHEA: ICD-10-CM

## 2023-12-03 DIAGNOSIS — R21 RASH: ICD-10-CM

## 2023-12-03 DIAGNOSIS — N92.0 MENORRHAGIA WITH REGULAR CYCLE: ICD-10-CM

## 2023-12-03 LAB
ESTRADIOL SERPL-MCNC: 67.1 PG/ML
FSH SERPL-ACNC: 19.4 MIU/ML
LH SERPL-ACNC: 10.7 MIU/ML
TSH SERPL DL<=0.05 MIU/L-ACNC: 0.8 UIU/ML (ref 0.45–4.5)

## 2023-12-03 PROCEDURE — 82397 CHEMILUMINESCENT ASSAY: CPT

## 2023-12-03 PROCEDURE — 83001 ASSAY OF GONADOTROPIN (FSH): CPT

## 2023-12-03 PROCEDURE — 86038 ANTINUCLEAR ANTIBODIES: CPT

## 2023-12-03 PROCEDURE — 84443 ASSAY THYROID STIM HORMONE: CPT

## 2023-12-03 PROCEDURE — 82670 ASSAY OF TOTAL ESTRADIOL: CPT

## 2023-12-03 PROCEDURE — 36415 COLL VENOUS BLD VENIPUNCTURE: CPT

## 2023-12-03 PROCEDURE — 83002 ASSAY OF GONADOTROPIN (LH): CPT

## 2023-12-04 LAB — ANA SER QL IA: NEGATIVE

## 2023-12-06 ENCOUNTER — TELEPHONE (OUTPATIENT)
Dept: DERMATOLOGY | Facility: CLINIC | Age: 38
End: 2023-12-06

## 2023-12-06 DIAGNOSIS — L71.9 ROSACEA: Primary | ICD-10-CM

## 2023-12-06 LAB — MIS SERPL-MCNC: 0.64 NG/ML

## 2023-12-06 NOTE — TELEPHONE ENCOUNTER
----- Message from Keron Pang sent at 12/5/2023  8:44 AM EST -----  Patient notified.  Patient requesting refill of metronidazole 0.75% cream

## 2024-01-10 ENCOUNTER — OFFICE VISIT (OUTPATIENT)
Dept: GASTROENTEROLOGY | Facility: CLINIC | Age: 39
End: 2024-01-10
Payer: COMMERCIAL

## 2024-01-10 ENCOUNTER — TELEPHONE (OUTPATIENT)
Dept: GASTROENTEROLOGY | Facility: CLINIC | Age: 39
End: 2024-01-10

## 2024-01-10 VITALS
HEIGHT: 62 IN | SYSTOLIC BLOOD PRESSURE: 130 MMHG | BODY MASS INDEX: 31.83 KG/M2 | TEMPERATURE: 98.1 F | DIASTOLIC BLOOD PRESSURE: 84 MMHG | WEIGHT: 173 LBS

## 2024-01-10 DIAGNOSIS — K62.5 RECTAL BLEEDING: ICD-10-CM

## 2024-01-10 DIAGNOSIS — D50.9 IRON DEFICIENCY ANEMIA, UNSPECIFIED IRON DEFICIENCY ANEMIA TYPE: Primary | ICD-10-CM

## 2024-01-10 DIAGNOSIS — R11.0 NAUSEA: ICD-10-CM

## 2024-01-10 DIAGNOSIS — R10.2 PELVIC PAIN: ICD-10-CM

## 2024-01-10 PROCEDURE — 99204 OFFICE O/P NEW MOD 45 MIN: CPT | Performed by: INTERNAL MEDICINE

## 2024-01-10 NOTE — PROGRESS NOTES
[facility] - Outpatient Consultation  Michaela Ware 39 y.o. female MRN: [id number]  Encounter: [id number]          ASSESSMENT AND PLAN:      1. Iron deficiency anemia, unspecified iron deficiency anemia type  - Colonoscopy; Future  - EGD; Future  2. Pelvic pain  - Colonoscopy; Future  3. Rectal bleeding  - Colonoscopy; Future  4. Nausea  - EGD; Future    39-year-old female presenting for evaluation of general GI complaints including nausea, intermittent rectal bleeding, increased mucus in stool, pelvic pain, iron deficiency anemia.    Otherwise expect iron deficiency to be primarily driven by menorrhagia, and given intermittent blood in the stool, GI evaluation with bidirectional endoscopy is indicated.  Will plan for EGD and colonoscopy to evaluate for anemia as well as her other complaints of intermittent nausea, pelvic pain, rectal bleeding.  The description of her symptoms overall seems benign, most suspicious for possible irritable bowel syndrome with anorectal outlet irritation however endoscopic evaluation is warranted.    If procedures unremarkable, will plan to address bowel regimen if needed as well as consideration of antispasmodics.  Lower abdominal imaging may also be indicated.  She will follow-up in the office after her procedures  ______________________________________________________________________    HPI:  Michaela Ware is a pleasant 39-year-old female presenting for evaluation of intermittent rectal bleeding, increased mucus in the stool, iron deficiency anemia, pelvic pain.    Her symptoms have been ongoing for the past year but has been intermittent in the years prior.  Most recently the mucus has been really consistent and sometimes mixed with small amounts of blood.  She occasionally has loose stools but more prominently she has small but formed stools.  She has a bowel movement typically on a daily basis.    She is also having lower abdominal pain and pelvic pain,  predominantly in the right lower side.  This comes and goes and sometimes she feels that this is lopsided in appearance and getting out of the shower has a red appearance on that side.    She has menorrhagia.  She is following with OB/GYN.  She is on iron supplementation.  Review of most recent iron studies demonstrate iron deficiency with elevated TIBC, low iron stores.    Denies any prior abdominal surgeries.  EGD in 2019 was overall unremarkable  Colonoscopy over 10 years ago, told she had hemorrhoids and fissure  Father with colon polyps, details of these are unknown      REVIEW OF SYSTEMS:    CONSTITUTIONAL: Denies any fever, chills, rigors, and weight loss.  HEENT: Denies odynophagia, tinnitus  CARDIOVASCULAR: No chest pain or palpitations.   RESPIRATORY: Denies any cough, hemoptysis, shortness of breath or dyspnea on exertion.  GASTROINTESTINAL: As noted in the History of Present Illness.   GENITOURINARY: No problems with urination. Denies any hematuria or dysuria.  NEUROLOGIC: No dizziness or vertigo, denies headaches.   MUSCULOSKELETAL: Denies any muscle or joint pain.   SKIN: Denies skin rashes or itching.   ENDOCRINE:  Denies intolerance to heat or cold.  PSYCHOSOCIAL: Denies depression or anxiety. Denies any recent memory loss.       Historical Information   Past Medical History:   Diagnosis Date   • B12 deficiency    • Disease of thyroid gland    • GERD (gastroesophageal reflux disease)    • Hashimoto's disease    • Hypertension    • Sjogren's disease (HCC)      Past Surgical History:   Procedure Laterality Date   • COLONOSCOPY     • EGD AND COLONOSCOPY N/A 03/25/2016    Procedure: EGD AND COLONOSCOPY;  Surgeon: Aly Abreu MD;  Location: [facility];  Service:      Social History   Social History     Substance and Sexual Activity   Alcohol Use Yes    Comment: social, 3 per month     Social History     Substance and Sexual Activity   Drug Use No     Social History     Tobacco Use   Smoking Status Former  "  • Types: Cigarettes   • Start date: 6/18/2019   Smokeless Tobacco Never     Family History   Problem Relation Age of Onset   • No Known Problems Mother    • Heart attack Father    • Heart disease Father    • Hashimoto's thyroiditis Brother    • No Known Problems Brother    • Dementia Maternal Grandmother    • Lymphoma Maternal Grandfather    • Alzheimer's disease Paternal Grandmother    • Deep vein thrombosis Paternal Grandfather        Meds/Allergies       Current Outpatient Medications:   •  cyanocobalamin (VITAMIN B-12) 1000 MCG tablet  •  Ferrous Sulfate (IRON PO)  •  levothyroxine (Synthroid) 75 mcg tablet  •  sertraline (ZOLOFT) 50 mg tablet  •  metroNIDAZOLE (METROCREAM) 0.75 % cream    Allergies   Allergen Reactions   • Peanut Oil - Food Allergy Cough and Other (See Comments)     oral   • Peppermint Oil - Food Allergy Cough, Hives, Itching, Other (See Comments), Rash and Swelling     Sneezing.             Objective     Blood pressure 130/84, temperature 98.1 °F (36.7 °C), temperature source Tympanic, height 5' 2\" (1.575 m), weight 78.5 kg (173 lb), not currently breastfeeding. Body mass index is 31.64 kg/m².        PHYSICAL EXAM:      General Appearance:   Alert, cooperative, no distress   HEENT:   Normocephalic, atraumatic, anicteric.     Neck:  Supple, symmetrical, trachea midline   Lungs:   Clear to auscultation bilaterally; no rales, rhonchi or wheezing; respirations unlabored    Heart::   Regular rate and rhythm; no murmur, rub, or gallop.   Abdomen:   Soft, non-tender, non-distended; normal bowel sounds; no masses, no organomegaly    Genitalia:   Deferred    Rectal:   Deferred    Extremities:  No cyanosis, clubbing or edema    Pulses:  2+ and symmetric    Skin:  No jaundice, rashes, or lesions          Lab Results:   No visits with results within 1 Day(s) from this visit.   Latest known visit with results is:   Lab on 12/03/2023   Component Date Value   • KYLIE 12/03/2023 Negative    • ANTI-MULLERIAN " HORMONE (* 12/03/2023 0.636    • Estradiol 12/03/2023 67.1    • FSH 12/03/2023 19.4    • LH 12/03/2023 10.7    • TSH 3RD GENERATON 12/03/2023 0.804          Radiology Results:   No results found.  Answers submitted by the patient for this visit:  Abdominal Pain Questionnaire (Submitted on 1/10/2024)  Chief Complaint: Abdominal pain  Chronicity: chronic  Onset: more than 1 month ago  Onset quality: undetermined  Frequency: daily  Episode duration: 3 Weeks  Progression since onset: waxing and waning  Pain location: RLQ, right flank  Pain quality: dull, a sensation of fullness  Radiates to: suprapubic region, back, pelvis, perineum  arthralgias: Yes  constipation: Yes  frequency: Yes  headaches: Yes  hematochezia: Yes  melena: Yes  nausea: Yes

## 2024-01-10 NOTE — PATIENT INSTRUCTIONS
Scheduled date of EGD/colonoscopy (as of today):02.26.24  Physician performing EGD/colonoscopy:DR SHELL  Location of EGD/colonoscopy:[facility]  Desired bowel prep reviewed with patient:DULCOLAX/MIRALAX  Instructions reviewed with patient by:YARIEL  Clearances:  N/A

## 2024-02-12 ENCOUNTER — ANESTHESIA EVENT (OUTPATIENT)
Dept: ANESTHESIOLOGY | Facility: HOSPITAL | Age: 39
End: 2024-02-12

## 2024-02-12 ENCOUNTER — ANESTHESIA (OUTPATIENT)
Dept: ANESTHESIOLOGY | Facility: HOSPITAL | Age: 39
End: 2024-02-12

## 2024-02-14 ENCOUNTER — PATIENT MESSAGE (OUTPATIENT)
Dept: GASTROENTEROLOGY | Facility: CLINIC | Age: 39
End: 2024-02-14

## 2024-02-26 ENCOUNTER — HOSPITAL ENCOUNTER (OUTPATIENT)
Dept: GASTROENTEROLOGY | Facility: AMBULARY SURGERY CENTER | Age: 39
Setting detail: OUTPATIENT SURGERY
Discharge: HOME | End: 2024-02-26
Attending: INTERNAL MEDICINE
Payer: COMMERCIAL

## 2024-02-26 ENCOUNTER — ANESTHESIA EVENT (OUTPATIENT)
Dept: GASTROENTEROLOGY | Facility: AMBULARY SURGERY CENTER | Age: 39
End: 2024-02-26

## 2024-02-26 ENCOUNTER — ANESTHESIA (OUTPATIENT)
Dept: GASTROENTEROLOGY | Facility: AMBULARY SURGERY CENTER | Age: 39
End: 2024-02-26

## 2024-02-26 VITALS
TEMPERATURE: 97.5 F | HEART RATE: 68 BPM | BODY MASS INDEX: 30.18 KG/M2 | DIASTOLIC BLOOD PRESSURE: 69 MMHG | WEIGHT: 164 LBS | OXYGEN SATURATION: 98 % | RESPIRATION RATE: 19 BRPM | HEIGHT: 62 IN | SYSTOLIC BLOOD PRESSURE: 140 MMHG

## 2024-02-26 DIAGNOSIS — R10.2 PELVIC PAIN: ICD-10-CM

## 2024-02-26 DIAGNOSIS — D50.9 IRON DEFICIENCY ANEMIA, UNSPECIFIED IRON DEFICIENCY ANEMIA TYPE: ICD-10-CM

## 2024-02-26 DIAGNOSIS — K62.5 RECTAL BLEEDING: ICD-10-CM

## 2024-02-26 DIAGNOSIS — R11.0 NAUSEA: ICD-10-CM

## 2024-02-26 LAB
EXT PREGNANCY TEST URINE: NEGATIVE
EXT. CONTROL: NORMAL

## 2024-02-26 PROCEDURE — 88305 TISSUE EXAM BY PATHOLOGIST: CPT | Performed by: STUDENT IN AN ORGANIZED HEALTH CARE EDUCATION/TRAINING PROGRAM

## 2024-02-26 PROCEDURE — 81025 URINE PREGNANCY TEST: CPT | Performed by: ANESTHESIOLOGY

## 2024-02-26 PROCEDURE — 88342 IMHCHEM/IMCYTCHM 1ST ANTB: CPT | Performed by: STUDENT IN AN ORGANIZED HEALTH CARE EDUCATION/TRAINING PROGRAM

## 2024-02-26 RX ORDER — ONDANSETRON 2 MG/ML
4 INJECTION INTRAMUSCULAR; INTRAVENOUS ONCE AS NEEDED
Status: CANCELLED | OUTPATIENT
Start: 2024-02-26

## 2024-02-26 RX ORDER — PROPOFOL 10 MG/ML
INJECTION, EMULSION INTRAVENOUS AS NEEDED
Status: DISCONTINUED | OUTPATIENT
Start: 2024-02-26 | End: 2024-02-26

## 2024-02-26 RX ORDER — METOCLOPRAMIDE HYDROCHLORIDE 5 MG/ML
10 INJECTION INTRAMUSCULAR; INTRAVENOUS ONCE AS NEEDED
Status: CANCELLED | OUTPATIENT
Start: 2024-02-26

## 2024-02-26 RX ORDER — LIDOCAINE HYDROCHLORIDE 10 MG/ML
INJECTION, SOLUTION EPIDURAL; INFILTRATION; INTRACAUDAL; PERINEURAL AS NEEDED
Status: DISCONTINUED | OUTPATIENT
Start: 2024-02-26 | End: 2024-02-26

## 2024-02-26 RX ORDER — DIPHENHYDRAMINE HYDROCHLORIDE 50 MG/ML
12.5 INJECTION INTRAMUSCULAR; INTRAVENOUS ONCE AS NEEDED
Status: CANCELLED | OUTPATIENT
Start: 2024-02-26

## 2024-02-26 RX ORDER — LIDOCAINE HYDROCHLORIDE 10 MG/ML
0.5 INJECTION, SOLUTION EPIDURAL; INFILTRATION; INTRACAUDAL; PERINEURAL ONCE AS NEEDED
Status: DISCONTINUED | OUTPATIENT
Start: 2024-02-26 | End: 2024-03-01 | Stop reason: HOSPADM

## 2024-02-26 RX ORDER — SODIUM CHLORIDE, SODIUM LACTATE, POTASSIUM CHLORIDE, CALCIUM CHLORIDE 600; 310; 30; 20 MG/100ML; MG/100ML; MG/100ML; MG/100ML
INJECTION, SOLUTION INTRAVENOUS CONTINUOUS PRN
Status: DISCONTINUED | OUTPATIENT
Start: 2024-02-26 | End: 2024-02-26

## 2024-02-26 RX ADMIN — PROPOFOL 120 MG: 10 INJECTION, EMULSION INTRAVENOUS at 14:17

## 2024-02-26 RX ADMIN — PROPOFOL 50 MG: 10 INJECTION, EMULSION INTRAVENOUS at 14:21

## 2024-02-26 RX ADMIN — SODIUM CHLORIDE, SODIUM LACTATE, POTASSIUM CHLORIDE, AND CALCIUM CHLORIDE: .6; .31; .03; .02 INJECTION, SOLUTION INTRAVENOUS at 13:54

## 2024-02-26 RX ADMIN — Medication 40 MG: at 14:32

## 2024-02-26 RX ADMIN — PROPOFOL 50 MG: 10 INJECTION, EMULSION INTRAVENOUS at 14:19

## 2024-02-26 RX ADMIN — PROPOFOL 100 MCG/KG/MIN: 10 INJECTION, EMULSION INTRAVENOUS at 14:27

## 2024-02-26 RX ADMIN — LIDOCAINE HYDROCHLORIDE 50 MG: 10 INJECTION, SOLUTION EPIDURAL; INFILTRATION; INTRACAUDAL at 14:13

## 2024-02-26 NOTE — ANESTHESIA POSTPROCEDURE EVALUATION
Post-Op Assessment Note    CV Status:  Stable  Pain Score: 0    Pain management: adequate       Mental Status:  Alert and awake   Hydration Status:  Euvolemic   PONV Controlled:  Controlled   Airway Patency:  Patent  Two or more mitigation strategies used for obstructive sleep apnea   Post Op Vitals Reviewed: Yes    No anethesia notable event occurred.    Staff: CRNA               BP   114/70   Temp   96.7   Pulse  81   Resp   14   SpO2   98%

## 2024-02-26 NOTE — H&P
History and Physical -  [facility]  Michaela Ware 39 y.o. female MRN: [id number]                  HPI: Michaela Ware is a 39 y.o. year old female who presents for colonoscopy/EGD      REVIEW OF SYSTEMS: Per the HPI, and otherwise unremarkable.    Historical Information   Past Medical History:   Diagnosis Date    B12 deficiency     Disease of thyroid gland     GERD (gastroesophageal reflux disease)     Hashimoto's disease     Hypertension     Sjogren's disease (HCC)      Past Surgical History:   Procedure Laterality Date    COLONOSCOPY      EGD AND COLONOSCOPY N/A 03/25/2016    Procedure: EGD AND COLONOSCOPY;  Surgeon: Aly Abreu MD;  Location: [facility];  Service:      Social History   Social History     Substance and Sexual Activity   Alcohol Use Yes    Comment: social, 3 per month     Social History     Substance and Sexual Activity   Drug Use No     Social History     Tobacco Use   Smoking Status Former    Types: Cigarettes    Start date: 6/18/2019   Smokeless Tobacco Never     Family History   Problem Relation Age of Onset    No Known Problems Mother     Heart attack Father     Heart disease Father     Hashimoto's thyroiditis Brother     No Known Problems Brother     Dementia Maternal Grandmother     Lymphoma Maternal Grandfather     Alzheimer's disease Paternal Grandmother     Deep vein thrombosis Paternal Grandfather        Meds/Allergies       Current Outpatient Medications:     cyanocobalamin (VITAMIN B-12) 1000 MCG tablet    Ferrous Sulfate (IRON PO)    levothyroxine (Synthroid) 75 mcg tablet    sertraline (ZOLOFT) 50 mg tablet    metroNIDAZOLE (METROCREAM) 0.75 % cream    Current Facility-Administered Medications:     lidocaine (PF) (XYLOCAINE-MPF) 1 % injection 0.5 mL, 0.5 mL, Infiltration, Once PRN    Allergies   Allergen Reactions    Peanut Oil - Food Allergy Cough and Other (See Comments)     oral    Peppermint Oil - Food Allergy Cough, Hives, Itching, Other (See Comments), Rash  "and Swelling     Sneezing.         Objective     /86   Pulse 87   Temp (!) 97.2 °F (36.2 °C) (Temporal)   Resp 19   Ht 5' 2\" (1.575 m)   Wt 74.4 kg (164 lb)   SpO2 98%   BMI 30.00 kg/m²       PHYSICAL EXAM    Gen: NAD  Head: NCAT  CV: RRR  CHEST: Clear  ABD: soft, NT/ND  EXT: no edema      ASSESSMENT/PLAN:  This is a 39 y.o. year old female here for colonoscopy, and she is stable and optimized for her procedure.       "

## 2024-02-26 NOTE — ANESTHESIA PREPROCEDURE EVALUATION
Procedure:  COLONOSCOPY  EGD    Relevant Problems   ANESTHESIA (within normal limits)      CARDIO (within normal limits)      ENDO   (+) Acquired hypothyroidism      GI/HEPATIC (within normal limits)      /RENAL (within normal limits)      GYN (within normal limits)      HEMATOLOGY   (+) Anemia      MUSCULOSKELETAL (within normal limits)      NEURO/PSYCH (within normal limits)      PULMONARY (within normal limits)        Physical Exam    Airway    Mallampati score: II  TM Distance: >3 FB  Neck ROM: full     Dental   No notable dental hx     Cardiovascular  Rhythm: regular, Rate: normal, Cardiovascular exam normal    Pulmonary  Pulmonary exam normal Breath sounds clear to auscultation    Other Findings  post-pubertal.      Anesthesia Plan  ASA Score- 2     Anesthesia Type- IV sedation with anesthesia with ASA Monitors.         Additional Monitors:     Airway Plan:            Plan Factors-Exercise tolerance (METS): >4 METS.    Chart reviewed. EKG reviewed. Imaging results reviewed. Existing labs reviewed. Patient summary reviewed.                  Induction- intravenous.    Postoperative Plan-     Informed Consent- Anesthetic plan and risks discussed with patient.  I personally reviewed this patient with the CRNA. Discussed and agreed on the Anesthesia Plan with the CRNA..

## 2024-02-28 PROCEDURE — 88305 TISSUE EXAM BY PATHOLOGIST: CPT | Performed by: STUDENT IN AN ORGANIZED HEALTH CARE EDUCATION/TRAINING PROGRAM

## 2024-02-28 PROCEDURE — 88342 IMHCHEM/IMCYTCHM 1ST ANTB: CPT | Performed by: STUDENT IN AN ORGANIZED HEALTH CARE EDUCATION/TRAINING PROGRAM

## 2024-03-04 ENCOUNTER — PREP FOR PROCEDURE (OUTPATIENT)
Dept: GASTROENTEROLOGY | Facility: CLINIC | Age: 39
End: 2024-03-04

## 2024-03-04 ENCOUNTER — PATIENT MESSAGE (OUTPATIENT)
Dept: ENDOCRINOLOGY | Facility: CLINIC | Age: 39
End: 2024-03-04

## 2024-03-04 DIAGNOSIS — E61.1 IRON DEFICIENCY: Primary | ICD-10-CM

## 2024-03-04 DIAGNOSIS — K31.A0 INTESTINAL METAPLASIA OF STOMACH: Primary | ICD-10-CM

## 2024-03-06 ENCOUNTER — LAB (OUTPATIENT)
Dept: LAB | Facility: CLINIC | Age: 39
End: 2024-03-06
Payer: COMMERCIAL

## 2024-03-06 DIAGNOSIS — E03.9 ACQUIRED HYPOTHYROIDISM: Primary | ICD-10-CM

## 2024-03-06 DIAGNOSIS — E61.1 IRON DEFICIENCY: ICD-10-CM

## 2024-03-06 LAB
FERRITIN SERPL-MCNC: 18 NG/ML (ref 11–307)
IRON SATN MFR SERPL: 14 % (ref 15–50)
IRON SERPL-MCNC: 59 UG/DL (ref 50–212)
T4 FREE SERPL-MCNC: 0.91 NG/DL (ref 0.61–1.12)
TIBC SERPL-MCNC: 431 UG/DL (ref 250–450)
TSH SERPL DL<=0.05 MIU/L-ACNC: 2.47 UIU/ML (ref 0.45–4.5)
UIBC SERPL-MCNC: 372 UG/DL (ref 155–355)

## 2024-03-06 PROCEDURE — 83540 ASSAY OF IRON: CPT

## 2024-03-06 PROCEDURE — 83550 IRON BINDING TEST: CPT

## 2024-03-06 PROCEDURE — 82728 ASSAY OF FERRITIN: CPT

## 2024-03-06 PROCEDURE — 84443 ASSAY THYROID STIM HORMONE: CPT

## 2024-03-06 PROCEDURE — 84439 ASSAY OF FREE THYROXINE: CPT

## 2024-03-06 PROCEDURE — 36415 COLL VENOUS BLD VENIPUNCTURE: CPT

## 2024-03-14 ENCOUNTER — OFFICE VISIT (OUTPATIENT)
Dept: ENDOCRINOLOGY | Facility: CLINIC | Age: 39
End: 2024-03-14
Payer: COMMERCIAL

## 2024-03-14 VITALS
HEART RATE: 73 BPM | DIASTOLIC BLOOD PRESSURE: 86 MMHG | HEIGHT: 62 IN | WEIGHT: 169 LBS | BODY MASS INDEX: 31.1 KG/M2 | OXYGEN SATURATION: 98 % | SYSTOLIC BLOOD PRESSURE: 124 MMHG

## 2024-03-14 DIAGNOSIS — E55.9 VITAMIN D DEFICIENCY: ICD-10-CM

## 2024-03-14 DIAGNOSIS — E03.8 HYPOTHYROIDISM DUE TO HASHIMOTO'S THYROIDITIS: Primary | ICD-10-CM

## 2024-03-14 DIAGNOSIS — E06.3 HYPOTHYROIDISM DUE TO HASHIMOTO'S THYROIDITIS: Primary | ICD-10-CM

## 2024-03-14 PROCEDURE — 99214 OFFICE O/P EST MOD 30 MIN: CPT | Performed by: PHYSICIAN ASSISTANT

## 2024-03-14 RX ORDER — LEVOTHYROXINE SODIUM 75 MCG
75 TABLET ORAL
Qty: 90 TABLET | Refills: 1 | Status: SHIPPED | OUTPATIENT
Start: 2024-03-14

## 2024-03-14 NOTE — PROGRESS NOTES
Patient Progress Note    CC: hypothyroidism     Referring Provider  No referring provider defined for this encounter.     History of Present Illness:     Patient is a 39-year-old female here for follow-up of hypothyroidism secondary to Hashimoto's thyroiditis.  Does have a medical history of Sjogren's syndrome and infertility.  She has normal menstrual cycles.  Patient is on Synthroid 75 mcg 1 tablet daily.  Patient is taking it 1 hr before breakfast on an empty stomach and at least 4 hrs apart from supplements.  Tolerating medication well. No history of external radiation to head/neck/chest.  No family history of thyroid cancer. No recent Iodine loading in form of medication, biotin or kelp supplements or radiological diagnostic studies.  Most recent TSH normal at 2.470 and free T4 normal at 0.91.    She is trying to eat healthy and exercising routinely.     Vitamin D: she takes vitamin D3 1000 IU inconsistently.    Patient Active Problem List   Diagnosis    Anemia    Acquired hypothyroidism    Hypothyroidism due to Hashimoto's thyroiditis    Vitamin D deficiency     Past Medical History:   Diagnosis Date    B12 deficiency     Disease of thyroid gland     GERD (gastroesophageal reflux disease)     Hashimoto's disease     Hypertension     Sjogren's disease (HCC)       Past Surgical History:   Procedure Laterality Date    COLONOSCOPY      EGD AND COLONOSCOPY N/A 03/25/2016    Procedure: EGD AND COLONOSCOPY;  Surgeon: Aly Abreu MD;  Location: [facility];  Service:       Family History   Problem Relation Age of Onset    No Known Problems Mother     Heart attack Father     Heart disease Father     Hashimoto's thyroiditis Brother     No Known Problems Brother     Dementia Maternal Grandmother     Lymphoma Maternal Grandfather     Alzheimer's disease Paternal Grandmother     Deep vein thrombosis Paternal Grandfather      Social History     Tobacco Use    Smoking status: Former     Types: Cigarettes     Start date:  "6/18/2019    Smokeless tobacco: Never   Substance Use Topics    Alcohol use: Yes     Comment: social, 3 per month     Allergies   Allergen Reactions    Peanut Oil - Food Allergy Cough and Other (See Comments)     oral    Peppermint Oil - Food Allergy Cough, Hives, Itching, Other (See Comments), Rash and Swelling     Sneezing.       Current Outpatient Medications   Medication Sig Dispense Refill    cyanocobalamin (VITAMIN B-12) 1000 MCG tablet Take 2,000 mcg by mouth daily      Ferrous Sulfate (IRON PO) Take by mouth      sertraline (ZOLOFT) 50 mg tablet       Synthroid 75 MCG tablet Take 1 tablet (75 mcg total) by mouth daily in the early morning 90 tablet 1    metroNIDAZOLE (METROCREAM) 0.75 % cream Apply topically 2 (two) times a day Apply 1-2x/day to face for rosacea 45 g 3     No current facility-administered medications for this visit.         Review of Systems   Constitutional:  Positive for fatigue (mild). Negative for activity change, appetite change and unexpected weight change.   HENT:  Negative for trouble swallowing.    Eyes:  Negative for visual disturbance.   Respiratory:  Negative for shortness of breath.    Cardiovascular:  Positive for palpitations (occasional, brief episode). Negative for chest pain.   Gastrointestinal:  Positive for constipation (did go for EGD and colonoscopy recently). Negative for diarrhea.   Endocrine: Negative for cold intolerance and heat intolerance.   Musculoskeletal:  Positive for arthralgias.   Skin: Negative.    Neurological:  Negative for tremors.   Psychiatric/Behavioral:  The patient is nervous/anxious (managed).        Physical Exam:  Body mass index is 30.91 kg/m².  /86   Pulse 73   Ht 5' 2\" (1.575 m)   Wt 76.7 kg (169 lb)   SpO2 98%   BMI 30.91 kg/m²    Wt Readings from Last 3 Encounters:   03/14/24 76.7 kg (169 lb)   02/26/24 74.4 kg (164 lb)   01/10/24 78.5 kg (173 lb)       Physical Exam  Vitals and nursing note reviewed.   Constitutional:       " "Appearance: She is well-developed.   HENT:      Head: Normocephalic.   Eyes:      General: No scleral icterus.     Pupils: Pupils are equal, round, and reactive to light.   Neck:      Thyroid: No thyromegaly.   Cardiovascular:      Rate and Rhythm: Normal rate and regular rhythm.      Pulses:           Radial pulses are 2+ on the right side and 2+ on the left side.      Heart sounds: No murmur heard.  Pulmonary:      Effort: Pulmonary effort is normal. No respiratory distress.      Breath sounds: Normal breath sounds. No wheezing.   Musculoskeletal:      Cervical back: Neck supple.   Skin:     General: Skin is warm and dry.   Neurological:      Mental Status: She is alert.      Deep Tendon Reflexes: Reflexes are normal and symmetric.       Patient's shoes and socks were not removed.          Labs:   No results found for: \"HGBA1C\"    Lab Results   Component Value Date    HDL 64 (H) 02/17/2016    TRIG 137 02/17/2016       Lab Results   Component Value Date    GLUCOSE 101 05/07/2015    CALCIUM 8.7 11/08/2019     05/07/2015    K 3.4 (L) 11/08/2019    CO2 25 11/08/2019     11/08/2019    BUN 9 11/08/2019    CREATININE 0.90 11/08/2019        GFR, Calculated   Date Value Ref Range Status   08/25/2019 77 >60 mL/min/1.73m2 Final     Comment:     mL/min per 1.73 square meters                                            Normal Function or Mild Renal    Disease (if clinically at risk):  >or=60  Moderately Decreased:                30-59  Severely Decreased:                  15-29  Renal Failure:                         <15                                            -American GFR: multiply reported GFR by 1.16    Please note that the eGFR is based on the CKD-EPI calculation, and is not intended to be used for drug dosing.                                            Note: Calculated GFR may not be an accurate indicator of renal function if the patient's renal function is not in a steady state.     eGFR   Date " Value Ref Range Status   11/08/2019 83 ml/min/1.73sq m Final       Lab Results   Component Value Date    ALT 15 11/08/2019    AST 14 11/08/2019    ALKPHOS 68 11/08/2019    BILITOT 0.2 05/07/2015       Lab Results   Component Value Date    GZF9LEAZZRRT 2.470 03/06/2024    TSH 0.33 (L) 08/25/2019    M9XYOXR 1.2 02/17/2016    P1IHYKT 9.8 02/17/2016         Plan:    Diagnoses and all orders for this visit:    Hypothyroidism due to Hashimoto's thyroiditis  TSH normal at 2.470 and free T4 normal at 0.91.  Continue current treatment  Monitor labs   She is aware to notify us if she becomes pregnant as she will need a dose change.   -     T4, free; Future  -     TSH, 3rd generation; Future  -     Synthroid 75 MCG tablet; Take 1 tablet (75 mcg total) by mouth daily in the early morning    Vitamin D deficiency  Check vitamin D level  Take vitamin D3 1000 IU daily   -     Vitamin D 25 hydroxy; Future          Discussed with the patient and all questions fully answered. She will call me if any problems arise.    Counseled patient on diagnostic results, prognosis, risk and benefit of treatment options, instruction for management, importance of treatment compliance, risk  factor reduction and impressions      Marisol Mullins PA-C

## 2024-04-09 ENCOUNTER — OFFICE VISIT (OUTPATIENT)
Dept: FAMILY MEDICINE CLINIC | Facility: CLINIC | Age: 39
End: 2024-04-09
Payer: COMMERCIAL

## 2024-04-09 VITALS
WEIGHT: 166.8 LBS | HEART RATE: 87 BPM | HEIGHT: 63 IN | DIASTOLIC BLOOD PRESSURE: 90 MMHG | BODY MASS INDEX: 29.55 KG/M2 | RESPIRATION RATE: 16 BRPM | SYSTOLIC BLOOD PRESSURE: 120 MMHG | OXYGEN SATURATION: 98 % | TEMPERATURE: 98.7 F

## 2024-04-09 DIAGNOSIS — F32.A MILD DEPRESSION: ICD-10-CM

## 2024-04-09 DIAGNOSIS — R40.0 DAYTIME SLEEPINESS: ICD-10-CM

## 2024-04-09 DIAGNOSIS — D50.8 OTHER IRON DEFICIENCY ANEMIA: ICD-10-CM

## 2024-04-09 DIAGNOSIS — E55.9 VITAMIN D DEFICIENCY: ICD-10-CM

## 2024-04-09 DIAGNOSIS — Z00.00 ANNUAL PHYSICAL EXAM: Primary | ICD-10-CM

## 2024-04-09 DIAGNOSIS — H93.11 TINNITUS OF RIGHT EAR: ICD-10-CM

## 2024-04-09 PROBLEM — E06.3 HASHIMOTO'S DISEASE: Status: RESOLVED | Noted: 2024-04-09 | Resolved: 2024-04-09

## 2024-04-09 PROBLEM — E06.3 HASHIMOTO'S DISEASE: Status: ACTIVE | Noted: 2024-04-09

## 2024-04-09 PROBLEM — D50.9 IRON (FE) DEFICIENCY ANEMIA: Status: ACTIVE | Noted: 2023-08-25

## 2024-04-09 PROCEDURE — 99203 OFFICE O/P NEW LOW 30 MIN: CPT | Performed by: FAMILY MEDICINE

## 2024-04-09 PROCEDURE — 99386 PREV VISIT NEW AGE 40-64: CPT | Performed by: FAMILY MEDICINE

## 2024-04-09 NOTE — PROGRESS NOTES
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  They state that they have done well and are having no problems.    Vital Signs:  Vitals:    10/19/21 1551   BP: 120/62   Pulse: 89   Resp: 18   Temp: 97.7 °F (36.5 °C)   TempSrc: Temporal   SpO2: 96%   Weight: 103 kg (227 lb 1.2 oz)   Height: 172.7 cm (68\")       Physical Exam:   General Appearance:    Alert, cooperative, in no acute distress, wound clean dry without infection   Abdomen:     no masses, no organomegaly, soft non-tender, non-distended, no guarding, wounds are well healed   Chest:      Clear to ausculation       Assessment / Plan:    1. Postoperative visit        I did discuss the situation with the patient today in the office and they have done well from their recent total thyroidectomy, I don't think that the patient needs any further intervention and I need to see them back only if they have further problems. Pathology report was reviewed with the patient in the office.    I have asked her to follow-up with her primary care provider, she will need to be on thyroid hormone replacement for life.  Pathology showed no evidence of carcinoma.    Electronically signed by Jeronimo Garcia MD  10/19/21                      " ADULT ANNUAL PHYSICAL  [facility]    NAME: Michaela Ware  AGE: 39 y.o. SEX: female  : 1985     DATE: 2024     Assessment and Plan:     Problem List Items Addressed This Visit        Behavioral Health    Mild depression     Started zoloft in 2023 and is helping   She went through telehealth thru job  Sees a therapist            Relevant Orders    Comprehensive metabolic panel       Blood    Iron (Fe) deficiency anemia     Taking over the counter Ferrous sulfate 324 mg daily          Relevant Orders    CBC and differential    Iron Panel (Includes Ferritin, Iron Sat%, Iron, and TIBC)    CBC and differential       Other    Vitamin D deficiency    Relevant Orders    Vitamin D 25 hydroxy    Tinnitus of right ear    Relevant Orders    Ambulatory Referral to Otolaryngology   Other Visit Diagnoses     Annual physical exam    -  Primary    Daytime sleepiness        Relevant Orders    Ambulatory Referral to Sleep Medicine          Immunizations and preventive care screenings were discussed with patient today. Appropriate education was printed on patient's after visit summary.    Counseling:  Alcohol/drug use: discussed moderation in alcohol intake, the recommendations for healthy alcohol use, and avoidance of illicit drug use.  Dental Health: discussed importance of regular tooth brushing, flossing, and dental visits.  Injury prevention: discussed safety/seat belts, safety helmets, smoke detectors, carbon dioxide detectors, and smoking near bedding or upholstery.  Sexual health: discussed sexually transmitted diseases, partner selection, use of condoms, avoidance of unintended pregnancy, and contraceptive alternatives.  Exercise: the importance of regular exercise/physical activity was discussed. Recommend exercise 3-5 times per week for at least 30 minutes.          No follow-ups on file.     Chief Complaint:     Chief Complaint   Patient presents with   •  Establish Care     Patient being seen for New Patient -Establish care   • Anemia     Patient being seen for Anemia      History of Present Illness:     Adult Annual Physical   Patient here for a comprehensive physical exam. The patient reports problems - new patient. Trouble losing weight  . Trying to conceive at this time. + fibroid and adenomyosis .  for 5 years. + ringing of her right ear for 1 month.   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   Exercise: 5-7 times a week on average.      Depression Screening  PHQ-2/9 Depression Screening    Little interest or pleasure in doing things: 1 - several days  Feeling down, depressed, or hopeless: 1 - several days  PHQ-2 Score: 2  PHQ-2 Interpretation: Negative depression screen       General Health  Sleep: sleeps well.   Hearing: normal - bilateral.  Vision: goes for regular eye exams.   Dental: regular dental visits and brushes teeth twice daily.       /GYN Health  Follows with gynecology? yes   Patient is: premenopausal  Last menstrual period:   Contraceptive method:  none .    Advanced Care Planning  Do you have an advanced directive? no  Do you have a durable medical power of ? no  ACP document given to the patient? yes     Review of Systems:     Review of Systems   Constitutional: Negative.    HENT: Negative.     Eyes: Negative.    Respiratory: Negative.     Cardiovascular: Negative.    Gastrointestinal: Negative.    Endocrine: Negative.    Genitourinary: Negative.    Musculoskeletal: Negative.    Skin: Negative.    Allergic/Immunologic: Negative.    Neurological: Negative.    Hematological: Negative.    Psychiatric/Behavioral: Negative.        Past Medical History:     Past Medical History:   Diagnosis Date   • Allergic    • Anemia 08/2023   • Arthritis    • B12 deficiency    • Disease of thyroid gland    • GERD (gastroesophageal reflux disease)    • Hashimoto's  disease    • Hypertension    • Sjogren's disease (HCC)       Past Surgical History:     Past Surgical History:   Procedure Laterality Date   • COLONOSCOPY     • EGD AND COLONOSCOPY N/A 03/25/2016    Procedure: EGD AND COLONOSCOPY;  Surgeon: Aly Abreu MD;  Location:  GI LAB;  Service:       Social History:     Social History     Socioeconomic History   • Marital status: /Civil Union     Spouse name: None   • Number of children: None   • Years of education: None   • Highest education level: None   Occupational History   • None   Tobacco Use   • Smoking status: Former     Types: Cigarettes     Start date: 6/18/2019   • Smokeless tobacco: Never   Vaping Use   • Vaping status: Never Used   Substance and Sexual Activity   • Alcohol use: Yes     Comment: social, 3 per month   • Drug use: No   • Sexual activity: Yes     Partners: Male     Birth control/protection: None   Other Topics Concern   • None   Social History Narrative   • None     Social Determinants of Health     Financial Resource Strain: Not on file   Food Insecurity: Not on file   Transportation Needs: Not on file   Physical Activity: Not on file   Stress: Not on file   Social Connections: Not on file   Intimate Partner Violence: Not on file   Housing Stability: Not on file      Family History:     Family History   Problem Relation Age of Onset   • Arthritis Mother    • Heart attack Father    • Heart disease Father    • Hypertension Father    • Thyroid disease Father    • Arthritis Father    • Autoimmune disease Father    • Hashimoto's thyroiditis Brother    • No Known Problems Brother    • Dementia Maternal Grandmother    • Diabetes Maternal Grandmother    • Asthma Maternal Grandmother    • Arthritis Maternal Grandmother    • Lymphoma Maternal Grandfather    • Cancer Maternal Grandfather    • Alzheimer's disease Paternal Grandmother    • Arthritis Paternal Grandmother    • Deep vein thrombosis Paternal Grandfather    • Stroke Paternal Aunt    •  "Stroke Paternal Aunt    • Thyroid disease Brother    • Autoimmune disease Brother    • Autoimmune disease Paternal Aunt    • Cancer Paternal Uncle       Current Medications:     Current Outpatient Medications   Medication Sig Dispense Refill   • cyanocobalamin (VITAMIN B-12) 1000 MCG tablet Take 2,000 mcg by mouth daily     • Ferrous Sulfate (IRON PO) Take by mouth     • metroNIDAZOLE (METROCREAM) 0.75 % cream Apply topically 2 (two) times a day Apply 1-2x/day to face for rosacea 45 g 3   • sertraline (ZOLOFT) 50 mg tablet      • Synthroid 75 MCG tablet Take 1 tablet (75 mcg total) by mouth daily in the early morning 90 tablet 1     No current facility-administered medications for this visit.      Allergies:     Allergies   Allergen Reactions   • Peanut Oil - Food Allergy Cough and Other (See Comments)     oral   • Peppermint Oil - Food Allergy Cough, Hives, Itching, Other (See Comments), Rash and Swelling     Sneezing.        Physical Exam:     /90 (BP Location: Left arm, Patient Position: Sitting, Cuff Size: Standard)   Pulse 87   Temp 98.7 °F (37.1 °C) (Tympanic)   Resp 16   Ht 5' 2.64\" (1.591 m)   Wt 75.7 kg (166 lb 12.8 oz)   SpO2 98%   BMI 29.89 kg/m²     Physical Exam  Constitutional:       Appearance: She is well-developed.   HENT:      Head: Normocephalic and atraumatic.      Nose: Nose normal.      Mouth/Throat:      Mouth: Mucous membranes are moist.   Eyes:      Pupils: Pupils are equal, round, and reactive to light.   Cardiovascular:      Rate and Rhythm: Normal rate and regular rhythm.      Pulses: Normal pulses.      Heart sounds: Normal heart sounds.   Pulmonary:      Effort: Pulmonary effort is normal. No respiratory distress.      Breath sounds: Normal breath sounds. No wheezing.   Abdominal:      General: Bowel sounds are normal.      Palpations: Abdomen is soft.   Musculoskeletal:         General: No deformity. Normal range of motion.      Cervical back: Normal range of motion and " neck supple.   Skin:     General: Skin is warm.      Capillary Refill: Capillary refill takes less than 2 seconds.   Neurological:      General: No focal deficit present.      Mental Status: She is alert and oriented to person, place, and time.   Psychiatric:         Mood and Affect: Mood normal.         Behavior: Behavior normal.          Aleah Mcdonald MD  [facility]

## 2024-04-09 NOTE — ASSESSMENT & PLAN NOTE
Started zoloft in 12/2023 and is helping   She went through telehealth thru job  Sees a therapist

## 2024-04-09 NOTE — PROGRESS NOTES
Name: Michaela Ware      : 1985      MRN: [id number]  Encounter Provider: Aleah Mcdonald MD  Encounter Date: 2024   Encounter department: [facility]    Assessment & Plan     {There are no diagnoses linked to this encounter. (Refresh or delete this SmartLink)}       Subjective     HPI  Review of Systems    Past Medical History:   Diagnosis Date   • Allergic    • Anemia 2023   • Arthritis    • B12 deficiency    • Disease of thyroid gland    • GERD (gastroesophageal reflux disease)    • Hashimoto's disease    • Hypertension    • Sjogren's disease (HCC)      Past Surgical History:   Procedure Laterality Date   • COLONOSCOPY     • EGD AND COLONOSCOPY N/A 2016    Procedure: EGD AND COLONOSCOPY;  Surgeon: Aly Abreu MD;  Location: [facility];  Service:      Family History   Problem Relation Age of Onset   • Arthritis Mother    • Heart attack Father    • Heart disease Father    • Hypertension Father    • Thyroid disease Father    • Arthritis Father    • Autoimmune disease Father    • Hashimoto's thyroiditis Brother    • No Known Problems Brother    • Dementia Maternal Grandmother    • Diabetes Maternal Grandmother    • Asthma Maternal Grandmother    • Arthritis Maternal Grandmother    • Lymphoma Maternal Grandfather    • Cancer Maternal Grandfather    • Alzheimer's disease Paternal Grandmother    • Arthritis Paternal Grandmother    • Deep vein thrombosis Paternal Grandfather    • Stroke Paternal Aunt    • Stroke Paternal Aunt    • Thyroid disease Brother    • Autoimmune disease Brother    • Autoimmune disease Paternal Aunt    • Cancer Paternal Uncle      Social History     Socioeconomic History   • Marital status: /Civil Union     Spouse name: None   • Number of children: None   • Years of education: None   • Highest education level: None   Occupational History   • None   Tobacco Use   • Smoking status: Former     Types: Cigarettes     Start date: 2019   • Smokeless  "tobacco: Never   Vaping Use   • Vaping status: Never Used   Substance and Sexual Activity   • Alcohol use: Yes     Comment: social, 3 per month   • Drug use: No   • Sexual activity: Yes     Partners: Male     Birth control/protection: None   Other Topics Concern   • None   Social History Narrative   • None     Social Determinants of Health     Financial Resource Strain: Not on file   Food Insecurity: Not on file   Transportation Needs: Not on file   Physical Activity: Not on file   Stress: Not on file   Social Connections: Not on file   Intimate Partner Violence: Not on file   Housing Stability: Not on file     Current Outpatient Medications on File Prior to Visit   Medication Sig   • cyanocobalamin (VITAMIN B-12) 1000 MCG tablet Take 2,000 mcg by mouth daily   • Ferrous Sulfate (IRON PO) Take by mouth   • metroNIDAZOLE (METROCREAM) 0.75 % cream Apply topically 2 (two) times a day Apply 1-2x/day to face for rosacea   • sertraline (ZOLOFT) 50 mg tablet    • Synthroid 75 MCG tablet Take 1 tablet (75 mcg total) by mouth daily in the early morning     Allergies   Allergen Reactions   • Peanut Oil - Food Allergy Cough and Other (See Comments)     oral   • Peppermint Oil - Food Allergy Cough, Hives, Itching, Other (See Comments), Rash and Swelling     Sneezing.       Immunization History   Administered Date(s) Administered   • COVID-19 PFIZER VACCINE 0.3 ML IM 04/10/2021, 05/05/2021   • Hep B, Adolescent or Pediatric 07/28/1998, 02/09/1999, 06/22/1999   • Td (adult), adsorbed 08/13/2002       Objective     /90 (BP Location: Left arm, Patient Position: Sitting, Cuff Size: Standard)   Pulse 87   Temp 98.7 °F (37.1 °C) (Tympanic)   Resp 16   Ht 5' 2.64\" (1.591 m)   Wt 75.7 kg (166 lb 12.8 oz)   SpO2 98%   BMI 29.89 kg/m²     Physical Exam  Aleah Mcdonald MD    "

## 2024-04-10 DIAGNOSIS — R40.0 DAYTIME SLEEPINESS: Primary | ICD-10-CM

## 2024-04-11 ENCOUNTER — TELEPHONE (OUTPATIENT)
Dept: GASTROENTEROLOGY | Facility: CLINIC | Age: 39
End: 2024-04-11

## 2024-04-12 NOTE — TELEPHONE ENCOUNTER
Patient called and was wondering if she needs to do her egd sooner than 6 months? Because the message that was left it said to have it done in May? Please review and reach out to let her know thank you

## 2024-05-06 ENCOUNTER — OFFICE VISIT (OUTPATIENT)
Dept: OBGYN CLINIC | Facility: CLINIC | Age: 39
End: 2024-05-06
Payer: COMMERCIAL

## 2024-05-06 VITALS
WEIGHT: 169 LBS | SYSTOLIC BLOOD PRESSURE: 120 MMHG | DIASTOLIC BLOOD PRESSURE: 80 MMHG | BODY MASS INDEX: 29.95 KG/M2 | HEIGHT: 63 IN

## 2024-05-06 DIAGNOSIS — R10.2 PELVIC PAIN: ICD-10-CM

## 2024-05-06 DIAGNOSIS — N92.0 MENORRHAGIA WITH REGULAR CYCLE: Primary | ICD-10-CM

## 2024-05-06 DIAGNOSIS — N89.8 VAGINAL ODOR: ICD-10-CM

## 2024-05-06 DIAGNOSIS — N94.6 DYSMENORRHEA: ICD-10-CM

## 2024-05-06 PROCEDURE — 99215 OFFICE O/P EST HI 40 MIN: CPT | Performed by: OBSTETRICS & GYNECOLOGY

## 2024-05-06 RX ORDER — BUPROPION HYDROCHLORIDE 150 MG/1
TABLET ORAL
COMMUNITY
Start: 2024-04-25

## 2024-05-07 LAB
BV BACTERIA RRNA VAG QL NAA+PROBE: NEGATIVE
C GLABRATA RNA VAG QL NAA+PROBE: NOT DETECTED
CANDIDA RRNA VAG QL PROBE: NOT DETECTED
T VAGINALIS RRNA SPEC QL NAA+PROBE: NOT DETECTED

## 2024-05-07 NOTE — PROGRESS NOTES
"Assessment/Plan:     Diagnoses and all orders for this visit:    Menorrhagia with regular cycle    Dysmenorrhea    Pelvic pain    We discussed briefly options for pelvic pain and management of heavy menstrual bleeding. Discussed that total hysterectomy is guaranteed to treat her bleeding and considering the connection of her pain with her menstrual cycle is highly likely to help with her pain though not guaranteed. We also discussed that the hysterectomy is performed either vaginally or laparoscopically and can likely be done minimally invasive. Patient does have fibroid uterus and with nulliparous status would likely benefit from a laparoscopic approach, namely a robotic assisted total laparoscopic hysterectomy. We discussed that removal of her uterus would ultimately mean that she would never be able to carry a future pregnancy and with that I highly recommend removal of bilateral fallopian tubes in order to reduce her risk of ovarian cancer in the future. I recommend that her bilateral ovaries remain in place in order to allow for menopause to occur naturally and avoid the sudden onset of surgical menopause.     Vaginal odor  -     SURESWAB ADVANCED VAGINITIS, TMA    Other orders  -     buPROPion (WELLBUTRIN XL) 150 mg 24 hr tablet          I have spent a total time of 45 minutes on 05/26/24 in caring for this patient including Prognosis, Risks and benefits of tx options, Patient and family education, Impressions, Counseling / Coordination of care, Documenting in the medical record, Reviewing / ordering tests, medicine, procedures  , Obtaining or reviewing history  , and Communicating with other healthcare professionals .      Subjective:    Patient ID: Michaela Ware is a 39 y.o. female.  Chief Complaint   Patient presents with    Vaginal Pain     Pt states she is having continuous right sided vaginal pain. Pt mentioned trying to have sexual intercourse with partner and there was \"something felt/ like a blockage " "or pressure.\"        Michaela is a 40yo G0 presenting today for a gyn problem visit. She called with concerns for a stronger vaginal odor than she has had in the past and with concerns for vaginal pathology due to her 's discomfort with intercourse last night. Michaela reports that her  was attempting penetration and stated that it felt as though he was \"hitting something\" in the vagina that was causing him discomfort to continue intercourse. Michaela denies significant pain during this interaction.     Michaela also desires to discuss possible definitive treatment options for her chronic pelvic pain, dysmenorrhea, and heavy menstrual bleeding. She has significant pelvic pain associated with the week prior to her menses, the week of her menses, and the week following occurring every month for approximately the last 8 months to a year. She reports significant pain as well as fatigue and lightheadedness occurring with her heavy menses that is beginning to affect her daily life.        The following portions of the patient's history were reviewed and updated as appropriate: allergies, current medications, past family history, past medical history, past social history, past surgical history, and problem list.    Review of Systems   Constitutional:  Negative for chills, diaphoresis and fever.   Respiratory:  Negative for shortness of breath.    Cardiovascular:  Negative for chest pain.   Gastrointestinal:  Positive for abdominal pain. Negative for constipation, diarrhea, nausea and vomiting.   Genitourinary:  Positive for dyspareunia, menstrual problem, pelvic pain and vaginal pain. Negative for vaginal bleeding and vaginal discharge.   Musculoskeletal:  Positive for back pain.         Objective:  /80 (BP Location: Left arm, Patient Position: Sitting, Cuff Size: Standard)   Ht 5' 2.64\" (1.591 m)   Wt 76.7 kg (169 lb)   LMP 04/13/2024 (Exact Date)   BMI 30.28 kg/m²   Physical Exam  Constitutional:      "  General: She is not in acute distress.     Appearance: Normal appearance. She is normal weight. She is not diaphoretic.   Genitourinary:      Vulva normal.      Right Labia: No rash, tenderness or lesions.     Left Labia: No tenderness, lesions or rash.     Vaginal tenderness present.      No vaginal discharge, erythema or bleeding.      No vaginal prolapse present.     No vaginal atrophy present.       Right Adnexa: not tender, not full and no mass present.     Left Adnexa: not tender, not full and no mass present.     Cervix is nulliparous.      No cervical motion tenderness, discharge, friability, lesion or polyp.      Uterus is enlarged (about 10 weeks in size) and tender.      Uterus is not fixed or prolapsed.      Uterus is midaxial.   HENT:      Head: Normocephalic and atraumatic.   Pulmonary:      Effort: Pulmonary effort is normal. No respiratory distress.   Musculoskeletal:      Right lower leg: No edema.      Left lower leg: No edema.   Neurological:      Mental Status: She is alert and oriented to person, place, and time.   Skin:     General: Skin is warm and dry.      Coloration: Skin is not pale.   Psychiatric:         Mood and Affect: Mood normal.         Behavior: Behavior normal.         Thought Content: Thought content normal.         Judgment: Judgment normal.   Vitals and nursing note reviewed.

## 2024-05-31 ENCOUNTER — OFFICE VISIT (OUTPATIENT)
Dept: URGENT CARE | Facility: CLINIC | Age: 39
End: 2024-05-31
Payer: COMMERCIAL

## 2024-05-31 VITALS
DIASTOLIC BLOOD PRESSURE: 101 MMHG | RESPIRATION RATE: 14 BRPM | WEIGHT: 169 LBS | BODY MASS INDEX: 29.95 KG/M2 | TEMPERATURE: 98 F | OXYGEN SATURATION: 99 % | HEIGHT: 63 IN | SYSTOLIC BLOOD PRESSURE: 160 MMHG | HEART RATE: 88 BPM

## 2024-05-31 DIAGNOSIS — J01.00 ACUTE NON-RECURRENT MAXILLARY SINUSITIS: ICD-10-CM

## 2024-05-31 DIAGNOSIS — H66.003 NON-RECURRENT ACUTE SUPPURATIVE OTITIS MEDIA OF BOTH EARS WITHOUT SPONTANEOUS RUPTURE OF TYMPANIC MEMBRANES: Primary | ICD-10-CM

## 2024-05-31 PROCEDURE — G0382 LEV 3 HOSP TYPE B ED VISIT: HCPCS | Performed by: NURSE PRACTITIONER

## 2024-05-31 PROCEDURE — S9083 URGENT CARE CENTER GLOBAL: HCPCS | Performed by: NURSE PRACTITIONER

## 2024-05-31 RX ORDER — AMOXICILLIN AND CLAVULANATE POTASSIUM 875; 125 MG/1; MG/1
1 TABLET, FILM COATED ORAL EVERY 12 HOURS SCHEDULED
Qty: 20 TABLET | Refills: 0 | Status: SHIPPED | OUTPATIENT
Start: 2024-05-31 | End: 2024-06-10

## 2024-05-31 NOTE — PROGRESS NOTES
[facility]        NAME: Michaela Ware is a 39 y.o. female  : 1985    MRN: [id number]  DATE: May 31, 2024  TIME: 12:55 PM    Assessment and Plan   Non-recurrent acute suppurative otitis media of both ears without spontaneous rupture of tympanic membranes [H66.003]  1. Non-recurrent acute suppurative otitis media of both ears without spontaneous rupture of tympanic membranes  amoxicillin-clavulanate (AUGMENTIN) 875-125 mg per tablet      2. Acute non-recurrent maxillary sinusitis  amoxicillin-clavulanate (AUGMENTIN) 875-125 mg per tablet        Recommended stopping Sudafed due to blood pressure elevations. Augmentin twice a day for 10 days. Continue Flonase. May use Tylenol.     Patient Instructions   Augmentin twice a day for 10 days  Flonase 1 spray each nostril daily.  Saline nasal spray as directed to rinse sinus passages.  Increase your fluid intake.  Tylenol and/or Motrin as needed for pain or fever.  Follow up with your PCP for worsening or concerning symptoms    Follow up with PCP in 3-5 days.  Proceed to  ER if symptoms worsen.    Chief Complaint     Chief Complaint   Patient presents with    Cold Like Symptoms     URI s/s with facial pain, right sided ear pain and congestion          History of Present Illness       Patient is a 39 year old female presenting with 5 days of sinus congestion, post nasal drip, loss of voice, and right ear pain. She also has maxillary facial pressure with dental pain. Denies fever or chills. She is taking OTC Sudafed and Flonase.         Review of Systems   Review of Systems   Constitutional:  Negative for activity change, chills and fever.   HENT:  Positive for congestion, ear pain, postnasal drip, rhinorrhea, sore throat and voice change. Negative for ear discharge, sinus pressure and sinus pain.    Respiratory:  Positive for cough. Negative for shortness of breath.    Gastrointestinal:  Negative for abdominal pain.   Musculoskeletal:  Negative for myalgias.    Neurological:  Negative for headaches.         Current Medications       Current Outpatient Medications:     amoxicillin-clavulanate (AUGMENTIN) 875-125 mg per tablet, Take 1 tablet by mouth every 12 (twelve) hours for 10 days, Disp: 20 tablet, Rfl: 0    buPROPion (WELLBUTRIN XL) 150 mg 24 hr tablet, , Disp: , Rfl:     cyanocobalamin (VITAMIN B-12) 1000 MCG tablet, Take 2,000 mcg by mouth daily, Disp: , Rfl:     Ferrous Sulfate (IRON PO), Take by mouth, Disp: , Rfl:     metroNIDAZOLE (METROCREAM) 0.75 % cream, Apply topically 2 (two) times a day Apply 1-2x/day to face for rosacea, Disp: 45 g, Rfl: 3    sertraline (ZOLOFT) 50 mg tablet, , Disp: , Rfl:     Synthroid 75 MCG tablet, Take 1 tablet (75 mcg total) by mouth daily in the early morning, Disp: 90 tablet, Rfl: 1    Current Allergies     Allergies as of 05/31/2024 - Reviewed 05/06/2024   Allergen Reaction Noted    Peanut oil - food allergy Cough and Other (See Comments) 08/26/2016    Peppermint oil - food allergy Cough, Hives, Itching, Other (See Comments), Rash, and Swelling 08/26/2016            The following portions of the patient's history were reviewed and updated as appropriate: allergies, current medications, past family history, past medical history, past social history, past surgical history and problem list.     Past Medical History:   Diagnosis Date    Allergic     Anemia 08/2023    Arthritis     B12 deficiency     Disease of thyroid gland     Fibroid     GERD (gastroesophageal reflux disease)     Hashimoto's disease     Hypertension     Hypothyroidism     Sjogren's disease (HCC)     Varicella        Past Surgical History:   Procedure Laterality Date    BARIATRIC SURGERY  abdominal lipo    12/2017    COLONOSCOPY      EGD AND COLONOSCOPY N/A 03/25/2016    Procedure: EGD AND COLONOSCOPY;  Surgeon: Aly Abreu MD;  Location: [facility];  Service:        Family History   Problem Relation Age of Onset    Arthritis Mother     Heart attack Father      "Heart disease Father     Hypertension Father     Thyroid disease Father     Arthritis Father     Autoimmune disease Father     Hashimoto's thyroiditis Brother     No Known Problems Brother     Dementia Maternal Grandmother     Diabetes Maternal Grandmother     Asthma Maternal Grandmother     Arthritis Maternal Grandmother     Lymphoma Maternal Grandfather     Cancer Maternal Grandfather     Alzheimer's disease Paternal Grandmother     Arthritis Paternal Grandmother     Deep vein thrombosis Paternal Grandfather     Heart attack Paternal Grandfather     Stroke Paternal Aunt     Stroke Paternal Aunt     Thyroid disease Brother     Autoimmune disease Brother     Autoimmune disease Paternal Aunt     Cancer Paternal Uncle          Medications have been verified.        Objective   BP (!) 160/101   Pulse 88   Temp 98 °F (36.7 °C)   Resp 14   Ht 5' 3\" (1.6 m)   Wt 76.7 kg (169 lb)   LMP 04/13/2024 (Exact Date)   SpO2 99%   BMI 29.94 kg/m²        Physical Exam     Physical Exam  Vitals reviewed.   Constitutional:       General: She is awake. She is not in acute distress.     Appearance: Normal appearance. She is normal weight. She is not ill-appearing or toxic-appearing.   HENT:      Head: Normocephalic.      Right Ear: Hearing, ear canal and external ear normal. Tympanic membrane is erythematous and retracted.      Left Ear: Hearing, ear canal and external ear normal. Tympanic membrane is erythematous.      Nose: Congestion present.      Right Sinus: Maxillary sinus tenderness present.      Left Sinus: Maxillary sinus tenderness present.      Mouth/Throat:      Lips: Pink.      Pharynx: Oropharynx is clear.   Cardiovascular:      Rate and Rhythm: Normal rate and regular rhythm.      Heart sounds: Normal heart sounds, S1 normal and S2 normal.   Pulmonary:      Effort: Pulmonary effort is normal.      Breath sounds: Normal breath sounds. No decreased breath sounds, wheezing, rhonchi or rales.   Skin:     General: Skin " is warm and moist.   Neurological:      General: No focal deficit present.      Mental Status: She is alert, oriented to person, place, and time and easily aroused.   Psychiatric:         Behavior: Behavior is cooperative.

## 2024-05-31 NOTE — PATIENT INSTRUCTIONS
Augmentin twice a day for 10 days  Flonase 1 spray each nostril daily.  Saline nasal spray as directed to rinse sinus passages.  Increase your fluid intake.  Tylenol and/or Motrin as needed for pain or fever.  Follow up with your PCP for worsening or concerning symptoms

## 2024-06-06 ENCOUNTER — ANESTHESIA EVENT (OUTPATIENT)
Dept: ANESTHESIOLOGY | Facility: HOSPITAL | Age: 39
End: 2024-06-06

## 2024-06-06 ENCOUNTER — ANESTHESIA (OUTPATIENT)
Dept: ANESTHESIOLOGY | Facility: HOSPITAL | Age: 39
End: 2024-06-06

## 2024-06-07 ENCOUNTER — HOSPITAL ENCOUNTER (OUTPATIENT)
Dept: MAMMOGRAPHY | Facility: HOSPITAL | Age: 39
Discharge: HOME | End: 2024-06-07
Attending: OBSTETRICS & GYNECOLOGY
Payer: COMMERCIAL

## 2024-06-07 ENCOUNTER — OFFICE VISIT (OUTPATIENT)
Dept: OBGYN CLINIC | Facility: CLINIC | Age: 39
End: 2024-06-07
Payer: COMMERCIAL

## 2024-06-07 VITALS — HEIGHT: 63 IN | BODY MASS INDEX: 29.59 KG/M2 | WEIGHT: 167 LBS

## 2024-06-07 VITALS
DIASTOLIC BLOOD PRESSURE: 82 MMHG | HEIGHT: 63 IN | BODY MASS INDEX: 29.59 KG/M2 | WEIGHT: 167 LBS | SYSTOLIC BLOOD PRESSURE: 122 MMHG

## 2024-06-07 DIAGNOSIS — N92.0 MENORRHAGIA WITH REGULAR CYCLE: Primary | ICD-10-CM

## 2024-06-07 DIAGNOSIS — R10.2 PELVIC PAIN: ICD-10-CM

## 2024-06-07 DIAGNOSIS — Z12.31 ENCOUNTER FOR SCREENING MAMMOGRAM FOR MALIGNANT NEOPLASM OF BREAST: ICD-10-CM

## 2024-06-07 DIAGNOSIS — N94.6 DYSMENORRHEA: ICD-10-CM

## 2024-06-07 PROCEDURE — 77063 BREAST TOMOSYNTHESIS BI: CPT

## 2024-06-07 PROCEDURE — 77067 SCR MAMMO BI INCL CAD: CPT

## 2024-06-07 PROCEDURE — 99214 OFFICE O/P EST MOD 30 MIN: CPT | Performed by: OBSTETRICS & GYNECOLOGY

## 2024-06-07 NOTE — PROGRESS NOTES
Assessment/Plan:   Diagnoses and all orders for this visit:    Menorrhagia with regular cycle    Dysmenorrhea    Pelvic pain        We discussed surgical management for her heavy menstrual bleeding as well as her significant dysmenorrhea pelvic pain that is occurring during her menses.  We reviewed recommendation for laparoscopic approach for total hysterectomy due to her significant pelvic pain concentrated mainly to the RLQ and ability to have a more diagnostic visualization with laparoscopic approach.  We reviewed utilization of robotic assistance for total laparoscopic hysterectomy.  Will plan for total laparoscopic hysterectomy, bilateral salpingectomy, and cystoscopy.  We discussed ideally keeping bilateral ovaries if normal-appearing to allow for continued benefit of estrogen production for bone health and cardiac health prior to menopause.     We reviewed that this surgical management would definitively treat her heavy menstrual bleeding but that I cannot guarantee complete resolution of her pelvic pain with surgery.  We discussed that if her pelvic pain persist ovulation suppression with utilization of hormone medication should also be considered.    Patient is agreeable to plan.  We discussed that hysterectomy would definitively end all options for future fertility. Patient expresses understanding.     Will commence with surgical planning.     I have spent a total time of 30 minutes on 06/07/24 in caring for this patient including Diagnostic results, Risks and benefits of tx options, Instructions for management, Patient and family education, Impressions, Counseling / Coordination of care, Documenting in the medical record, Reviewing / ordering tests, medicine, procedures  , Obtaining or reviewing history  , and Communicating with other healthcare professionals .      Subjective:    Patient ID: Michaela Ware is a 39 y.o. female.  Chief Complaint   Patient presents with    Consult     Surgery  "consult-hysterectomy       Michaela presents today for further discussion of surgical management for heavy menstrual bleeding, pelvic pain, and dysmenorrhea.  Patient has described her pelvic pain in the past and she experiences severe pelvic pain a week prior to her menses, during her menses, and then for a few days afterwards.  She has approximately 1 week during the month that she is not without pain or discomfort.  She describes her menses as heavy with passage of multiple clots the last for approximately 5 to 7 days.  She describes her pain as concentrated within the lower right lower quadrant which also causes suprapubic pressure and urinary urgency.  She also experiences abdominal bloating.    She has in the past declined surgical management due to desire for future fertility however after recent evaluation by Dr. Ramez HOANG, she and her  have decided that they will no longer be pursuing future fertility.         The following portions of the patient's history were reviewed and updated as appropriate: allergies, current medications, past family history, past medical history, past social history, past surgical history, and problem list.    Review of Systems   Constitutional:  Negative for chills, diaphoresis and fever.   Respiratory:  Negative for shortness of breath.    Cardiovascular:  Negative for chest pain.   Gastrointestinal:  Negative for abdominal pain, constipation, diarrhea, nausea and vomiting.   Genitourinary:  Positive for frequency, menstrual problem and pelvic pain.   Musculoskeletal:  Positive for back pain.   Neurological:  Negative for dizziness, weakness and light-headedness.         Objective:  /82 (BP Location: Right arm, Patient Position: Sitting, Cuff Size: Large)   Ht 5' 3\" (1.6 m)   Wt 75.8 kg (167 lb)   LMP 05/30/2024 (Approximate)   BMI 29.58 kg/m²   Physical Exam  Constitutional:       General: She is not in acute distress.     Appearance: Normal appearance. She is not " diaphoretic.   HENT:      Head: Normocephalic and atraumatic.   Pulmonary:      Effort: Pulmonary effort is normal. No respiratory distress.   Musculoskeletal:      Right lower leg: No edema.      Left lower leg: No edema.   Neurological:      Mental Status: She is alert and oriented to person, place, and time.   Skin:     General: Skin is warm and dry.      Coloration: Skin is not pale.   Psychiatric:         Mood and Affect: Mood normal.         Behavior: Behavior normal.         Thought Content: Thought content normal.         Judgment: Judgment normal.   Vitals and nursing note reviewed.

## 2024-06-20 ENCOUNTER — ANESTHESIA (OUTPATIENT)
Dept: GASTROENTEROLOGY | Facility: AMBULARY SURGERY CENTER | Age: 39
End: 2024-06-20

## 2024-06-20 ENCOUNTER — HOSPITAL ENCOUNTER (OUTPATIENT)
Dept: GASTROENTEROLOGY | Facility: AMBULARY SURGERY CENTER | Age: 39
Setting detail: OUTPATIENT SURGERY
End: 2024-06-20
Payer: COMMERCIAL

## 2024-06-20 ENCOUNTER — ANESTHESIA EVENT (OUTPATIENT)
Dept: GASTROENTEROLOGY | Facility: AMBULARY SURGERY CENTER | Age: 39
End: 2024-06-20

## 2024-06-20 VITALS
HEART RATE: 75 BPM | OXYGEN SATURATION: 100 % | RESPIRATION RATE: 20 BRPM | SYSTOLIC BLOOD PRESSURE: 114 MMHG | WEIGHT: 165 LBS | HEIGHT: 62 IN | BODY MASS INDEX: 30.36 KG/M2 | TEMPERATURE: 96.9 F | DIASTOLIC BLOOD PRESSURE: 74 MMHG

## 2024-06-20 DIAGNOSIS — K31.A0 INTESTINAL METAPLASIA OF STOMACH: ICD-10-CM

## 2024-06-20 LAB
EXT PREGNANCY TEST URINE: NEGATIVE
EXT. CONTROL: NORMAL

## 2024-06-20 PROCEDURE — 88312 SPECIAL STAINS GROUP 1: CPT | Performed by: PATHOLOGY

## 2024-06-20 PROCEDURE — 81025 URINE PREGNANCY TEST: CPT | Performed by: ANESTHESIOLOGY

## 2024-06-20 PROCEDURE — 43239 EGD BIOPSY SINGLE/MULTIPLE: CPT | Performed by: INTERNAL MEDICINE

## 2024-06-20 PROCEDURE — 88305 TISSUE EXAM BY PATHOLOGIST: CPT | Performed by: PATHOLOGY

## 2024-06-20 RX ORDER — LIDOCAINE HYDROCHLORIDE 10 MG/ML
INJECTION, SOLUTION EPIDURAL; INFILTRATION; INTRACAUDAL; PERINEURAL AS NEEDED
Status: DISCONTINUED | OUTPATIENT
Start: 2024-06-20 | End: 2024-06-20

## 2024-06-20 RX ORDER — SODIUM CHLORIDE, SODIUM LACTATE, POTASSIUM CHLORIDE, CALCIUM CHLORIDE 600; 310; 30; 20 MG/100ML; MG/100ML; MG/100ML; MG/100ML
125 INJECTION, SOLUTION INTRAVENOUS CONTINUOUS
Status: DISCONTINUED | OUTPATIENT
Start: 2024-06-20 | End: 2024-06-24 | Stop reason: HOSPADM

## 2024-06-20 RX ORDER — PROPOFOL 10 MG/ML
INJECTION, EMULSION INTRAVENOUS AS NEEDED
Status: DISCONTINUED | OUTPATIENT
Start: 2024-06-20 | End: 2024-06-20

## 2024-06-20 RX ADMIN — SODIUM CHLORIDE, SODIUM LACTATE, POTASSIUM CHLORIDE, AND CALCIUM CHLORIDE: .6; .31; .03; .02 INJECTION, SOLUTION INTRAVENOUS at 10:39

## 2024-06-20 RX ADMIN — PROPOFOL 150 MG: 10 INJECTION, EMULSION INTRAVENOUS at 10:58

## 2024-06-20 RX ADMIN — PROPOFOL 50 MG: 10 INJECTION, EMULSION INTRAVENOUS at 11:02

## 2024-06-20 RX ADMIN — LIDOCAINE HYDROCHLORIDE 50 MG: 10 INJECTION, SOLUTION EPIDURAL; INFILTRATION; INTRACAUDAL at 10:58

## 2024-06-20 NOTE — ANESTHESIA PREPROCEDURE EVALUATION
Procedure:  EGD    Relevant Problems   ENDO   (+) Acquired hypothyroidism   (+) Hypothyroidism due to Hashimoto's thyroiditis      HEMATOLOGY   (+) Iron (Fe) deficiency anemia      NEURO/PSYCH   (+) Mild depression      Former smoker  Physical Exam    Airway    Mallampati score: II  TM Distance: >3 FB  Neck ROM: full     Dental   Comment: Denies loose teeth     Cardiovascular  Cardiovascular exam normal    Pulmonary  Pulmonary exam normal     Other Findings  Portions of exam deferred due to low yield and/or unknown COVID statuspost-pubertal.      Anesthesia Plan  ASA Score- 2     Anesthesia Type- IV sedation with anesthesia with ASA Monitors.         Additional Monitors:     Airway Plan:            Plan Factors-Exercise tolerance (METS): >4 METS.    Chart reviewed.   Existing labs reviewed. Patient summary reviewed.    Patient is not a current smoker.              Induction- intravenous.    Postoperative Plan-         Informed Consent- Anesthetic plan and risks discussed with patient.  I personally reviewed this patient with the CRNA. Discussed and agreed on the Anesthesia Plan with the CRNA..

## 2024-06-20 NOTE — ANESTHESIA POSTPROCEDURE EVALUATION
Post-Op Assessment Note    CV Status:  Stable  Pain Score: 0    Pain management: adequate       Mental Status:  Alert and awake   Hydration Status:  Euvolemic   PONV Controlled:  Controlled   Airway Patency:  Patent  Two or more mitigation strategies used for obstructive sleep apnea   Post Op Vitals Reviewed: Yes    No anethesia notable event occurred.    Staff: CRNA               BP   114/74   Temp   96.9   Pulse  85   Resp   14   SpO2   97%

## 2024-06-20 NOTE — H&P
History and Physical -  [facility]  Michaela Ware 39 y.o. female MRN: [id number]                  HPI: Michaela Ware is a 39 y.o. year old female who presents for EGD      REVIEW OF SYSTEMS: Per the HPI, and otherwise unremarkable.    Historical Information   Past Medical History:   Diagnosis Date    Allergic     Anemia 08/2023    Arthritis     B12 deficiency     Disease of thyroid gland     Fibroid     GERD (gastroesophageal reflux disease)     Hashimoto's disease     HL (hearing loss)     Hypertension     Hypothyroidism     Otitis media     Sjogren's disease (HCC)     Varicella      Past Surgical History:   Procedure Laterality Date    BARIATRIC SURGERY  abdominal lipo    12/2017    COLONOSCOPY      EGD AND COLONOSCOPY N/A 03/25/2016    Procedure: EGD AND COLONOSCOPY;  Surgeon: Aly Abreu MD;  Location: [facility];  Service:     REDUCTION MAMMAPLASTY Bilateral     16 years old     Social History   Social History     Substance and Sexual Activity   Alcohol Use Yes    Comment: social, 3 per month     Social History     Substance and Sexual Activity   Drug Use No     Social History     Tobacco Use   Smoking Status Former    Types: Cigarettes    Start date: 6/18/2019   Smokeless Tobacco Never     Family History   Problem Relation Age of Onset    Arthritis Mother     Heart attack Father     Heart disease Father     Hypertension Father     Thyroid disease Father     Arthritis Father     Autoimmune disease Father     Dementia Maternal Grandmother     Diabetes Maternal Grandmother     Asthma Maternal Grandmother     Arthritis Maternal Grandmother     Lymphoma Maternal Grandfather     Cancer Maternal Grandfather     Alzheimer's disease Paternal Grandmother     Arthritis Paternal Grandmother     Deep vein thrombosis Paternal Grandfather     Heart attack Paternal Grandfather     Hashimoto's thyroiditis Brother     No Known Problems Brother     Thyroid disease Brother     Autoimmune disease Brother      "Stroke Paternal Aunt     Stroke Paternal Aunt     Autoimmune disease Paternal Aunt     Cancer Paternal Uncle        Meds/Allergies       Current Outpatient Medications:     buPROPion (WELLBUTRIN XL) 150 mg 24 hr tablet    Synthroid 75 MCG tablet    cyanocobalamin (VITAMIN B-12) 1000 MCG tablet    Ferrous Sulfate (IRON PO)    metroNIDAZOLE (METROCREAM) 0.75 % cream    Current Facility-Administered Medications:     lactated ringers infusion, 125 mL/hr, Intravenous, Continuous    Allergies   Allergen Reactions    Peanut Oil - Food Allergy Cough and Other (See Comments)     oral    Peppermint Oil - Food Allergy Cough, Hives, Itching, Other (See Comments), Rash and Swelling     Sneezing.         Objective     /91   Pulse 83   Temp (!) 97.4 °F (36.3 °C) (Temporal)   Resp 16   Ht 5' 2\" (1.575 m)   Wt 74.8 kg (165 lb)   LMP 05/30/2024 (Approximate)   SpO2 99%   BMI 30.18 kg/m²       PHYSICAL EXAM    Gen: NAD  Head: NCAT  CV: RRR  CHEST: Clear  ABD: soft, NT/ND  EXT: no edema      ASSESSMENT/PLAN:  This is a 39 y.o. year old female here for EGD, and she is stable and optimized for her procedure.       "

## 2024-06-21 ENCOUNTER — PREP FOR PROCEDURE (OUTPATIENT)
Dept: OBGYN CLINIC | Facility: CLINIC | Age: 39
End: 2024-06-21

## 2024-06-21 ENCOUNTER — TELEPHONE (OUTPATIENT)
Dept: OBGYN CLINIC | Facility: CLINIC | Age: 39
End: 2024-06-21

## 2024-06-21 DIAGNOSIS — Z01.812 ENCOUNTER FOR PRE-OPERATIVE LABORATORY TESTING: Primary | ICD-10-CM

## 2024-06-21 DIAGNOSIS — N94.6 DYSMENORRHEA: ICD-10-CM

## 2024-06-21 DIAGNOSIS — D21.9 FIBROIDS: ICD-10-CM

## 2024-06-21 DIAGNOSIS — N92.0 MENORRHAGIA WITH REGULAR CYCLE: ICD-10-CM

## 2024-06-21 DIAGNOSIS — R10.2 PELVIC PAIN: ICD-10-CM

## 2024-06-21 NOTE — TELEPHONE ENCOUNTER
Called pt, no answer.   Left VM for pt to return my call at my direct number (#[phone]) to discuss scheduling surgery.

## 2024-06-21 NOTE — TELEPHONE ENCOUNTER
----- Message from Jaja Cardona MD sent at 2024  4:50 PM EDT -----  [facility] GYN Department  Surgery Scheduling Sheet    Patient Name: Michaela Ware  : 1985    Provider: Jaja Wiley-Magdalene Cardona MD     Needed: no; Language: N/A    Procedure: exam under anesthesia, robotic assisted total laparoscopic hysterectomy, cystoscopy, bilateral salpingectomy, and all other indicated procedures    Diagnosis: Heavy menstrual bleeding, fibroid uterus, dysmenorrhea, pelvic pain    Special Needs or Equipment: none    Anesthesia: General anesthesia    Length of stay: outpatient  -Does patient have comorbid conditions that will require close perioperative monitoring prior to safe discharge: no    -The patient has comorbid conditions that will require close perioperative monitoring prior to safe discharge, including N/A.   -This may require acute care beyond the usual and routine recovery period. As such, inpatient admission post-operatively is expected and appropriate, and anticipated hospital length of stay will be >2 midnights.    Pre-Admission Testing Needed: yes   Labs that should be ordered: cbc, hgb A1C, type and screen, and cmp    Order PAT that is recommended in prep for procedure?: Yes    Medical Clearance Needed: no; Provider: N/A    MA Form Signed (tubals/hysterectomy): Not Indicated    Surgical Drink Given: no - will give at preoperative visit    How many days out of work: 6 week(s)     How many days no drivin week(s)       Is pre op appt needed?  yes  Interval for post op appt: 2 and 6 week(s)     For Surgical Scheduler:     Surgery Scheduled On:  [place]: [facility] and [facility]    Pre-op Appt:   Post op Appt:  Consult/Medical clearance appt: N/A

## 2024-06-26 PROCEDURE — 88305 TISSUE EXAM BY PATHOLOGIST: CPT | Performed by: PATHOLOGY

## 2024-06-26 PROCEDURE — 88312 SPECIAL STAINS GROUP 1: CPT | Performed by: PATHOLOGY

## 2024-06-26 RX ORDER — ACETAMINOPHEN 325 MG/1
975 TABLET ORAL ONCE
OUTPATIENT
Start: 2024-06-26 | End: 2024-06-26

## 2024-06-26 RX ORDER — CELECOXIB 100 MG/1
200 CAPSULE ORAL ONCE
OUTPATIENT
Start: 2024-06-26 | End: 2024-06-26

## 2024-06-26 RX ORDER — PHENAZOPYRIDINE HYDROCHLORIDE 100 MG/1
100 TABLET, FILM COATED ORAL ONCE
OUTPATIENT
Start: 2024-06-26 | End: 2024-06-26

## 2024-06-26 RX ORDER — GABAPENTIN 100 MG/1
200 CAPSULE ORAL ONCE
OUTPATIENT
Start: 2024-06-26 | End: 2024-06-26

## 2024-09-11 DIAGNOSIS — E06.3 HYPOTHYROIDISM DUE TO HASHIMOTO'S THYROIDITIS: ICD-10-CM

## 2024-09-11 DIAGNOSIS — E03.8 HYPOTHYROIDISM DUE TO HASHIMOTO'S THYROIDITIS: ICD-10-CM

## 2024-09-11 RX ORDER — LEVOTHYROXINE SODIUM 75 MCG
TABLET ORAL
Qty: 90 TABLET | Refills: 1 | Status: SHIPPED | OUTPATIENT
Start: 2024-09-11

## 2024-09-20 ENCOUNTER — APPOINTMENT (OUTPATIENT)
Dept: LAB | Facility: CLINIC | Age: 39
End: 2024-09-20
Payer: COMMERCIAL

## 2024-09-20 ENCOUNTER — TELEPHONE (OUTPATIENT)
Age: 39
End: 2024-09-20

## 2024-09-20 DIAGNOSIS — E03.9 ACQUIRED HYPOTHYROIDISM: Primary | ICD-10-CM

## 2024-09-20 DIAGNOSIS — D50.8 OTHER IRON DEFICIENCY ANEMIA: ICD-10-CM

## 2024-09-20 DIAGNOSIS — E55.9 VITAMIN D DEFICIENCY: ICD-10-CM

## 2024-09-20 DIAGNOSIS — E06.3 HYPOTHYROIDISM DUE TO HASHIMOTO'S THYROIDITIS: ICD-10-CM

## 2024-09-20 LAB
25(OH)D3 SERPL-MCNC: 27 NG/ML (ref 30–100)
ALBUMIN SERPL BCG-MCNC: 4.1 G/DL (ref 3.5–5)
ALP SERPL-CCNC: 63 U/L (ref 34–104)
ALT SERPL W P-5'-P-CCNC: 14 U/L (ref 7–52)
ANION GAP SERPL CALCULATED.3IONS-SCNC: 5 MMOL/L (ref 4–13)
AST SERPL W P-5'-P-CCNC: 12 U/L (ref 13–39)
BASOPHILS # BLD AUTO: 0.05 THOUSANDS/ΜL (ref 0–0.1)
BASOPHILS NFR BLD AUTO: 1 % (ref 0–1)
BILIRUB SERPL-MCNC: 0.24 MG/DL (ref 0.2–1)
BUN SERPL-MCNC: 10 MG/DL (ref 5–25)
CALCIUM SERPL-MCNC: 8.8 MG/DL (ref 8.4–10.2)
CHLORIDE SERPL-SCNC: 106 MMOL/L (ref 96–108)
CO2 SERPL-SCNC: 26 MMOL/L (ref 21–32)
CREAT SERPL-MCNC: 0.83 MG/DL (ref 0.6–1.3)
EOSINOPHIL # BLD AUTO: 0.18 THOUSAND/ΜL (ref 0–0.61)
EOSINOPHIL NFR BLD AUTO: 3 % (ref 0–6)
ERYTHROCYTE [DISTWIDTH] IN BLOOD BY AUTOMATED COUNT: 13.9 % (ref 11.6–15.1)
FERRITIN SERPL-MCNC: 9 NG/ML (ref 11–307)
GFR SERPL CREATININE-BSD FRML MDRD: 88 ML/MIN/1.73SQ M
GLUCOSE P FAST SERPL-MCNC: 101 MG/DL (ref 65–99)
HCT VFR BLD AUTO: 37.4 % (ref 34.8–46.1)
HGB BLD-MCNC: 12.1 G/DL (ref 11.5–15.4)
IMM GRANULOCYTES # BLD AUTO: 0.02 THOUSAND/UL (ref 0–0.2)
IMM GRANULOCYTES NFR BLD AUTO: 0 % (ref 0–2)
IRON SATN MFR SERPL: 10 % (ref 15–50)
IRON SERPL-MCNC: 37 UG/DL (ref 50–212)
LYMPHOCYTES # BLD AUTO: 1.98 THOUSANDS/ΜL (ref 0.6–4.47)
LYMPHOCYTES NFR BLD AUTO: 33 % (ref 14–44)
MCH RBC QN AUTO: 28.6 PG (ref 26.8–34.3)
MCHC RBC AUTO-ENTMCNC: 32.4 G/DL (ref 31.4–37.4)
MCV RBC AUTO: 88 FL (ref 82–98)
MONOCYTES # BLD AUTO: 0.36 THOUSAND/ΜL (ref 0.17–1.22)
MONOCYTES NFR BLD AUTO: 6 % (ref 4–12)
NEUTROPHILS # BLD AUTO: 3.41 THOUSANDS/ΜL (ref 1.85–7.62)
NEUTS SEG NFR BLD AUTO: 57 % (ref 43–75)
NRBC BLD AUTO-RTO: 0 /100 WBCS
PLATELET # BLD AUTO: 297 THOUSANDS/UL (ref 149–390)
PMV BLD AUTO: 10.9 FL (ref 8.9–12.7)
POTASSIUM SERPL-SCNC: 4 MMOL/L (ref 3.5–5.3)
PROT SERPL-MCNC: 7.1 G/DL (ref 6.4–8.4)
RBC # BLD AUTO: 4.23 MILLION/UL (ref 3.81–5.12)
SODIUM SERPL-SCNC: 137 MMOL/L (ref 135–147)
T4 FREE SERPL-MCNC: 0.77 NG/DL (ref 0.61–1.12)
TIBC SERPL-MCNC: 362 UG/DL (ref 250–450)
TSH SERPL DL<=0.05 MIU/L-ACNC: 3.42 UIU/ML (ref 0.45–4.5)
UIBC SERPL-MCNC: 325 UG/DL (ref 155–355)
WBC # BLD AUTO: 6 THOUSAND/UL (ref 4.31–10.16)

## 2024-09-20 PROCEDURE — 82728 ASSAY OF FERRITIN: CPT

## 2024-09-20 PROCEDURE — 84443 ASSAY THYROID STIM HORMONE: CPT

## 2024-09-20 PROCEDURE — 83540 ASSAY OF IRON: CPT

## 2024-09-20 PROCEDURE — 84439 ASSAY OF FREE THYROXINE: CPT

## 2024-09-20 PROCEDURE — 82306 VITAMIN D 25 HYDROXY: CPT

## 2024-09-20 PROCEDURE — 36415 COLL VENOUS BLD VENIPUNCTURE: CPT

## 2024-09-20 PROCEDURE — 83550 IRON BINDING TEST: CPT

## 2024-09-20 NOTE — TELEPHONE ENCOUNTER
Pt would like PCP to add Lipid Panel to her lab orders. Please notify pt when lab order is in via [employer].

## 2024-09-23 ENCOUNTER — APPOINTMENT (OUTPATIENT)
Dept: LAB | Facility: CLINIC | Age: 39
End: 2024-09-23
Payer: COMMERCIAL

## 2024-09-23 ENCOUNTER — OFFICE VISIT (OUTPATIENT)
Dept: ENDOCRINOLOGY | Facility: CLINIC | Age: 39
End: 2024-09-23
Payer: COMMERCIAL

## 2024-09-23 VITALS
HEART RATE: 64 BPM | HEIGHT: 62 IN | DIASTOLIC BLOOD PRESSURE: 80 MMHG | BODY MASS INDEX: 30.55 KG/M2 | WEIGHT: 166 LBS | SYSTOLIC BLOOD PRESSURE: 120 MMHG | OXYGEN SATURATION: 99 %

## 2024-09-23 DIAGNOSIS — E03.9 ACQUIRED HYPOTHYROIDISM: ICD-10-CM

## 2024-09-23 DIAGNOSIS — E03.8 HYPOTHYROIDISM DUE TO HASHIMOTO'S THYROIDITIS: Primary | ICD-10-CM

## 2024-09-23 DIAGNOSIS — E06.3 HYPOTHYROIDISM DUE TO HASHIMOTO'S THYROIDITIS: Primary | ICD-10-CM

## 2024-09-23 DIAGNOSIS — R73.01 IFG (IMPAIRED FASTING GLUCOSE): ICD-10-CM

## 2024-09-23 DIAGNOSIS — E55.9 VITAMIN D DEFICIENCY: ICD-10-CM

## 2024-09-23 LAB
CHOLEST SERPL-MCNC: 190 MG/DL
HDLC SERPL-MCNC: 46 MG/DL
LDLC SERPL CALC-MCNC: 120 MG/DL (ref 0–100)
TRIGL SERPL-MCNC: 119 MG/DL

## 2024-09-23 PROCEDURE — 99214 OFFICE O/P EST MOD 30 MIN: CPT | Performed by: PHYSICIAN ASSISTANT

## 2024-09-23 PROCEDURE — 80061 LIPID PANEL: CPT

## 2024-09-23 PROCEDURE — 36415 COLL VENOUS BLD VENIPUNCTURE: CPT

## 2024-09-23 RX ORDER — ESCITALOPRAM OXALATE 5 MG/1
5 TABLET ORAL DAILY
COMMUNITY

## 2024-09-23 RX ORDER — ACETAMINOPHEN 160 MG
2000 TABLET,DISINTEGRATING ORAL DAILY
Start: 2024-09-23

## 2024-09-23 NOTE — PROGRESS NOTES
Patient Progress Note    CC: hypothyroidism     Referring Provider  Aleah Mcdonald Md  [address] [facility]  [address]  [place] [address]     History of Present Illness:     Patient is a 39-year-old female here for follow-up of hypothyroidism secondary to Hashimoto's thyroiditis. Does have a medical history of Sjogren's syndrome and infertility. Patient is on Synthroid 75 mcg 1 tablet daily. Patient is taking it 1 hr before breakfast on an empty stomach and at least 4 hrs apart from supplements. Tolerating medication well. No history of external radiation to head/neck/chest. No family history of thyroid cancer. No recent Iodine loading in form of medication, biotin or kelp supplements or radiological diagnostic studies. Most recent TSH normal at 3.418 and free T4 normal at 0.77.     She is trying to eat healthy and exercising at least 3 days a week.      Vitamin D: she has not been taking vitamin D3 supplement. Vitamin D is low at 27. She has some 5000 IU capsules at home of vitamin D3.         Patient Active Problem List   Diagnosis    Acquired hypothyroidism    Hypothyroidism due to Hashimoto's thyroiditis    Vitamin D deficiency    Iron (Fe) deficiency anemia    Mild depression    Tinnitus of right ear     Past Medical History:   Diagnosis Date    Allergic     Anemia 08/2023    Arthritis     B12 deficiency     Disease of thyroid gland     Fibroid     GERD (gastroesophageal reflux disease)     Hashimoto's disease     HL (hearing loss)     Hypertension     Hypothyroidism     Otitis media     Sjogren's disease (HCC)     Varicella       Past Surgical History:   Procedure Laterality Date    BARIATRIC SURGERY  abdominal lipo    12/2017    COLONOSCOPY      EGD AND COLONOSCOPY N/A 03/25/2016    Procedure: EGD AND COLONOSCOPY;  Surgeon: Aly Abreu MD;  Location: [facility];  Service:     REDUCTION MAMMAPLASTY Bilateral     16 years old      Family History   Problem Relation Age of Onset    Arthritis Mother     Heart  attack Father     Heart disease Father     Hypertension Father     Thyroid disease Father     Arthritis Father     Autoimmune disease Father     Dementia Maternal Grandmother     Diabetes Maternal Grandmother     Asthma Maternal Grandmother     Arthritis Maternal Grandmother     Lymphoma Maternal Grandfather     Cancer Maternal Grandfather     Alzheimer's disease Paternal Grandmother     Arthritis Paternal Grandmother     Deep vein thrombosis Paternal Grandfather     Heart attack Paternal Grandfather     Hashimoto's thyroiditis Brother     No Known Problems Brother     Thyroid disease Brother     Autoimmune disease Brother     Stroke Paternal Aunt     Stroke Paternal Aunt     Autoimmune disease Paternal Aunt     Cancer Paternal Uncle      Social History     Tobacco Use    Smoking status: Former     Types: Cigarettes     Start date: 6/18/2019    Smokeless tobacco: Never   Substance Use Topics    Alcohol use: Yes     Comment: social, 3 per month     Allergies   Allergen Reactions    Peanut Oil - Food Allergy Cough and Other (See Comments)     oral    Peppermint Oil - Food Allergy Cough, Hives, Itching, Other (See Comments), Rash and Swelling     Sneezing.       Current Outpatient Medications   Medication Sig Dispense Refill    Cholecalciferol (Vitamin D3) 50 MCG (2000 UT) capsule Take 1 capsule (2,000 Units total) by mouth daily      cyanocobalamin (VITAMIN B-12) 1000 MCG tablet Take 2,000 mcg by mouth daily      escitalopram (LEXAPRO) 5 mg tablet Take 5 mg by mouth daily      Ferrous Sulfate (IRON PO) Take by mouth      metroNIDAZOLE (METROCREAM) 0.75 % cream Apply topically 2 (two) times a day Apply 1-2x/day to face for rosacea 45 g 3    Synthroid 75 MCG tablet TAKE ONE TABLET BY MOUTH EVERY DAY IN THE EARLY MORNING 90 tablet 1    buPROPion (WELLBUTRIN XL) 150 mg 24 hr tablet        No current facility-administered medications for this visit.         Review of Systems   Constitutional:  Positive for fatigue (vitamin  "D and iron is low). Negative for activity change, appetite change and unexpected weight change.   HENT:  Positive for trouble swallowing (occasional, sees GI).    Eyes:  Positive for visual disturbance (occasionally sees halos if staring at a screen).   Respiratory:  Negative for shortness of breath.    Cardiovascular:  Negative for chest pain and palpitations.   Gastrointestinal:  Negative for constipation and diarrhea.   Endocrine: Negative for cold intolerance and heat intolerance.   Musculoskeletal: Negative.    Skin:         Dry skin   Neurological:  Negative for tremors.   Psychiatric/Behavioral:  The patient is nervous/anxious (stable with Lexapro).        Physical Exam:  Body mass index is 30.36 kg/m².  /80 (BP Location: Left arm, Patient Position: Sitting, Cuff Size: Large)   Pulse 64   Ht 5' 2\" (1.575 m)   Wt 75.3 kg (166 lb)   SpO2 99%   BMI 30.36 kg/m²    Wt Readings from Last 3 Encounters:   09/23/24 75.3 kg (166 lb)   06/20/24 74.8 kg (165 lb)   06/13/24 75.8 kg (167 lb)       Physical Exam  Vitals and nursing note reviewed.   Constitutional:       Appearance: She is well-developed.   HENT:      Head: Normocephalic.   Eyes:      General: No scleral icterus.     Extraocular Movements: EOM normal.   Neck:      Thyroid: No thyromegaly.   Cardiovascular:      Rate and Rhythm: Normal rate and regular rhythm.      Pulses:           Radial pulses are 2+ on the right side and 2+ on the left side.      Heart sounds: No murmur heard.  Pulmonary:      Effort: Pulmonary effort is normal. No respiratory distress.      Breath sounds: Normal breath sounds. No wheezing.   Musculoskeletal:      Cervical back: Neck supple.   Skin:     General: Skin is warm and dry.   Neurological:      Mental Status: She is alert.      Deep Tendon Reflexes: Reflexes are normal and symmetric.   Psychiatric:         Mood and Affect: Mood and affect normal.           Labs:   No results found for: \"HGBA1C\"    Lab Results "   Component Value Date    HDL 64 (H) 02/17/2016    TRIG 137 02/17/2016       Lab Results   Component Value Date    GLUCOSE 101 05/07/2015    CALCIUM 8.8 09/20/2024     05/07/2015    K 4.0 09/20/2024    CO2 26 09/20/2024     09/20/2024    BUN 10 09/20/2024    CREATININE 0.83 09/20/2024        GFR, Calculated   Date Value Ref Range Status   08/25/2019 77 >60 mL/min/1.73m2 Final     Comment:     mL/min per 1.73 square meters                                            Normal Function or Mild Renal    Disease (if clinically at risk):  >or=60  Moderately Decreased:                30-59  Severely Decreased:                  15-29  Renal Failure:                         <15                                            -American GFR: multiply reported GFR by 1.16    Please note that the eGFR is based on the CKD-EPI calculation, and is not intended to be used for drug dosing.                                            Note: Calculated GFR may not be an accurate indicator of renal function if the patient's renal function is not in a steady state.     eGFR   Date Value Ref Range Status   09/20/2024 88 ml/min/1.73sq m Final       Lab Results   Component Value Date    ALT 14 09/20/2024    AST 12 (L) 09/20/2024    ALKPHOS 63 09/20/2024    BILITOT 0.2 05/07/2015       Lab Results   Component Value Date    DOO9FZTENIET 3.418 09/20/2024    TSH 0.33 (L) 08/25/2019    C1XYZJG 1.2 02/17/2016    C4MUCVI 9.8 02/17/2016         Plan:    Diagnoses and all orders for this visit:    Hypothyroidism due to Hashimoto's thyroiditis  TSH normal at 3.418 and free T4 normal at 0.77.  Continue current dose of Synthroid  Monitor labs  -     T4, free; Future  -     TSH, 3rd generation; Future    Vitamin D deficiency  Vitamin D low at 27  Advised to take 2000 IU daily  Monitor level  -     Cholecalciferol (Vitamin D3) 50 MCG (2000 UT) capsule; Take 1 capsule (2,000 Units total) by mouth daily  -     Vitamin D 25 hydroxy;  Future    IFG  Fasting glucose mildly elevated at 101  Recommended healthy diet and routine exercise as tolerated  She has an A1c ordered to do in about 2 months (prior to a procedure)        Discussed with the patient and all questions fully answered. She will call me if any problems arise.    Counseled patient on diagnostic results, prognosis, risk and benefit of treatment options, instruction for management, importance of treatment compliance, risk  factor reduction and impressions      Marisol Mullins PA-C

## 2024-10-02 ENCOUNTER — RA CDI HCC (OUTPATIENT)
Dept: OTHER | Facility: HOSPITAL | Age: 39
End: 2024-10-02

## 2024-10-02 NOTE — PROGRESS NOTES
HCC coding opportunities       Chart reviewed, no opportunity found: CHART REVIEWED, NO OPPORTUNITY FOUND        Patients Insurance        Commercial Insurance: [id number]

## 2024-10-09 ENCOUNTER — OFFICE VISIT (OUTPATIENT)
Dept: FAMILY MEDICINE CLINIC | Facility: CLINIC | Age: 39
End: 2024-10-09
Payer: COMMERCIAL

## 2024-10-09 VITALS
RESPIRATION RATE: 17 BRPM | DIASTOLIC BLOOD PRESSURE: 80 MMHG | HEART RATE: 86 BPM | HEIGHT: 62 IN | BODY MASS INDEX: 30 KG/M2 | TEMPERATURE: 97.1 F | WEIGHT: 163 LBS | OXYGEN SATURATION: 98 % | SYSTOLIC BLOOD PRESSURE: 120 MMHG

## 2024-10-09 DIAGNOSIS — E06.3 HYPOTHYROIDISM DUE TO HASHIMOTO'S THYROIDITIS: ICD-10-CM

## 2024-10-09 DIAGNOSIS — D50.8 OTHER IRON DEFICIENCY ANEMIA: ICD-10-CM

## 2024-10-09 DIAGNOSIS — F32.A MILD DEPRESSION: Primary | ICD-10-CM

## 2024-10-09 PROCEDURE — 99214 OFFICE O/P EST MOD 30 MIN: CPT | Performed by: FAMILY MEDICINE

## 2024-10-09 NOTE — ASSESSMENT & PLAN NOTE
Normal hemoglobin level  On iron supplement daily which makes her constipated  To take every other day with vitamin C    Orders:    Iron Panel (Includes Ferritin, Iron Sat%, Iron, and TIBC); Future

## 2024-10-09 NOTE — PROGRESS NOTES
Ambulatory Visit  Name: Michaela Ware      : 1985      MRN: [id number]  Encounter Provider: Aleah Mcdonald MD  Encounter Date: 10/9/2024   Encounter department: [facility]    Assessment & Plan  Mild depression  Off of all meds   Coping well overall  Seen by psych virtually 3 months ago and was given effexor and stopped after 3 weeks due to side effects ( agressiveness)  Tried lexapro after   Referral to get tested for ADHD  Orders:    Ambulatory Referral to Neuropsychology; Future    Other iron deficiency anemia  Normal hemoglobin level  On iron supplement daily which makes her constipated  To take every other day with vitamin C    Orders:    Iron Panel (Includes Ferritin, Iron Sat%, Iron, and TIBC); Future    Hypothyroidism due to Hashimoto's thyroiditis  Stable on current meds              History of Present Illness     HPI  Here for follow up  Feels well overall  Trouble falling asleep   Trouble focusing and staying on tasks at times      Review of Systems   Constitutional: Negative.    HENT: Negative.     Respiratory: Negative.     Cardiovascular: Negative.    Endocrine: Negative.    Genitourinary: Negative.    Musculoskeletal: Negative.    Hematological: Negative.    Psychiatric/Behavioral: Negative.       Past Medical History:   Diagnosis Date    Allergic     Anemia 2023    Arthritis     B12 deficiency     Disease of thyroid gland     Fibroid     GERD (gastroesophageal reflux disease)     Hashimoto's disease     HL (hearing loss)     Hypertension     Hypothyroidism     Otitis media     Sjogren's disease (HCC)     Varicella      Past Surgical History:   Procedure Laterality Date    BARIATRIC SURGERY  abdominal lipo    2017    COLONOSCOPY      EGD AND COLONOSCOPY N/A 2016    Procedure: EGD AND COLONOSCOPY;  Surgeon: Aly Abreu MD;  Location: [facility];  Service:     REDUCTION MAMMAPLASTY Bilateral     16 years old     Family History   Problem Relation Age of Onset     Arthritis Mother     Heart attack Father     Heart disease Father     Hypertension Father     Thyroid disease Father     Arthritis Father     Autoimmune disease Father     Dementia Maternal Grandmother     Diabetes Maternal Grandmother     Asthma Maternal Grandmother     Arthritis Maternal Grandmother     Lymphoma Maternal Grandfather     Cancer Maternal Grandfather     Alzheimer's disease Paternal Grandmother     Arthritis Paternal Grandmother     Deep vein thrombosis Paternal Grandfather     Heart attack Paternal Grandfather     Hashimoto's thyroiditis Brother     No Known Problems Brother     Thyroid disease Brother     Autoimmune disease Brother     Stroke Paternal Aunt     Stroke Paternal Aunt     Autoimmune disease Paternal Aunt     Cancer Paternal Uncle      Social History     Tobacco Use    Smoking status: Former     Types: Cigarettes     Start date: 6/18/2019     Passive exposure: Past    Smokeless tobacco: Never   Vaping Use    Vaping status: Never Used   Substance and Sexual Activity    Alcohol use: Yes     Comment: social, 3 per month    Drug use: No    Sexual activity: Yes     Partners: Male     Birth control/protection: None     Current Outpatient Medications on File Prior to Visit   Medication Sig    Cholecalciferol (Vitamin D3) 50 MCG (2000 UT) capsule Take 1 capsule (2,000 Units total) by mouth daily    cyanocobalamin (VITAMIN B-12) 1000 MCG tablet Take 2,000 mcg by mouth daily    Ferrous Sulfate (IRON PO) Take by mouth    metroNIDAZOLE (METROCREAM) 0.75 % cream Apply topically 2 (two) times a day Apply 1-2x/day to face for rosacea    Synthroid 75 MCG tablet TAKE ONE TABLET BY MOUTH EVERY DAY IN THE EARLY MORNING    [DISCONTINUED] buPROPion (WELLBUTRIN XL) 150 mg 24 hr tablet     [DISCONTINUED] escitalopram (LEXAPRO) 5 mg tablet Take 5 mg by mouth daily     Allergies   Allergen Reactions    Peanut Oil - Food Allergy Cough and Other (See Comments)     oral    Peppermint Oil - Food Allergy Cough,  "Hives, Itching, Other (See Comments), Rash and Swelling     Sneezing.       Immunization History   Administered Date(s) Administered    COVID-19 PFIZER VACCINE 0.3 ML IM 04/10/2021, 05/05/2021    Hep B, Adolescent or Pediatric 07/28/1998, 02/09/1999, 06/22/1999    Td (adult), adsorbed 08/13/2002     Objective     /80 (BP Location: Left arm, Patient Position: Sitting, Cuff Size: Standard)   Pulse 86   Temp (!) 97.1 °F (36.2 °C) (Tympanic)   Resp 17   Ht 5' 2\" (1.575 m)   Wt 73.9 kg (163 lb)   SpO2 98%   BMI 29.81 kg/m²     Physical Exam  Vitals and nursing note reviewed.   Constitutional:       Appearance: Normal appearance.   Cardiovascular:      Rate and Rhythm: Normal rate and regular rhythm.      Pulses: Normal pulses.      Heart sounds: Normal heart sounds.   Pulmonary:      Effort: Pulmonary effort is normal.      Breath sounds: Normal breath sounds.   Neurological:      General: No focal deficit present.      Mental Status: She is alert and oriented to person, place, and time.       Administrative Statements   I have spent a total time of 25 minutes in caring for this patient on the day of the visit/encounter including Risks and benefits of tx options, Instructions for management, Patient and family education, and Importance of tx compliance.  "

## 2024-10-09 NOTE — ASSESSMENT & PLAN NOTE
Off of all meds   Coping well overall  Seen by psych virtually 3 months ago and was given effexor and stopped after 3 weeks due to side effects ( agressiveness)  Tried lexapro after   Referral to get tested for ADHD  Orders:    Ambulatory Referral to Neuropsychology; Future

## 2024-11-22 ENCOUNTER — PROCEDURE VISIT (OUTPATIENT)
Dept: OBGYN CLINIC | Facility: CLINIC | Age: 39
End: 2024-11-22
Payer: COMMERCIAL

## 2024-11-22 VITALS
SYSTOLIC BLOOD PRESSURE: 122 MMHG | HEIGHT: 62 IN | BODY MASS INDEX: 30 KG/M2 | WEIGHT: 163 LBS | DIASTOLIC BLOOD PRESSURE: 82 MMHG

## 2024-11-22 DIAGNOSIS — R10.2 PELVIC PAIN: ICD-10-CM

## 2024-11-22 DIAGNOSIS — N92.0 MENORRHAGIA WITH REGULAR CYCLE: ICD-10-CM

## 2024-11-22 DIAGNOSIS — N94.6 DYSMENORRHEA: ICD-10-CM

## 2024-11-22 DIAGNOSIS — Z01.818 PREOPERATIVE EXAM FOR GYNECOLOGIC SURGERY: Primary | ICD-10-CM

## 2024-11-22 LAB — SL AMB POCT URINE HCG: NEGATIVE

## 2024-11-22 PROCEDURE — 99215 OFFICE O/P EST HI 40 MIN: CPT | Performed by: OBSTETRICS & GYNECOLOGY

## 2024-11-22 PROCEDURE — 81025 URINE PREGNANCY TEST: CPT | Performed by: OBSTETRICS & GYNECOLOGY

## 2024-11-22 PROCEDURE — 58100 BIOPSY OF UTERUS LINING: CPT | Performed by: OBSTETRICS & GYNECOLOGY

## 2024-11-22 PROCEDURE — 88305 TISSUE EXAM BY PATHOLOGIST: CPT | Performed by: PATHOLOGY

## 2024-11-22 RX ORDER — METHYLPHENIDATE HYDROCHLORIDE 18 MG/1
18 TABLET ORAL EVERY MORNING
COMMUNITY
Start: 2024-11-09

## 2024-11-22 NOTE — PROGRESS NOTES
Assessment/Plan:     Diagnoses and all orders for this visit:    Preoperative exam for gynecologic surgery  -     Endometrial biopsy  -     POCT urine HCG  -     Tissue Exam    Menorrhagia with regular cycle  -     Endometrial biopsy  -     POCT urine HCG  -     Tissue Exam    Pelvic pain  -     POCT urine HCG  -     Tissue Exam    Dysmenorrhea  -     POCT urine HCG  -     Tissue Exam    Other orders  -     methylphenidate (CONCERTA) 18 mg ER tablet; Take 18 mg by mouth every morning        We discussed the recommendation still for total laparoscopic hysterectomy with robotic assistance, bilateral salpingectomy, and cystoscopy for her scheduled procedure.  We have discussed in the past and again reiterated today that the procedure will definitely assist with her bleeding issues and her heavy menstrual bleeding that is occurring.  We discussed that I cannot guarantee complete resolution of her pelvic pain but the hope is that with the reduction in bleeding, we will reduce some of the inflammation in her pelvis related to her cycle and reduce her pelvic pain as well.  We discussed that if her ovaries are appearing abnormal we will address that during her surgery but the goal should be for her to leave with bilateral ovaries in place.  We discussed the removal of her ovaries at this time we will increase her risk for all cause mortality and increased risk for myocardial infarction secondary to early menopause via surgical menopause.  Patient expressed understanding.    We reviewed the recommendation for endometrial biopsy to rule out abnormal uterine tissue and uterine cancer prior to proceeding with surgical intervention.  We discussed that if endometrial biopsy is not performed she may carry a risk of approximately 4% of needing a secondary surgery performed if uterine cancer is found on pathology and that surgery should be performed by GYN oncologist.  We reviewed her history and that she is of low risk of uterine  cancer and that a tissue biopsy would confirm absence of uterine cancer at this time.  Patient expressed understanding of all that was discussed and was agreeable with moving forward with biopsy collection.    Reviewed with patient risks related to the surgical procedure. Reviewed risk of injury to surrounding organs, especially bowel and bladder, and neurovasculature injury. Reviewed risk of bleeding, infection, and DVT. Discussed resident physician participation in the procedure and examination under anesthesia being performed.   Reviewed preoperative instructions including NPO after midnight the night prior to procedure with clear fluids up until 2 hours prior to the procedure and hibiclens anti-bacterial wash. Hibiclens wash and instructions for use provided with surgical booklet.   Provided recommendations and expectations for pain management postoperatively.   Patient expressed understanding and all of her questions were answered to her satisfaction. Consent was obtained.     I have spent a total time of 45 minutes in caring for this patient on the day of the visit/encounter including Diagnostic results, Prognosis, Risks and benefits of tx options, Instructions for management, Patient and family education, Risk factor reductions, Impressions, Counseling / Coordination of care, Documenting in the medical record, Reviewing / ordering tests, medicine, procedures  , Obtaining or reviewing history  , and Communicating with other healthcare professionals .      Subjective:    Patient ID: Michaela Ware is a 39 y.o. female.  Chief Complaint   Patient presents with    Pre-op Exam     Pre-Op exam ([facility] 12/16).        Michaela presents for her preoperative history and physical for her scheduled surgical procedure.  She states that she is continuing to have significant pelvic pain mainly concentrated on the right side and wrapping around her back towards her buttocks occurring 1 week before her menses and then lasting  "through her menses and for 1 week afterward.  She states that she has been having some bleeding occurring.        The following portions of the patient's history were reviewed and updated as appropriate: allergies, current medications, past family history, past medical history, past social history, past surgical history, and problem list.    Review of Systems   Constitutional:  Negative for chills and fever.   Eyes:  Negative for visual disturbance.   Respiratory:  Negative for cough and shortness of breath.    Cardiovascular:  Negative for chest pain.   Gastrointestinal:  Positive for abdominal pain. Negative for constipation, diarrhea, nausea and vomiting.   Genitourinary:  Positive for menstrual problem and pelvic pain. Negative for difficulty urinating, dyspareunia, flank pain, vaginal bleeding, vaginal discharge and vaginal pain.   Musculoskeletal:  Negative for back pain.   Neurological:  Negative for dizziness, weakness and headaches.         Objective:  /82 (BP Location: Left arm, Patient Position: Sitting, Cuff Size: Standard)   Ht 5' 2\" (1.575 m)   Wt 73.9 kg (163 lb)   LMP 11/06/2024 (Exact Date)   BMI 29.81 kg/m²   Physical Exam  Constitutional:       General: She is not in acute distress.     Appearance: Normal appearance. She is not diaphoretic.   Genitourinary:      Vulva normal.      Right Labia: No rash, tenderness or lesions.     Left Labia: No tenderness, lesions or rash.     No vaginal discharge, erythema or bleeding.      No vaginal prolapse present.       Right Adnexa: not tender, not full and no mass present.     Left Adnexa: not tender, not full and no mass present.     Cervix is nulliparous.      No cervical motion tenderness, discharge, friability or polyp.      No parametrium nodularity or thickening present.     Uterus is not enlarged, tender or prolapsed.      No uterine mass detected.  HENT:      Head: Normocephalic and atraumatic.   Cardiovascular:      Rate and Rhythm: " Normal rate and regular rhythm.      Pulses: Normal pulses.      Heart sounds: Normal heart sounds. No murmur heard.     No gallop.   Pulmonary:      Effort: Pulmonary effort is normal. No respiratory distress.      Breath sounds: Normal breath sounds. No stridor. No wheezing or rales.   Abdominal:      Palpations: Abdomen is soft. There is no mass.      Tenderness: There is no abdominal tenderness. There is no guarding or rebound.   Musculoskeletal:      Right lower leg: No edema.      Left lower leg: No edema.   Neurological:      Mental Status: She is alert and oriented to person, place, and time.   Skin:     General: Skin is warm and dry.      Coloration: Skin is not pale.   Psychiatric:         Mood and Affect: Mood normal.         Behavior: Behavior normal.         Thought Content: Thought content normal.         Judgment: Judgment normal.   Vitals and nursing note reviewed.         Endometrial biopsy    Date/Time: 11/22/2024 9:00 AM    Performed by: Jaja Cardona MD  Authorized by: Jaja Cardona MD  Universal Protocol:  Consent: Verbal consent obtained. Written consent obtained.  Risks and benefits: risks, benefits and alternatives were discussed (uterine perforation, insufficient sampling, pain and bleeding)  Consent given by: patient  Patient understanding: patient states understanding of the procedure being performed  Patient consent: the patient's understanding of the procedure matches consent given  Procedure consent: procedure consent matches procedure scheduled  Required items: required blood products, implants, devices, and special equipment available  Patient identity confirmed: verbally with patient    Indication:     Indications: Other disorder of menstruation and other abnormal bleeding from female genital tract    Procedure:     Procedure: endometrial biopsy with Pipelle      A bivalve speculum was placed in the vagina: yes      Cervix cleaned and prepped: yes       The cervix was dilated: no      Uterus sounded: yes      Uterus sound depth (cm):  7    Specimen collected: specimen collected and sent to pathology      Patient tolerated procedure well with no complications: yes    Findings:     Uterus size:  Non-gravid    Cervix: normal

## 2024-11-22 NOTE — H&P (VIEW-ONLY)
Assessment/Plan:     Diagnoses and all orders for this visit:    Preoperative exam for gynecologic surgery  -     Endometrial biopsy  -     POCT urine HCG  -     Tissue Exam    Menorrhagia with regular cycle  -     Endometrial biopsy  -     POCT urine HCG  -     Tissue Exam    Pelvic pain  -     POCT urine HCG  -     Tissue Exam    Dysmenorrhea  -     POCT urine HCG  -     Tissue Exam    Other orders  -     methylphenidate (CONCERTA) 18 mg ER tablet; Take 18 mg by mouth every morning        We discussed the recommendation still for total laparoscopic hysterectomy with robotic assistance, bilateral salpingectomy, and cystoscopy for her scheduled procedure.  We have discussed in the past and again reiterated today that the procedure will definitely assist with her bleeding issues and her heavy menstrual bleeding that is occurring.  We discussed that I cannot guarantee complete resolution of her pelvic pain but the hope is that with the reduction in bleeding, we will reduce some of the inflammation in her pelvis related to her cycle and reduce her pelvic pain as well.  We discussed that if her ovaries are appearing abnormal we will address that during her surgery but the goal should be for her to leave with bilateral ovaries in place.  We discussed the removal of her ovaries at this time we will increase her risk for all cause mortality and increased risk for myocardial infarction secondary to early menopause via surgical menopause.  Patient expressed understanding.    We reviewed the recommendation for endometrial biopsy to rule out abnormal uterine tissue and uterine cancer prior to proceeding with surgical intervention.  We discussed that if endometrial biopsy is not performed she may carry a risk of approximately 4% of needing a secondary surgery performed if uterine cancer is found on pathology and that surgery should be performed by GYN oncologist.  We reviewed her history and that she is of low risk of uterine  cancer and that a tissue biopsy would confirm absence of uterine cancer at this time.  Patient expressed understanding of all that was discussed and was agreeable with moving forward with biopsy collection.    Reviewed with patient risks related to the surgical procedure. Reviewed risk of injury to surrounding organs, especially bowel and bladder, and neurovasculature injury. Reviewed risk of bleeding, infection, and DVT. Discussed resident physician participation in the procedure and examination under anesthesia being performed.   Reviewed preoperative instructions including NPO after midnight the night prior to procedure with clear fluids up until 2 hours prior to the procedure and hibiclens anti-bacterial wash. Hibiclens wash and instructions for use provided with surgical booklet.   Provided recommendations and expectations for pain management postoperatively.   Patient expressed understanding and all of her questions were answered to her satisfaction. Consent was obtained.     I have spent a total time of 45 minutes in caring for this patient on the day of the visit/encounter including Diagnostic results, Prognosis, Risks and benefits of tx options, Instructions for management, Patient and family education, Risk factor reductions, Impressions, Counseling / Coordination of care, Documenting in the medical record, Reviewing / ordering tests, medicine, procedures  , Obtaining or reviewing history  , and Communicating with other healthcare professionals .      Subjective:    Patient ID: Michaela Ware is a 39 y.o. female.  Chief Complaint   Patient presents with    Pre-op Exam     Pre-Op exam ([facility] 12/16).        Michaela presents for her preoperative history and physical for her scheduled surgical procedure.  She states that she is continuing to have significant pelvic pain mainly concentrated on the right side and wrapping around her back towards her buttocks occurring 1 week before her menses and then lasting  "through her menses and for 1 week afterward.  She states that she has been having some bleeding occurring.        The following portions of the patient's history were reviewed and updated as appropriate: allergies, current medications, past family history, past medical history, past social history, past surgical history, and problem list.    Review of Systems   Constitutional:  Negative for chills and fever.   Eyes:  Negative for visual disturbance.   Respiratory:  Negative for cough and shortness of breath.    Cardiovascular:  Negative for chest pain.   Gastrointestinal:  Positive for abdominal pain. Negative for constipation, diarrhea, nausea and vomiting.   Genitourinary:  Positive for menstrual problem and pelvic pain. Negative for difficulty urinating, dyspareunia, flank pain, vaginal bleeding, vaginal discharge and vaginal pain.   Musculoskeletal:  Negative for back pain.   Neurological:  Negative for dizziness, weakness and headaches.         Objective:  /82 (BP Location: Left arm, Patient Position: Sitting, Cuff Size: Standard)   Ht 5' 2\" (1.575 m)   Wt 73.9 kg (163 lb)   LMP 11/06/2024 (Exact Date)   BMI 29.81 kg/m²   Physical Exam  Constitutional:       General: She is not in acute distress.     Appearance: Normal appearance. She is not diaphoretic.   Genitourinary:      Vulva normal.      Right Labia: No rash, tenderness or lesions.     Left Labia: No tenderness, lesions or rash.     No vaginal discharge, erythema or bleeding.      No vaginal prolapse present.       Right Adnexa: not tender, not full and no mass present.     Left Adnexa: not tender, not full and no mass present.     Cervix is nulliparous.      No cervical motion tenderness, discharge, friability or polyp.      No parametrium nodularity or thickening present.     Uterus is not enlarged, tender or prolapsed.      No uterine mass detected.  HENT:      Head: Normocephalic and atraumatic.   Cardiovascular:      Rate and Rhythm: " Normal rate and regular rhythm.      Pulses: Normal pulses.      Heart sounds: Normal heart sounds. No murmur heard.     No gallop.   Pulmonary:      Effort: Pulmonary effort is normal. No respiratory distress.      Breath sounds: Normal breath sounds. No stridor. No wheezing or rales.   Abdominal:      Palpations: Abdomen is soft. There is no mass.      Tenderness: There is no abdominal tenderness. There is no guarding or rebound.   Musculoskeletal:      Right lower leg: No edema.      Left lower leg: No edema.   Neurological:      Mental Status: She is alert and oriented to person, place, and time.   Skin:     General: Skin is warm and dry.      Coloration: Skin is not pale.   Psychiatric:         Mood and Affect: Mood normal.         Behavior: Behavior normal.         Thought Content: Thought content normal.         Judgment: Judgment normal.   Vitals and nursing note reviewed.         Endometrial biopsy    Date/Time: 11/22/2024 9:00 AM    Performed by: Jaja Cardona MD  Authorized by: Jaja Cardona MD  Universal Protocol:  Consent: Verbal consent obtained. Written consent obtained.  Risks and benefits: risks, benefits and alternatives were discussed (uterine perforation, insufficient sampling, pain and bleeding)  Consent given by: patient  Patient understanding: patient states understanding of the procedure being performed  Patient consent: the patient's understanding of the procedure matches consent given  Procedure consent: procedure consent matches procedure scheduled  Required items: required blood products, implants, devices, and special equipment available  Patient identity confirmed: verbally with patient    Indication:     Indications: Other disorder of menstruation and other abnormal bleeding from female genital tract    Procedure:     Procedure: endometrial biopsy with [place]      A bivalve speculum was placed in the vagina: yes      Cervix cleaned and prepped: yes       The cervix was dilated: no      Uterus sounded: yes      Uterus sound depth (cm):  7    Specimen collected: specimen collected and sent to pathology      Patient tolerated procedure well with no complications: yes    Findings:     Uterus size:  Non-gravid    Cervix: normal

## 2024-12-02 ENCOUNTER — RESULTS FOLLOW-UP (OUTPATIENT)
Dept: OBGYN CLINIC | Facility: CLINIC | Age: 39
End: 2024-12-02

## 2024-12-02 PROCEDURE — 88305 TISSUE EXAM BY PATHOLOGIST: CPT | Performed by: PATHOLOGY

## 2024-12-04 ENCOUNTER — APPOINTMENT (OUTPATIENT)
Dept: LAB | Facility: CLINIC | Age: 39
End: 2024-12-04
Payer: COMMERCIAL

## 2024-12-04 ENCOUNTER — LAB REQUISITION (OUTPATIENT)
Dept: LAB | Facility: HOSPITAL | Age: 39
End: 2024-12-04
Payer: COMMERCIAL

## 2024-12-04 ENCOUNTER — RESULTS FOLLOW-UP (OUTPATIENT)
Dept: OBGYN CLINIC | Facility: CLINIC | Age: 39
End: 2024-12-04

## 2024-12-04 DIAGNOSIS — D21.9 BENIGN NEOPLASM OF CONNECTIVE AND OTHER SOFT TISSUE, UNSPECIFIED: ICD-10-CM

## 2024-12-04 DIAGNOSIS — Z01.812 ENCOUNTER FOR PRE-OPERATIVE LABORATORY TESTING: ICD-10-CM

## 2024-12-04 DIAGNOSIS — N94.6 DYSMENORRHEA: ICD-10-CM

## 2024-12-04 DIAGNOSIS — D21.9 FIBROIDS: ICD-10-CM

## 2024-12-04 DIAGNOSIS — R10.2 PELVIC PAIN: ICD-10-CM

## 2024-12-04 DIAGNOSIS — N92.0 MENORRHAGIA WITH REGULAR CYCLE: ICD-10-CM

## 2024-12-04 DIAGNOSIS — D50.8 OTHER IRON DEFICIENCY ANEMIA: ICD-10-CM

## 2024-12-04 LAB
ABO GROUP BLD: NORMAL
ALBUMIN SERPL BCG-MCNC: 3.9 G/DL (ref 3.5–5)
ALP SERPL-CCNC: 74 U/L (ref 34–104)
ALT SERPL W P-5'-P-CCNC: 9 U/L (ref 7–52)
ANION GAP SERPL CALCULATED.3IONS-SCNC: 6 MMOL/L (ref 4–13)
AST SERPL W P-5'-P-CCNC: 10 U/L (ref 13–39)
BASOPHILS # BLD AUTO: 0.06 THOUSANDS/ÂΜL (ref 0–0.1)
BASOPHILS NFR BLD AUTO: 1 % (ref 0–1)
BILIRUB SERPL-MCNC: 0.39 MG/DL (ref 0.2–1)
BLD GP AB SCN SERPL QL: NEGATIVE
BUN SERPL-MCNC: 13 MG/DL (ref 5–25)
CALCIUM SERPL-MCNC: 8.6 MG/DL (ref 8.4–10.2)
CHLORIDE SERPL-SCNC: 106 MMOL/L (ref 96–108)
CO2 SERPL-SCNC: 25 MMOL/L (ref 21–32)
CREAT SERPL-MCNC: 0.85 MG/DL (ref 0.6–1.3)
EOSINOPHIL # BLD AUTO: 0.21 THOUSAND/ÂΜL (ref 0–0.61)
EOSINOPHIL NFR BLD AUTO: 3 % (ref 0–6)
ERYTHROCYTE [DISTWIDTH] IN BLOOD BY AUTOMATED COUNT: 13.8 % (ref 11.6–15.1)
EST. AVERAGE GLUCOSE BLD GHB EST-MCNC: 114 MG/DL
GFR SERPL CREATININE-BSD FRML MDRD: 85 ML/MIN/1.73SQ M
GLUCOSE P FAST SERPL-MCNC: 103 MG/DL (ref 65–99)
HBA1C MFR BLD: 5.6 %
HCT VFR BLD AUTO: 36.5 % (ref 34.8–46.1)
HGB BLD-MCNC: 11.8 G/DL (ref 11.5–15.4)
IMM GRANULOCYTES # BLD AUTO: 0.03 THOUSAND/UL (ref 0–0.2)
IMM GRANULOCYTES NFR BLD AUTO: 0 % (ref 0–2)
LYMPHOCYTES # BLD AUTO: 1.93 THOUSANDS/ÂΜL (ref 0.6–4.47)
LYMPHOCYTES NFR BLD AUTO: 25 % (ref 14–44)
MCH RBC QN AUTO: 28.6 PG (ref 26.8–34.3)
MCHC RBC AUTO-ENTMCNC: 32.3 G/DL (ref 31.4–37.4)
MCV RBC AUTO: 88 FL (ref 82–98)
MONOCYTES # BLD AUTO: 0.47 THOUSAND/ÂΜL (ref 0.17–1.22)
MONOCYTES NFR BLD AUTO: 6 % (ref 4–12)
NEUTROPHILS # BLD AUTO: 5.18 THOUSANDS/ÂΜL (ref 1.85–7.62)
NEUTS SEG NFR BLD AUTO: 65 % (ref 43–75)
NRBC BLD AUTO-RTO: 0 /100 WBCS
PLATELET # BLD AUTO: 295 THOUSANDS/UL (ref 149–390)
PMV BLD AUTO: 10.7 FL (ref 8.9–12.7)
POTASSIUM SERPL-SCNC: 4.3 MMOL/L (ref 3.5–5.3)
PROT SERPL-MCNC: 6.6 G/DL (ref 6.4–8.4)
RBC # BLD AUTO: 4.13 MILLION/UL (ref 3.81–5.12)
RH BLD: POSITIVE
SODIUM SERPL-SCNC: 137 MMOL/L (ref 135–147)
SPECIMEN EXPIRATION DATE: NORMAL
WBC # BLD AUTO: 7.88 THOUSAND/UL (ref 4.31–10.16)

## 2024-12-04 PROCEDURE — 86850 RBC ANTIBODY SCREEN: CPT | Performed by: OBSTETRICS & GYNECOLOGY

## 2024-12-04 PROCEDURE — 80053 COMPREHEN METABOLIC PANEL: CPT

## 2024-12-04 PROCEDURE — 86901 BLOOD TYPING SEROLOGIC RH(D): CPT | Performed by: OBSTETRICS & GYNECOLOGY

## 2024-12-04 PROCEDURE — 36415 COLL VENOUS BLD VENIPUNCTURE: CPT

## 2024-12-04 PROCEDURE — 86900 BLOOD TYPING SEROLOGIC ABO: CPT | Performed by: OBSTETRICS & GYNECOLOGY

## 2024-12-04 PROCEDURE — 85025 COMPLETE CBC W/AUTO DIFF WBC: CPT

## 2024-12-04 PROCEDURE — 83036 HEMOGLOBIN GLYCOSYLATED A1C: CPT

## 2024-12-05 NOTE — PRE-PROCEDURE INSTRUCTIONS
Pre-Surgery Instructions:   Medication Instructions    methylphenidate (CONCERTA) 18 mg ER tablet Hold day of surgery.    Synthroid 75 MCG tablet Take day of surgery.   Medication instructions for day surgery reviewed. Please use only a sip of water to take your instructed medications. Avoid all over the counter vitamins, supplements and NSAIDS for one week prior to surgery per anesthesia guidelines. Tylenol is ok to take as needed.     You will receive a call one business day prior to surgery with an arrival time and hospital directions. If your surgery is scheduled on a Monday, the hospital will be calling you on the Friday prior to your surgery. If you have not heard from anyone by 8pm, please call the hospital supervisor through the hospital  at [phone]. ([place] [phone] or [place] [phone]).    Do not eat or drink anything after midnight the night before your surgery, including candy, mints, lifesavers, or chewing gum. Do not drink alcohol 24hrs before your surgery. Try not to smoke at least 24hrs before your surgery.       Follow the pre surgery showering instructions as listed in the “My Surgical Experience Booklet” or otherwise provided by your surgeon's office. Do not use a blade to shave the surgical area 1 week before surgery. It is okay to use a clean electric clippers up to 24 hours before surgery. Do not apply any lotions, creams, including makeup, cologne, deodorant, or perfumes after showering on the day of your surgery. Do not use dry shampoo, hair spray, hair gel, or any type of hair products.     No contact lenses, eye make-up, or artificial eyelashes. Remove nail polish, including gel polish, and any artificial, gel, or acrylic nails if possible. Remove all jewelry including rings and body piercing jewelry.     Wear causal clothing that is easy to take on and off. Consider your type of surgery.    Keep any valuables, jewelry, piercings at home. Please bring any specially  ordered equipment (sling, braces) if indicated.    Arrange for a responsible person to drive you to and from the hospital on the day of your surgery. Please confirm the visitor policy for the day of your procedure when you receive your phone call with an arrival time.     Call the surgeon's office with any new illnesses, exposures, or additional questions prior to surgery.    Please reference your “My Surgical Experience Booklet” for additional information to prepare for your upcoming surgery.      Confirmed patient received 3 pre-surgical drinks with instructions.

## 2024-12-14 ENCOUNTER — ANESTHESIA EVENT (OUTPATIENT)
Dept: PERIOP | Facility: HOSPITAL | Age: 39
End: 2024-12-14
Payer: COMMERCIAL

## 2024-12-15 PROBLEM — N92.0 MENORRHAGIA: Status: ACTIVE | Noted: 2024-12-15

## 2024-12-16 ENCOUNTER — ANESTHESIA (OUTPATIENT)
Dept: PERIOP | Facility: HOSPITAL | Age: 39
End: 2024-12-16
Payer: COMMERCIAL

## 2024-12-16 ENCOUNTER — HOSPITAL ENCOUNTER (OUTPATIENT)
Facility: HOSPITAL | Age: 39
Setting detail: OUTPATIENT SURGERY
Discharge: HOME | End: 2024-12-16
Attending: OBSTETRICS & GYNECOLOGY | Admitting: OBSTETRICS & GYNECOLOGY
Payer: COMMERCIAL

## 2024-12-16 VITALS
SYSTOLIC BLOOD PRESSURE: 126 MMHG | TEMPERATURE: 98.7 F | HEART RATE: 92 BPM | RESPIRATION RATE: 20 BRPM | BODY MASS INDEX: 31.47 KG/M2 | DIASTOLIC BLOOD PRESSURE: 76 MMHG | HEIGHT: 62 IN | OXYGEN SATURATION: 100 % | WEIGHT: 171 LBS

## 2024-12-16 DIAGNOSIS — N92.0 MENORRHAGIA WITH REGULAR CYCLE: ICD-10-CM

## 2024-12-16 DIAGNOSIS — D21.9 FIBROIDS: ICD-10-CM

## 2024-12-16 DIAGNOSIS — R10.2 PELVIC PAIN: ICD-10-CM

## 2024-12-16 DIAGNOSIS — G89.18 POST-OP PAIN: Primary | ICD-10-CM

## 2024-12-16 DIAGNOSIS — N94.6 DYSMENORRHEA: ICD-10-CM

## 2024-12-16 PROBLEM — Z90.79 STATUS POST BILATERAL SALPINGECTOMY: Status: ACTIVE | Noted: 2024-12-16

## 2024-12-16 PROBLEM — Z90.721 STATUS POST LEFT OOPHORECTOMY: Status: ACTIVE | Noted: 2024-12-16

## 2024-12-16 PROBLEM — Z90.710 STATUS POST TOTAL HYSTERECTOMY: Status: ACTIVE | Noted: 2024-12-16

## 2024-12-16 PROBLEM — N80.9 ENDOMETRIOSIS DETERMINED BY LAPAROSCOPY: Status: ACTIVE | Noted: 2024-12-16

## 2024-12-16 LAB
ABO GROUP BLD: NORMAL
EXT PREGNANCY TEST URINE: NEGATIVE
EXT. CONTROL: NORMAL
RH BLD: POSITIVE

## 2024-12-16 PROCEDURE — 58571 TLH W/T/O 250 G OR LESS: CPT | Performed by: OBSTETRICS & GYNECOLOGY

## 2024-12-16 PROCEDURE — S2900 ROBOTIC SURGICAL SYSTEM: HCPCS | Performed by: OBSTETRICS & GYNECOLOGY

## 2024-12-16 PROCEDURE — NC001 PR NO CHARGE: Performed by: PHYSICIAN ASSISTANT

## 2024-12-16 PROCEDURE — 81025 URINE PREGNANCY TEST: CPT | Performed by: OBSTETRICS & GYNECOLOGY

## 2024-12-16 PROCEDURE — 88307 TISSUE EXAM BY PATHOLOGIST: CPT | Performed by: STUDENT IN AN ORGANIZED HEALTH CARE EDUCATION/TRAINING PROGRAM

## 2024-12-16 RX ORDER — OXYCODONE HYDROCHLORIDE 5 MG/1
5 TABLET ORAL EVERY 4 HOURS PRN
Qty: 10 TABLET | Refills: 0 | Status: SHIPPED | OUTPATIENT
Start: 2024-12-16

## 2024-12-16 RX ORDER — SODIUM CHLORIDE, SODIUM LACTATE, POTASSIUM CHLORIDE, CALCIUM CHLORIDE 600; 310; 30; 20 MG/100ML; MG/100ML; MG/100ML; MG/100ML
100 INJECTION, SOLUTION INTRAVENOUS CONTINUOUS
Status: DISCONTINUED | OUTPATIENT
Start: 2024-12-16 | End: 2024-12-16 | Stop reason: HOSPADM

## 2024-12-16 RX ORDER — OXYCODONE HYDROCHLORIDE 5 MG/1
5 TABLET ORAL EVERY 4 HOURS PRN
Status: DISCONTINUED | OUTPATIENT
Start: 2024-12-16 | End: 2024-12-16 | Stop reason: HOSPADM

## 2024-12-16 RX ORDER — MAGNESIUM HYDROXIDE 1200 MG/15ML
LIQUID ORAL AS NEEDED
Status: DISCONTINUED | OUTPATIENT
Start: 2024-12-16 | End: 2024-12-16 | Stop reason: HOSPADM

## 2024-12-16 RX ORDER — DEXAMETHASONE SODIUM PHOSPHATE 10 MG/ML
INJECTION, SOLUTION INTRAMUSCULAR; INTRAVENOUS AS NEEDED
Status: DISCONTINUED | OUTPATIENT
Start: 2024-12-16 | End: 2024-12-16

## 2024-12-16 RX ORDER — KETOROLAC TROMETHAMINE 30 MG/ML
INJECTION, SOLUTION INTRAMUSCULAR; INTRAVENOUS AS NEEDED
Status: DISCONTINUED | OUTPATIENT
Start: 2024-12-16 | End: 2024-12-16

## 2024-12-16 RX ORDER — GABAPENTIN 100 MG/1
200 CAPSULE ORAL ONCE
Status: COMPLETED | OUTPATIENT
Start: 2024-12-16 | End: 2024-12-16

## 2024-12-16 RX ORDER — FENTANYL CITRATE/PF 50 MCG/ML
50 SYRINGE (ML) INJECTION
Status: DISCONTINUED | OUTPATIENT
Start: 2024-12-16 | End: 2024-12-16 | Stop reason: HOSPADM

## 2024-12-16 RX ORDER — PHENAZOPYRIDINE HYDROCHLORIDE 100 MG/1
100 TABLET, FILM COATED ORAL ONCE
Status: COMPLETED | OUTPATIENT
Start: 2024-12-16 | End: 2024-12-16

## 2024-12-16 RX ORDER — SCOLOPAMINE TRANSDERMAL SYSTEM 1 MG/1
1 PATCH, EXTENDED RELEASE TRANSDERMAL ONCE
Status: DISCONTINUED | OUTPATIENT
Start: 2024-12-16 | End: 2024-12-16 | Stop reason: HOSPADM

## 2024-12-16 RX ORDER — BUPIVACAINE HYDROCHLORIDE AND EPINEPHRINE 5; 5 MG/ML; UG/ML
INJECTION, SOLUTION EPIDURAL; INTRACAUDAL; PERINEURAL AS NEEDED
Status: DISCONTINUED | OUTPATIENT
Start: 2024-12-16 | End: 2024-12-16 | Stop reason: HOSPADM

## 2024-12-16 RX ORDER — ONDANSETRON 2 MG/ML
INJECTION INTRAMUSCULAR; INTRAVENOUS AS NEEDED
Status: DISCONTINUED | OUTPATIENT
Start: 2024-12-16 | End: 2024-12-16

## 2024-12-16 RX ORDER — SODIUM CHLORIDE, SODIUM LACTATE, POTASSIUM CHLORIDE, CALCIUM CHLORIDE 600; 310; 30; 20 MG/100ML; MG/100ML; MG/100ML; MG/100ML
INJECTION, SOLUTION INTRAVENOUS CONTINUOUS PRN
Status: DISCONTINUED | OUTPATIENT
Start: 2024-12-16 | End: 2024-12-16

## 2024-12-16 RX ORDER — IBUPROFEN 600 MG/1
600 TABLET, FILM COATED ORAL EVERY 6 HOURS PRN
Status: DISCONTINUED | OUTPATIENT
Start: 2024-12-16 | End: 2024-12-16 | Stop reason: HOSPADM

## 2024-12-16 RX ORDER — CEFAZOLIN SODIUM 1 G/50ML
1000 SOLUTION INTRAVENOUS ONCE
Status: COMPLETED | OUTPATIENT
Start: 2024-12-16 | End: 2024-12-16

## 2024-12-16 RX ORDER — CELECOXIB 200 MG/1
200 CAPSULE ORAL ONCE
Status: COMPLETED | OUTPATIENT
Start: 2024-12-16 | End: 2024-12-16

## 2024-12-16 RX ORDER — ONDANSETRON 2 MG/ML
4 INJECTION INTRAMUSCULAR; INTRAVENOUS ONCE AS NEEDED
Status: DISCONTINUED | OUTPATIENT
Start: 2024-12-16 | End: 2024-12-16 | Stop reason: HOSPADM

## 2024-12-16 RX ORDER — ACETAMINOPHEN 325 MG/1
975 TABLET ORAL ONCE
Status: COMPLETED | OUTPATIENT
Start: 2024-12-16 | End: 2024-12-16

## 2024-12-16 RX ORDER — HYDROMORPHONE HCL/PF 1 MG/ML
SYRINGE (ML) INJECTION AS NEEDED
Status: DISCONTINUED | OUTPATIENT
Start: 2024-12-16 | End: 2024-12-16

## 2024-12-16 RX ORDER — ACETAMINOPHEN 325 MG/1
975 TABLET ORAL EVERY 6 HOURS PRN
Status: DISCONTINUED | OUTPATIENT
Start: 2024-12-16 | End: 2024-12-16 | Stop reason: HOSPADM

## 2024-12-16 RX ORDER — CEFAZOLIN SODIUM 1 G/3ML
INJECTION, POWDER, FOR SOLUTION INTRAMUSCULAR; INTRAVENOUS AS NEEDED
Status: DISCONTINUED | OUTPATIENT
Start: 2024-12-16 | End: 2024-12-16

## 2024-12-16 RX ORDER — ROCURONIUM BROMIDE 10 MG/ML
INJECTION, SOLUTION INTRAVENOUS AS NEEDED
Status: DISCONTINUED | OUTPATIENT
Start: 2024-12-16 | End: 2024-12-16

## 2024-12-16 RX ORDER — FENTANYL CITRATE 50 UG/ML
INJECTION, SOLUTION INTRAMUSCULAR; INTRAVENOUS AS NEEDED
Status: DISCONTINUED | OUTPATIENT
Start: 2024-12-16 | End: 2024-12-16

## 2024-12-16 RX ORDER — PROPOFOL 10 MG/ML
INJECTION, EMULSION INTRAVENOUS AS NEEDED
Status: DISCONTINUED | OUTPATIENT
Start: 2024-12-16 | End: 2024-12-16

## 2024-12-16 RX ORDER — MIDAZOLAM HYDROCHLORIDE 2 MG/2ML
INJECTION, SOLUTION INTRAMUSCULAR; INTRAVENOUS AS NEEDED
Status: DISCONTINUED | OUTPATIENT
Start: 2024-12-16 | End: 2024-12-16

## 2024-12-16 RX ORDER — LIDOCAINE HYDROCHLORIDE 20 MG/ML
INJECTION, SOLUTION EPIDURAL; INFILTRATION; INTRACAUDAL; PERINEURAL AS NEEDED
Status: DISCONTINUED | OUTPATIENT
Start: 2024-12-16 | End: 2024-12-16

## 2024-12-16 RX ORDER — SENNOSIDES 8.6 MG
650 CAPSULE ORAL EVERY 8 HOURS PRN
Qty: 30 TABLET | Refills: 0 | Status: SHIPPED | OUTPATIENT
Start: 2024-12-16

## 2024-12-16 RX ORDER — ONDANSETRON 2 MG/ML
4 INJECTION INTRAMUSCULAR; INTRAVENOUS EVERY 6 HOURS PRN
Status: DISCONTINUED | OUTPATIENT
Start: 2024-12-16 | End: 2024-12-16 | Stop reason: HOSPADM

## 2024-12-16 RX ORDER — PHENYLEPHRINE HCL IN 0.9% NACL 1 MG/10 ML
SYRINGE (ML) INTRAVENOUS AS NEEDED
Status: DISCONTINUED | OUTPATIENT
Start: 2024-12-16 | End: 2024-12-16

## 2024-12-16 RX ORDER — METRONIDAZOLE 500 MG/100ML
INJECTION, SOLUTION INTRAVENOUS CONTINUOUS PRN
Status: DISCONTINUED | OUTPATIENT
Start: 2024-12-16 | End: 2024-12-16

## 2024-12-16 RX ORDER — IBUPROFEN 600 MG/1
600 TABLET, FILM COATED ORAL EVERY 6 HOURS PRN
Qty: 60 TABLET | Refills: 0 | Status: SHIPPED | OUTPATIENT
Start: 2024-12-16

## 2024-12-16 RX ADMIN — ACETAMINOPHEN 975 MG: 325 TABLET, FILM COATED ORAL at 15:46

## 2024-12-16 RX ADMIN — ONDANSETRON 4 MG: 2 INJECTION INTRAMUSCULAR; INTRAVENOUS at 15:22

## 2024-12-16 RX ADMIN — CELECOXIB 200 MG: 200 CAPSULE ORAL at 07:25

## 2024-12-16 RX ADMIN — FENTANYL CITRATE 50 MCG: 50 INJECTION INTRAMUSCULAR; INTRAVENOUS at 10:12

## 2024-12-16 RX ADMIN — SUGAMMADEX 200 MG: 100 INJECTION, SOLUTION INTRAVENOUS at 13:27

## 2024-12-16 RX ADMIN — PROPOFOL 200 MG: 10 INJECTION, EMULSION INTRAVENOUS at 08:06

## 2024-12-16 RX ADMIN — MIDAZOLAM 2 MG: 1 INJECTION INTRAMUSCULAR; INTRAVENOUS at 07:58

## 2024-12-16 RX ADMIN — SODIUM CHLORIDE, SODIUM LACTATE, POTASSIUM CHLORIDE, AND CALCIUM CHLORIDE: .6; .31; .03; .02 INJECTION, SOLUTION INTRAVENOUS at 12:50

## 2024-12-16 RX ADMIN — METRONIDAZOLE: 500 INJECTION, SOLUTION INTRAVENOUS at 09:21

## 2024-12-16 RX ADMIN — CEFAZOLIN SODIUM 2000 MG: 1 SOLUTION INTRAVENOUS at 08:13

## 2024-12-16 RX ADMIN — ROCURONIUM BROMIDE 10 MG: 10 INJECTION, SOLUTION INTRAVENOUS at 11:08

## 2024-12-16 RX ADMIN — ROCURONIUM BROMIDE 50 MG: 10 INJECTION, SOLUTION INTRAVENOUS at 08:07

## 2024-12-16 RX ADMIN — DEXAMETHASONE SODIUM PHOSPHATE 10 MG: 10 INJECTION, SOLUTION INTRAMUSCULAR; INTRAVENOUS at 09:01

## 2024-12-16 RX ADMIN — ROCURONIUM BROMIDE 10 MG: 10 INJECTION, SOLUTION INTRAVENOUS at 12:31

## 2024-12-16 RX ADMIN — ROCURONIUM BROMIDE 10 MG: 10 INJECTION, SOLUTION INTRAVENOUS at 09:42

## 2024-12-16 RX ADMIN — Medication 100 MCG: at 09:15

## 2024-12-16 RX ADMIN — SODIUM CHLORIDE, SODIUM LACTATE, POTASSIUM CHLORIDE, AND CALCIUM CHLORIDE: .6; .31; .03; .02 INJECTION, SOLUTION INTRAVENOUS at 08:07

## 2024-12-16 RX ADMIN — PHENAZOPYRIDINE 100 MG: 100 TABLET ORAL at 07:25

## 2024-12-16 RX ADMIN — GABAPENTIN 200 MG: 100 CAPSULE ORAL at 07:25

## 2024-12-16 RX ADMIN — ROCURONIUM BROMIDE 10 MG: 10 INJECTION, SOLUTION INTRAVENOUS at 10:09

## 2024-12-16 RX ADMIN — CEFAZOLIN 2000 MG: 1 INJECTION, POWDER, FOR SOLUTION INTRAMUSCULAR; INTRAVENOUS at 12:15

## 2024-12-16 RX ADMIN — HYDROMORPHONE HYDROCHLORIDE 0.5 MG: 1 INJECTION, SOLUTION INTRAMUSCULAR; INTRAVENOUS; SUBCUTANEOUS at 08:27

## 2024-12-16 RX ADMIN — KETOROLAC TROMETHAMINE 30 MG: 30 INJECTION, SOLUTION INTRAMUSCULAR at 13:06

## 2024-12-16 RX ADMIN — LIDOCAINE HYDROCHLORIDE 100 MG: 20 INJECTION, SOLUTION EPIDURAL; INFILTRATION; INTRACAUDAL; PERINEURAL at 08:06

## 2024-12-16 RX ADMIN — SCOPOLAMINE 1 PATCH: 1 SYSTEM TRANSDERMAL at 07:37

## 2024-12-16 RX ADMIN — HYDROMORPHONE HYDROCHLORIDE 0.5 MG: 1 INJECTION, SOLUTION INTRAMUSCULAR; INTRAVENOUS; SUBCUTANEOUS at 09:40

## 2024-12-16 RX ADMIN — FENTANYL CITRATE 50 MCG: 50 INJECTION INTRAMUSCULAR; INTRAVENOUS at 10:24

## 2024-12-16 RX ADMIN — ONDANSETRON 4 MG: 2 INJECTION INTRAMUSCULAR; INTRAVENOUS at 12:33

## 2024-12-16 RX ADMIN — ACETAMINOPHEN 975 MG: 325 TABLET, FILM COATED ORAL at 07:25

## 2024-12-16 RX ADMIN — FENTANYL CITRATE 100 MCG: 50 INJECTION INTRAMUSCULAR; INTRAVENOUS at 08:06

## 2024-12-16 RX ADMIN — Medication 100 MCG: at 08:39

## 2024-12-16 NOTE — ANESTHESIA POSTPROCEDURE EVALUATION
Post-Op Assessment Note    Last Filed PACU Vitals:  Vitals Value Taken Time   Temp 98.5 °F (36.9 °C) 12/16/24 1445   Pulse 99 12/16/24 1449   /76 12/16/24 1448   Resp 17 12/16/24 1449   SpO2 99 % 12/16/24 1449   Vitals shown include unfiled device data.    Modified [place]:  Activity: 2 (12/16/2024  4:07 PM)  Respiration: 2 (12/16/2024  4:07 PM)  Circulation: 2 (12/16/2024  4:07 PM)  Consciousness: 2 (12/16/2024  4:07 PM)  Oxygen Saturation: 2 (12/16/2024  4:07 PM)  Modified [place] Score: 10 (12/16/2024  4:07 PM)

## 2024-12-16 NOTE — INTERIM OP NOTE
EXAM UNDER ANESTHESIA; HYSTERECTOMY LAPAROSCOPIC TOTAL (LTH) W/ ROBOTICS, EXTENSIVE LYSIS OF ADHESIONS. LEFT SALPINGO-OPHORECTOMY, RIGHT SALPINGECTOMY., CYSTOSCOPY  Postoperative Note  PATIENT NAME: Michaela Ware  : 1985  MRN: [id number]  [facility] OR ROOM 02    Surgery Date: 2024    Preop Diagnosis:  Menorrhagia with regular cycle [N92.0]  Fibroids [D21.9]  Pelvic pain [R10.2]  Dysmenorrhea [N94.6]    Post-Op Diagnosis Codes:     * Menorrhagia with regular cycle [N92.0]     * Fibroids [D21.9]     * Pelvic pain [R10.2]     * Dysmenorrhea [N94.6]    Procedure(s) (LRB):  EXAM UNDER ANESTHESIA; HYSTERECTOMY LAPAROSCOPIC TOTAL (LTH) W/ ROBOTICS, EXTENSIVE LYSIS OF ADHESIONS. LEFT SALPINGO-OPHORECTOMY, RIGHT SALPINGECTOMY. (N/A)  CYSTOSCOPY (N/A)    Surgeons and Role:     * Jaja Cardona MD - Primary     * Dean Martinez MD - Assisting     * Myrna Montemayor MD - Assisting     * Chely Overton PA-C - Assisting     * Myrna Joseph MD - Assisting    Specimens:  ID Type Source Tests Collected by Time Destination   1 : Uterus, Cervix, B/L fallopian tubes, and left ovary Tissue Uterus TISSUE EXAM Jaja Cardona MD 2024 [id number]        Estimated Blood Loss:   100 mL    Anesthesia Type:   General     Findings:    1.  Normal external genitalia.  No lacerations, no ulcerations, no lesions visualized.  Bimanual exam revealed uterus anteverted in position, with bulky contour measuring about 10 weeks in size, no adnexal masses palpated bilaterally.  2.  Laparoscopic evaluation revealed extensive adhesive disease within the posterior cul-de-sac.  Uterus bulky in appearance with posterior fibroid visualized in a subserosal position.  No adhesive disease was noted in the anterior cul-de-sac near the bladder.  Posteriorly adhesive disease was notable throughout the posterior cul-de-sac encompassing bilateral ovaries, bilateral tubes, with filmy adhesions to the sigmoid colon.  3.  Uterus was sounded  to 9 cm with placement of an 8 cm [place] tip for uterine manipulation.  Vaginal vault was normal in appearance.  4.  Extensive lysis of adhesions was performed including evaluation of the retroperitoneal space along the left pelvic sidewall to visualize the ureter and allow for dissection and removal of the left ovary and left fallopian tube.  Once bilateral ovaries were dissected away from uterus, adhesion between the rectum to the posterior peritoneal serosa was visualized between the uterosacral ligaments.  Due to the close proximity to the colpotomy surgical dissection site, intraoperative consultation was placed to general surgery as well as GYN oncology to visualize dissection near the rectum.  Please see Dr. Martinez's note for further details of his evaluation.  GYN oncologist, Dr. Myrna Montemayor, also provided intraoperative consultation for evaluation of dissection of the uterine arteries at the colpotomy superior to the site of the adhesions encompassing the rectum.  A total hysterectomy was able to be performed without further dissection and no bowel injury or ureteral injury was noted.  5.  Cystoscopy was performed at the conclusion of the procedure to evaluate the bladder.  Bilateral ureteral jets were visualized and no defect or injury to the bladder was visualized.    Complications:   None      SIGNATURE: Jaja Cardona MD   DATE: December 16, 2024   TIME: 2:37 PM

## 2024-12-16 NOTE — PERIOPERATIVE NURSING NOTE
Patient received from PACU RN Rosa.  Patient is alert and awake; no distress noted.  Patient reports 4/10 pain in lower abdomen. This RN (author) will release medication orders.  Abdominal incisional sites (x5 ports) are clean, dry, and intact; no drainage noted.  No vaginal drainage noted.   PO liquids and solids provided.   Patient requesting to be ambulated to bathroom prior to contacting family.

## 2024-12-16 NOTE — QUICK NOTE
I was called by the operating room nurse to operating room 2 at [facility].  When I came in the patient was already under anesthesia.  The robot was docked.  Part of the dissection around the uterus was done.  I was asked by the OB/GYN attending to inspect the area where rectum was densely adherent to the uterus.  I took over at the console.  A 30 degree robotic camera was employed.  Suction was used to suck out blood from the pouch of Hilton.  I was able to delineate the trajectory of sigmoid colon going up to the upper rectum.  No overt injuries were seen.  The upper rectum was densely adhered to the uterus.  Blunt dissection was carried out in that area to try and  it from the uterus.  At that point decision was made by the OB/GYN attending in concordance with Gyne oncology to proceed with partial hysterectomy.  The care of the patient then was transferred back to the primary treating team.

## 2024-12-16 NOTE — INTERVAL H&P NOTE
H&P reviewed. After examining the patient I find no changes in the patients condition since the H&P had been written.    Vitals:    12/16/24 0729   BP: 138/82   Pulse: 78   Resp: 18   Temp: 97.6 °F (36.4 °C)   SpO2: 100%

## 2024-12-16 NOTE — ANESTHESIA PREPROCEDURE EVALUATION
Procedure:  EXAM UNDER ANESTHESIA; HYSTERECTOMY LAPAROSCOPIC TOTAL (LTH) W/ ROBOTICS, BILATERAL SALPINGECTOMY. (Abdomen)  CYSTOSCOPY (Bladder)    Relevant Problems   ENDO   (+) Acquired hypothyroidism   (+) Hypothyroidism due to Hashimoto's thyroiditis      HEMATOLOGY   (+) Iron (Fe) deficiency anemia      NEURO/PSYCH   (+) Mild depression        Physical Exam    Airway    Mallampati score: III  TM Distance: >3 FB  Neck ROM: full     Dental   No notable dental hx     Cardiovascular  Cardiovascular exam normal    Pulmonary  Pulmonary exam normal     Other Findings  post-pubertal.      Anesthesia Plan  ASA Score- 2     Anesthesia Type- general with ASA Monitors.         Additional Monitors:     Airway Plan: ETT.           Plan Factors-Exercise tolerance (METS): >4 METS.    Chart reviewed. EKG reviewed.  Existing labs reviewed. Patient summary reviewed.    Patient is a current smoker.  Patient did not smoke on day of surgery.            Induction- intravenous.    Postoperative Plan- Plan for postoperative opioid use.         Informed Consent- Anesthetic plan and risks discussed with patient.  I personally reviewed this patient with the CRNA. Discussed and agreed on the Anesthesia Plan with the CRNA..

## 2024-12-16 NOTE — OP NOTE
OPERATIVE REPORT  PATIENT NAME: Michaela Ware    :  1985  MRN: [id number]  Pt Location: [facility] OR ROOM 02    SURGERY DATE: 2024    Surgeons and Role:     * Jaja Cardona MD - Primary     * Dean Martinez MD - Assisting     * Myrna Montemayor MD - Assisting     * Chely Overton PA-C - Assisting     * Myrna Joseph MD - Assisting    Preop Diagnosis:  Menorrhagia with regular cycle [N92.0]  Fibroids [D21.9]  Pelvic pain [R10.2]  Dysmenorrhea [N94.6]    Post-Op Diagnosis Codes:     * Menorrhagia with regular cycle [N92.0]     * Fibroids [D21.9]     * Pelvic pain [R10.2]     * Dysmenorrhea [N94.6]    Procedure(s):  EXAM UNDER ANESTHESIA; HYSTERECTOMY LAPAROSCOPIC TOTAL (LTH) W/ ROBOTICS. EXTENSIVE LYSIS OF ADHESIONS. LEFT SALPINGO-OPHORECTOMY. RIGHT SALPINGECTOMY.  CYSTOSCOPY    Specimen(s):  ID Type Source Tests Collected by Time Destination   1 : Uterus, Cervix, B/L fallopian tubes, and left ovary Tissue Uterus TISSUE EXAM Jaja Cardona MD 2024 1111        Estimated Blood Loss:   100 mL    Drains:  [REMOVED] NG/OG/Enteral Tube Orogastric 18 Fr Center mouth (Removed)   Number of days: 0       [REMOVED] Urethral Catheter Non-latex 16 Fr. (Removed)   Site Assessment Clean;Skin intact 24 1332   Number of days: 0       Anesthesia Type:   General    Operative Indications:  Menorrhagia with regular cycle [N92.0]  Fibroids [D21.9]  Pelvic pain [R10.2]  Dysmenorrhea [N94.6]      Operative Findings:  1.  Normal external genitalia.  No lacerations, no ulcerations, no lesions visualized.  Bimanual exam revealed uterus anteverted in position, with bulky contour measuring about 10 weeks in size, no adnexal masses palpated bilaterally.  2.  Laparoscopic evaluation revealed extensive adhesive disease within the posterior cul-de-sac.  Uterus bulky in appearance with posterior fibroid visualized in a subserosal position.  No adhesive disease was noted in the anterior cul-de-sac near the  bladder.  Posteriorly adhesive disease was notable throughout the posterior cul-de-sac encompassing bilateral ovaries, bilateral tubes, with filmy adhesions to the sigmoid colon consistent with an obliterated posterior cul-de-sac and consistent with a diagnosis of stage IV endometriosis.  3.  Uterus was sounded to 9 cm with placement of an 8 cm Ana Rosa tip for uterine manipulation.  Vaginal vault was normal in appearance.  4.  Extensive lysis of adhesions was performed including evaluation of the retroperitoneal space along the left pelvic sidewall to visualize the ureter and allow for dissection and removal of the left ovary and left fallopian tube.  Once bilateral ovaries were dissected away from uterus, adhesion between the rectum to the posterior peritoneal serosa was visualized between the uterosacral ligaments.  Due to the close proximity to the colpotomy surgical dissection site, intraoperative consultation was placed to general surgery as well as GYN oncology to visualize dissection near the rectum.  Please see Dr. Martinez's note for further details of his evaluation.  GYN oncologist, Dr. Myrna Montemayor, also provided intraoperative consultation for evaluation of dissection of the uterine arteries at the colpotomy superior to the site of the adhesions encompassing the rectum.  A total hysterectomy was able to be performed without further dissection and no bowel injury or ureteral injury was noted.  Due to extensive lysis of adhesions being needed to perform the surgery, and additional 2.5 hours was needed to complete the procedure.  5.  Cystoscopy was performed at the conclusion of the procedure to evaluate the bladder.  Bilateral ureteral jets were visualized and no defect or injury to the bladder was visualized.    Complications:   None    Procedure and Technique:  Patient was identified in the holding area and brought back to the operating room where general anesthesia was initiated. Bilateral lower extremity  compression boots were placed prior to initiation of anesthesia. Patient was placed in the dorsal lithotomy position and prepped and draped in a sterile fashion.  Timeout procedure was completed.  Exam under anesthesia was completed and above findings noted.    A speculum and farooq were placed into the vagina for visualization of the cervix.  A single toothed tenaculum was used to grasp the anterior lip of the cervix.  The uterus was sounded to 9cm and the cervix was dilated to accommodate the insertion of the uterine manipulator. Bilateral stay sutures were placed laterally on the cervix and attached to the ERIN manipulator after insertion.  The vaginal occluder was inflated with 60mL of fluid. Pinto catheter was inserted into the bladder.    Attention was then turned to the abdomen. Two penetrating towel clamps were utilized to elevate the umbilicus. The Veress needle attached to gas was inserted into the umbilicus to achieve pneumoperitoneum.  Pneumoperitoneum was achieved to a maximum gas pressure of 15 mmHg.  Supraumbilical vertical 8mm incision was made for placement of the endoscopic port.  The port was placed without difficulty.  The endoscope was inserted through the port and the Veress needle was visualized and removed.  Two 8mm ports were placed on the patient's left side under direct laparoscopic visualization.  Then two more ports were placed on the right one 8mm and one 12mm for the assistant port. Laparoscopic evaluation was performed to find the above findings.     The robot was docked without difficulty with verification of proper arm placement.    The anatomy was inspected. The above findings were noted.  Attention was directed first to the left side.  The fallopian tube was grasped an elevated.  Using a combination of bipolar and monopolar cautery the left mesosalpinx was cauterized and cut.  Lysis of adhesions was performed at this time to mobilize the left ovary.  The retroperitoneum was  dissected to confirm placement of the ureter due to lack of visualization along the medial leaf of the broad ligament secondary to the adhesive disease in the posterior cul-de-sac.  The ureter was visualized.  A window was created to allow for cauterization of the infundibulopelvic ligament x 2 which was then cut for removal of the left adnexa.  Attention was turned to the right adnexa.  The right fallopian tube was elevated and utilizing a combination of bipolar and monopolar cautery the right mesosalpinx was cauterized and cut for removal of the right fallopian tube.  Lysis of adhesions was also performed with the right adnexa away from the right lateral wall of the uterus for visualization of the right utero-ovarian ligament.  The utero-ovarian ligament was cauterized and cut.  The right ureter was easily visualized along the right pelvic sidewall along the medial leaf of the broad ligament and well out of the surgical field.     The right round ligament was identified cauterized and cut.  The posterior leaf of the broad ligament was incised. The left uteroovarian ligament was cauterized and cut to dissect the left ovary and left fallopian tube in its entirety from the pelvic sidewall and the uterus for better visualization of the uterine arteries and the Koh cup.  The anterior leaf of the broad ligament was dissected to the vesicouterine peritoneum and a bladder flap was created.  The bladder flap was gently dissected off the lower uterine segment and cervix below the level of the Koh cervical cup. Evaluation of the posterior surgical field near the colpotomy was performed which led to intraoperative consultations from general surgery, Dr. Martinez, and GYN oncology, Dr. Montemayor.  Please refer to the operative note from general surgery for further details.  After evaluation by GYN oncology, the adhesions that were involving the rectum were well out of the surgical field to allow for the colpotomy to occur.    The  uterine vessels were skeletonized, cauterized and cut. The same procedure was followed on the patient's right side.      Once both sets of uterine arteries were cauterized and cut attention then turned to the colpotomy.  Using monopolar cautery the colpotomy was completed with out difficulty.The specimen was removed from the pelvis and sent to pathology.     A v-lock suture was introduced through the vagina and grasped with a needle . The vaginal cuff was closed with a running suture with good hemostasis.     The pelvis was irrigated. Good hemostasis was verified.    The robotic instruments were removed.  The robot was undocked and the robotic arms were then moved away from the patient.  The 12mm ports were removed and the fascia was closed.  The gas was allowed to escape and all other ports were removed.      Attention then turned to the perineum.  Cystoscopy was completed.  Bilateral ureteral jets were visualized.  The bladder was intact and free of suture.    The abdominal incisions were closed using 4-0 Monocryl and histocryl.    The vaginal cuff was visualized from below and good hemostasis was assured. All instruments were removed from the vagina. The patient was extubated and brought to the recovery room in stable condition. All sponge, lap and needle counts were correct.      I was present for the entire procedure.    Patient Disposition:  PACU  and extubated and stable      SIGNATURE: Jaja Cardona MD  DATE: December 16, 2024  TIME: 2:29 PM

## 2024-12-16 NOTE — ANESTHESIA POSTPROCEDURE EVALUATION
Post-Op Assessment Note    CV Status:  Stable    Pain management: adequate       Mental Status:  Awake and sleepy   Hydration Status:  Euvolemic   PONV Controlled:  Controlled   Airway Patency:  Patent     Post Op Vitals Reviewed: Yes    No anethesia notable event occurred.    Staff: CRNA           Last Filed PACU Vitals:  Vitals Value Taken Time   Temp 98.3 °F (36.8 °C) 12/16/24 1359   Pulse 108 12/16/24 1359   /82 12/16/24 1359   Resp 12 12/16/24 1359   SpO2 100 % 12/16/24 1359       Modified [place]:  No data recorded

## 2024-12-16 NOTE — PERIOPERATIVE NURSING NOTE
Patient is alert and awake; no distress noted.  Patient reports that 4/10 pain in lower abdomen is a comfortable pain level after medication administration; patient declines further interventions at this time.  Abdominal incisional sites (x5) port remain clean, dry, and intact; no drainage noted.  Minimal vaginal drainage (red coloration) noted on barrett pad.  Patient tolerated PO liquids and solids.  AVS and medication detail reviewed with patient and patient's  (Chester). Both verbalized understanding of the education provided; both stated that they had no further questions at this time.  Patient discharged via wheelchair accompanied by , mother, and this RN (author).

## 2024-12-16 NOTE — OP NOTE
OPERATIVE REPORT  PATIENT NAME: Michaela Ware    :  1985  MRN: [id number]  Pt Location: [facility] OR ROOM 02    SURGERY DATE: 2024    Surgeons and Role:     * Jaja Cardona MD - Primary     * Dean Martinez MD - Assisting     * Myrna Montemayor MD - Assisting     * Chely Overton PA-C - Assisting     * Myrna Joseph MD - Assisting    Preop Diagnosis:  Menorrhagia with regular cycle [N92.0]  Fibroids [D21.9]  Pelvic pain [R10.2]  Dysmenorrhea [N94.6]    Post-Op Diagnosis Codes:     * Menorrhagia with regular cycle [N92.0]     * Fibroids [D21.9]     * Pelvic pain [R10.2]     * Dysmenorrhea [N94.6]    Procedure(s):  EXAM UNDER ANESTHESIA; HYSTERECTOMY LAPAROSCOPIC TOTAL (LTH) W/ ROBOTICS. EXTENSIVE LYSIS OF ADHESIONS. LEFT SALPINGO-OPHORECTOMY. RIGHT SALPINGECTOMY.  CYSTOSCOPY    Specimen(s):  ID Type Source Tests Collected by Time Destination   1 : Uterus, Cervix, B/L fallopian tubes, and left ovary Tissue Uterus TISSUE EXAM Jaja Cardona MD 2024 11:11 AM        Estimated Blood Loss:   100 mL    Drains:  [REMOVED] NG/OG/Enteral Tube Orogastric 18 Fr Center mouth (Removed)   Number of days: 0       [REMOVED] Urethral Catheter Non-latex 16 Fr. (Removed)   Site Assessment Clean;Skin intact 24 1332   Number of days: 0       Anesthesia Type:   General    Operative Indications:  Menorrhagia with regular cycle [N92.0]  Fibroids [D21.9]  Pelvic pain [R10.2]  Dysmenorrhea [N94.6]    Operative Findings:  On arrival, anterior cervico-vaginal junction dissected and uterine vessels skeletonized. Rectum tethered to level of utero-sacrals. Concern for adhesion of rectum to koh ring after extensive lysis of adhesions and encountering of endometriosis. Left retoperitoneum opened and ureter visualized.     Complications:   None    Procedure and Technique:  On arrival, The rectum was noted to be tethered to utero-sacrals. Bilateral uterine arteries and veins were cauterized and divided at  the level of the koh ring. Anterior colpotomy was made nad carried posteriorly where colpotomy was made just superior to meghana ring to avoid dissection bed and rectum. This allowed completely amputation of the specimen. Posterior vaginal cuff was inspected with intact rectum with enough distance remaining for closure.     Case was turned back over to Dr. Cardona for closure.   A co-surgeon was required because of skills and techniques relevant to speciality.    Patient Disposition:  PACU     SIGNATURE: Myrna Montemayor MD  DATE: December 16, 2024  TIME: 6:25 PM

## 2024-12-19 ENCOUNTER — RESULTS FOLLOW-UP (OUTPATIENT)
Dept: OBGYN CLINIC | Facility: CLINIC | Age: 39
End: 2024-12-19

## 2024-12-19 PROCEDURE — 88307 TISSUE EXAM BY PATHOLOGIST: CPT | Performed by: STUDENT IN AN ORGANIZED HEALTH CARE EDUCATION/TRAINING PROGRAM

## 2025-01-03 ENCOUNTER — OFFICE VISIT (OUTPATIENT)
Dept: OBGYN CLINIC | Facility: CLINIC | Age: 40
End: 2025-01-03

## 2025-01-03 VITALS
DIASTOLIC BLOOD PRESSURE: 82 MMHG | BODY MASS INDEX: 30.91 KG/M2 | SYSTOLIC BLOOD PRESSURE: 118 MMHG | HEIGHT: 62 IN | WEIGHT: 168 LBS

## 2025-01-03 DIAGNOSIS — N80.9 ENDOMETRIOSIS DETERMINED BY LAPAROSCOPY: ICD-10-CM

## 2025-01-03 DIAGNOSIS — Z90.710 STATUS POST TOTAL HYSTERECTOMY: Primary | ICD-10-CM

## 2025-01-03 DIAGNOSIS — Z90.79 STATUS POST BILATERAL SALPINGECTOMY: ICD-10-CM

## 2025-01-03 DIAGNOSIS — L71.9 ROSACEA: ICD-10-CM

## 2025-01-03 DIAGNOSIS — Z90.721 STATUS POST LEFT OOPHORECTOMY: ICD-10-CM

## 2025-01-03 PROCEDURE — 99024 POSTOP FOLLOW-UP VISIT: CPT | Performed by: OBSTETRICS & GYNECOLOGY

## 2025-01-03 RX ORDER — NORETHINDRONE ACETATE AND ETHINYL ESTRADIOL, ETHINYL ESTRADIOL AND FERROUS FUMARATE 1MG-10(24)
1 KIT ORAL DAILY
Qty: 84 TABLET | Refills: 1 | Status: SHIPPED | OUTPATIENT
Start: 2025-01-03

## 2025-01-03 NOTE — PROGRESS NOTES
Assessment   Michaela was seen today for post-op.    Diagnoses and all orders for this visit:    Status post total hysterectomy    Status post bilateral salpingectomy    Status post left oophorectomy    Endometriosis determined by laparoscopy  -     Norethin-Eth Estrad-Fe Biphas (Lo Loestrin Fe) 1 MG-10 MCG / 10 MCG TABS; Take 1 tablet by mouth in the morning      Reviewed pathology as well as operative findings specifically related to her endometriosis found during her surgical procedure.  We discussed starting combined OCPs for ovulation suppression for her remaining ovary.  Discussed that we still want her ovary to function and avoid early menopause, but suppress ovulation to control any further adhesive disease secondary to endometriosis.  Will follow-up on toleration of low Loestrin at her next postoperative visit.  Doing well postoperatively.    Plan    1. Continue any current medications.  2. Activity restrictions:  nothing in the vagina  3. RTO in 4 weeks for second postoperative visit.         Subjective    Michaela Ware is a 39 y.o.  female who presents status post exam under anesthesia, robotic assisted total laparoscopic hysterectomy, cystoscopy, right salpingectomy, left salpingo-oopherectomy, and lysis of adhesions on 2024 for abnormal uterine bleeding, pelvic pain, and endometriosis .     Eating a regular diet without difficulty.   Bowel movements are normal.   Pain is controlled without any medications.    Operative findings, photos and pathology discussed with patient.     The following portions of the patient's history were reviewed and updated as appropriate: allergies, current medications, past family history, past medical history, past social history, past surgical history, and problem list.    Allergies:  Peanut oil - food allergy and Peppermint oil - food allergy    Current Outpatient Medications:     acetaminophen (TYLENOL) 650 mg CR tablet, Take 1 tablet (650 mg total) by mouth  "every 8 (eight) hours as needed for mild pain, Disp: 30 tablet, Rfl: 0    ibuprofen (MOTRIN) 600 mg tablet, Take 1 tablet (600 mg total) by mouth every 6 (six) hours as needed for mild pain, Disp: 60 tablet, Rfl: 0    methylphenidate (CONCERTA) 18 mg ER tablet, Take 18 mg by mouth every morning, Disp: , Rfl:     Norethin-Eth Estrad-Fe Biphas (Lo Loestrin Fe) 1 MG-10 MCG / 10 MCG TABS, Take 1 tablet by mouth in the morning, Disp: 84 tablet, Rfl: 1    Synthroid 75 MCG tablet, TAKE ONE TABLET BY MOUTH EVERY DAY IN THE EARLY MORNING, Disp: 90 tablet, Rfl: 1      Review of Systems  Review of Systems   Constitutional:  Negative for chills, diaphoresis and fever.   Respiratory:  Negative for shortness of breath.    Cardiovascular:  Negative for chest pain.   Gastrointestinal:  Negative for abdominal pain, constipation, diarrhea, nausea and vomiting.   Genitourinary:  Positive for flank pain and pelvic pain. Negative for difficulty urinating, menstrual problem, vaginal bleeding, vaginal discharge and vaginal pain.        Pressure with urination   Musculoskeletal:  Negative for back pain.   Neurological:  Negative for dizziness, weakness and headaches.          Objective   /82 (BP Location: Left arm, Patient Position: Sitting, Cuff Size: Standard)   Ht 5' 2\" (1.575 m)   Wt 76.2 kg (168 lb)   LMP 12/03/2024 (Exact Date)   BMI 30.73 kg/m²     Physical Exam  Constitutional:       General: She is not in acute distress.     Appearance: Normal appearance. She is not diaphoretic.   Genitourinary:      Vulva normal.      Right Labia: No rash, tenderness or lesions.     Left Labia: No tenderness, lesions or rash.     Vaginal cuff intact.     Perineal sutures intact.     No vaginal discharge, erythema, tenderness, bleeding, granulation tissue or cuff induration.      Cervix is absent.      Uterus is absent.   HENT:      Head: Normocephalic and atraumatic.   Pulmonary:      Effort: Pulmonary effort is normal. No respiratory " distress.   Musculoskeletal:      Right lower leg: No edema.      Left lower leg: No edema.   Neurological:      Mental Status: She is alert and oriented to person, place, and time.   Skin:     General: Skin is warm and dry.      Coloration: Skin is not pale.   Psychiatric:         Mood and Affect: Mood normal.         Behavior: Behavior normal.         Thought Content: Thought content normal.         Judgment: Judgment normal.   Vitals and nursing note reviewed.

## 2025-01-17 DIAGNOSIS — L71.9 ROSACEA: ICD-10-CM

## 2025-01-20 ENCOUNTER — PATIENT MESSAGE (OUTPATIENT)
Dept: DERMATOLOGY | Facility: CLINIC | Age: 40
End: 2025-01-20

## 2025-01-20 RX ORDER — METHYLPHENIDATE HYDROCHLORIDE 18 MG/1
18 TABLET ORAL EVERY MORNING
Qty: 30 TABLET | Refills: 0 | Status: CANCELLED | OUTPATIENT
Start: 2025-01-20

## 2025-01-20 NOTE — TELEPHONE ENCOUNTER
Patient last office visit 10/25/2023.   Scheduled patient for this Thursday with Trevor. No changes with her Rosacea symptoms just needs a refill.

## 2025-01-23 ENCOUNTER — OFFICE VISIT (OUTPATIENT)
Dept: DERMATOLOGY | Facility: CLINIC | Age: 40
End: 2025-01-23
Payer: COMMERCIAL

## 2025-01-23 VITALS — HEIGHT: 62 IN | TEMPERATURE: 98.4 F | WEIGHT: 168 LBS | BODY MASS INDEX: 30.91 KG/M2

## 2025-01-23 DIAGNOSIS — L71.9 ROSACEA: Primary | ICD-10-CM

## 2025-01-23 PROCEDURE — 99214 OFFICE O/P EST MOD 30 MIN: CPT | Performed by: NURSE PRACTITIONER

## 2025-01-23 RX ORDER — DOXYCYCLINE 100 MG/1
CAPSULE ORAL
Qty: 60 CAPSULE | Refills: 2 | Status: SHIPPED | OUTPATIENT
Start: 2025-01-23 | End: 2025-04-23

## 2025-01-23 NOTE — PROGRESS NOTES
"[facility] Dermatology Clinic Note     Patient Name: Michaela Ware  Encounter Date: 1/23/2025     Have you been cared for by a [facility] Dermatologist in the last 3 years and, if so, which description applies to you?    Yes.  I have been here within the last 3 years, and my medical history has NOT changed since that time.  I am FEMALE/of child-bearing potential.    REVIEW OF SYSTEMS:  Have you recently had or currently have any of the following? No changes in my recent health.   PAST MEDICAL HISTORY:  Have you personally ever had or currently have any of the following?  If \"YES,\" then please provide more detail. No changes in my medical history.   HISTORY OF IMMUNOSUPPRESSION: Do you have a history of any of the following:  Systemic Immunosuppression such as Diabetes, Biologic or Immunotherapy, Chemotherapy, Organ Transplantation, Bone Marrow Transplantation or Prednisone?  No     Answering \"YES\" requires the addition of the dotphrase \"IMMUNOSUPPRESSED\" as the first diagnosis of the patient's visit.   FAMILY HISTORY:  Any \"first degree relatives\" (parent, brother, sister, or child) with the following?    No changes in my family's known health.   PATIENT EXPERIENCE:    Do you want the Dermatologist to perform a COMPLETE skin exam today including a clinical examination under the \"bra and underwear\" areas?  NO  If necessary, do we have your permission to call and leave a detailed message on your Preferred Phone number that includes your specific medical information?  Yes      Allergies   Allergen Reactions    Peanut Oil - Food Allergy Cough and Other (See Comments)     oral    Peppermint Oil - Food Allergy Cough, Hives, Itching, Other (See Comments), Rash and Swelling     Sneezing.        Current Outpatient Medications:     acetaminophen (TYLENOL) 650 mg CR tablet, Take 1 tablet (650 mg total) by mouth every 8 (eight) hours as needed for mild pain, Disp: 30 tablet, Rfl: 0    ibuprofen (MOTRIN) 600 mg tablet, Take 1 " tablet (600 mg total) by mouth every 6 (six) hours as needed for mild pain, Disp: 60 tablet, Rfl: 0    methylphenidate (CONCERTA) 18 mg ER tablet, Take 18 mg by mouth every morning, Disp: , Rfl:     metroNIDAZOLE (METROCREAM) 0.75 % cream, APPLY TOPICALLY 2 TIMES A DAY. APPLY 1 TO 2 TIMES A DAY TO FACE FOR ROSACEA, Disp: 45 g, Rfl: 3    Norethin-Eth Estrad-Fe Biphas (Lo Loestrin Fe) 1 MG-10 MCG / 10 MCG TABS, Take 1 tablet by mouth in the morning, Disp: 84 tablet, Rfl: 1    Synthroid 75 MCG tablet, TAKE ONE TABLET BY MOUTH EVERY DAY IN THE EARLY MORNING, Disp: 90 tablet, Rfl: 1          Whom besides the patient is providing clinical information about today's encounter?   NO ADDITIONAL HISTORIAN (patient alone provided history)    Physical Exam and Assessment/Plan by Diagnosis:    ROSACEA f/u    Physical Exam:  Anatomic Location Affected:  Cheeks  Morphological Description:  Background centrofacial erythema, more predominant on right cheek, and mild visible telangiectasias; no obvious papules or pustules at this time     Additional History of Present Condition:  Patient presents for rosacea f/u; last seen by Dr. Silveira in October 2023. She has a hx of Hashimoto's thyroiditis and Sjogren's; prior KYLIE's have been negative; she does have a pending KYLIE blood test that she has not completed yet. She has been using metronidazole 0.75% cream twice daily as maintenance. She reports overall good control of symptoms, but noticed more recently that she has been flaring. She states her flares do not last long and typically resolve quickly. She does get little bumps/ redness and flushing on cheeks, chin and nose when flaring. She has never taken anything for acute flares in the past.     Duration: 20 years   Symptoms: redness and bumps on cheeks  Triggers: stress, heat  Treatments attempted: metronidazole cream   Ocular symptoms? (Frequent styes, foreign body sensation in eyes, redness/irritation): No    Assessment and  "Plan:  History and physical most consistent with rosacea  Educated that rosacea is a chronic condition affecting the mid-face (nose, cheeks, chin, forehead, eyelids)  Educated that rosacea often presents as redness, telangiectasias, papules and pustules  Discussed that the underlying cause of rosacea is complex, including genetic, environmental, vascular and inflammatory factors  Discussed that there are various triggers to rosacea and that keeping a symptom journal can be helpful in identifying those triggers that make rosacea worse as some are patient-specific  Educated that UV exposure is a universal trigger and recommended use of daily SPF. Specifically recommended a physical blocker SPF to reduce the risk of flaring secondary to irritation from chemical blocker.  Educated on the use of green tinted makeup to mask red coloration  Discussed treatment ladder including trigger avoidance, SPF use, topical treatment (metronidazole, ivermectin, azelaic acid, brimonidine gel, pimecrolimus), oral treatment options (short courses of oral antibiotics with sub-microbial dosing possible), laser treatment  Based on a thorough discussion of this condition and the management approach to it (including a comprehensive discussion of the known risks, side effects and potential benefits of treatment), the patient (family) agrees to implement the following specific plan:  Recommend SPF daily (look for sunscreens that list ONLY zinc oxide and titanium dioxide under \"active ingredients\")  Transition to gentle skin care products (ideally fragrance free). Cerave, cetaphil, vanicream are good brands for this  Start journal to track any triggers and attempt to avoid all triggers  Continue with metronidazole cream as directed   Take doxycycline twice a day with food and water when flaring - may discontinue when symptoms are controlled and/or resolved; may repeat again when flaring, if needed   F/u as needed; if flaring is more frequent, " contact office for additional eval and additional symptom management         Scribe Attestation      I,:  Barber Kaminski am acting as a scribe while in the presence of the attending physician.:       I,:  PEDRO Raphael personally performed the services described in this documentation    as scribed in my presence.:             Health Care Proxy (HCP)

## 2025-01-23 NOTE — PATIENT INSTRUCTIONS
"ROSACEA> SLE facial rash. KYLIE negative in 2019. Sjogren's and Hashimoto's thyroiditis history       Assessment and Plan:  History and physical most consistent with rosacea  Educated that rosacea is a chronic condition affecting the mid-face (nose, cheeks, chin, forehead, eyelids)  Educated that rosacea often presents as redness, telangiectasias, papules and pustules  Discussed that the underlying cause of rosacea is complex, including genetic, environmental, vascular and inflammatory factors  Discussed that there are various triggers to rosacea and that keeping a symptom journal can be helpful in identifying those triggers that make rosacea worse as some are patient-specific  Educated that UV exposure is a universal trigger and recommended use of daily SPF. Specifically recommended a physical blocker SPF to reduce the risk of flaring secondary to irritation from chemical blocker.  Educated on the use of green tinted makeup to mask red coloration  Discussed treatment ladder including trigger avoidance, SPF use, topical treatment (metronidazole, ivermectin, azelaic acid, brimonidine gel, pimecrolimus), oral treatment options (short courses of oral antibiotics with sub-microbial dosing possible), laser treatment  Based on a thorough discussion of this condition and the management approach to it (including a comprehensive discussion of the known risks, side effects and potential benefits of treatment), the patient (family) agrees to implement the following specific plan:  Start use of SPF daily (look for sunscreens that list ONLY zinc oxide and titanium dioxide under \"active ingredients\")  Transition to gentle skin care products (ideally fragrance free). Cerave, cetaphil, vanicream are good brands for this.  Start journal to track any triggers and attempt to avoid all triggers   Continue with metro cream as needed  Take doxycycline twice a day as needed during flares with food and water.    Continue to monitor reach out " to Dermatology as needed

## 2025-01-27 ENCOUNTER — OFFICE VISIT (OUTPATIENT)
Dept: OBGYN CLINIC | Facility: CLINIC | Age: 40
End: 2025-01-27

## 2025-01-27 VITALS
WEIGHT: 170 LBS | SYSTOLIC BLOOD PRESSURE: 120 MMHG | BODY MASS INDEX: 31.28 KG/M2 | HEIGHT: 62 IN | DIASTOLIC BLOOD PRESSURE: 82 MMHG

## 2025-01-27 DIAGNOSIS — R10.2 PELVIC PAIN: ICD-10-CM

## 2025-01-27 DIAGNOSIS — Z90.721 STATUS POST LEFT OOPHORECTOMY: ICD-10-CM

## 2025-01-27 DIAGNOSIS — N80.9 ENDOMETRIOSIS DETERMINED BY LAPAROSCOPY: ICD-10-CM

## 2025-01-27 DIAGNOSIS — Z90.710 STATUS POST TOTAL HYSTERECTOMY: Primary | ICD-10-CM

## 2025-01-27 DIAGNOSIS — Z90.79 STATUS POST BILATERAL SALPINGECTOMY: ICD-10-CM

## 2025-01-27 PROCEDURE — 99024 POSTOP FOLLOW-UP VISIT: CPT | Performed by: OBSTETRICS & GYNECOLOGY

## 2025-01-27 RX ORDER — DESOGESTREL AND ETHINYL ESTRADIOL 0.15-0.03
1 KIT ORAL DAILY
Qty: 84 TABLET | Refills: 1 | Status: SHIPPED | OUTPATIENT
Start: 2025-01-27

## 2025-01-27 NOTE — PROGRESS NOTES
Assessment   Michaela was seen today for post-op.    Diagnoses and all orders for this visit:    Endometriosis determined by laparoscopy  -     desogestrel-ethinyl estradiol (APRI) 0.15-30 MG-MCG per tablet; Take 1 tablet by mouth daily    Pelvic pain  -     desogestrel-ethinyl estradiol (APRI) 0.15-30 MG-MCG per tablet; Take 1 tablet by mouth daily        Doing well postoperatively.    Plan    1. Continue any current medications.  2. Activity restrictions: none  3. RTO for annual gyn exam  4. Discussed mood changes occurring recently with menstrual cycle. Will have her discontinue her current OCPs, to see if there is an improvement in her mood. Will start a new OCP, Apri, to see if new progesterone will help with her mood changes and her instrusive thoughts.    - rx provided for Apri. Discussed recommendation for continued menstrual suppression due to endometriosis.       Subjective    Michaela Ware is a 40 y.o.  female who presents status post exam under anesthesia, robotic assisted total laparoscopic hysterectomy, cystoscopy, right salpingectomy, and left salpingo-oopherectomy on 2024 for abnormal uterine bleeding, pelvic pain, and endometriosis .     Eating a regular diet without difficulty.   Bowel movements are normal.   The patient is not having any pain.    Operative findings, photos and pathology discussed with patient.     The following portions of the patient's history were reviewed and updated as appropriate: allergies, current medications, past family history, past medical history, past social history, past surgical history, and problem list.    Allergies:  Peanut oil - food allergy and Peppermint oil - food allergy    Current Outpatient Medications:     desogestrel-ethinyl estradiol (APRI) 0.15-30 MG-MCG per tablet, Take 1 tablet by mouth daily, Disp: 84 tablet, Rfl: 1    ibuprofen (MOTRIN) 600 mg tablet, Take 1 tablet (600 mg total) by mouth every 6 (six) hours as needed for mild pain,  "Disp: 60 tablet, Rfl: 0    metroNIDAZOLE (METROCREAM) 0.75 % cream, APPLY TOPICALLY 2 TIMES A DAY. APPLY 1 TO 2 TIMES A DAY TO FACE FOR ROSACEA, Disp: 45 g, Rfl: 3    Synthroid 75 MCG tablet, TAKE ONE TABLET BY MOUTH EVERY DAY IN THE EARLY MORNING, Disp: 90 tablet, Rfl: 1    acetaminophen (TYLENOL) 650 mg CR tablet, Take 1 tablet (650 mg total) by mouth every 8 (eight) hours as needed for mild pain, Disp: 30 tablet, Rfl: 0    doxycycline monohydrate (MONODOX) 100 mg capsule, Start taking twice daily with food and water WHEN FLARING; discontinue when flare is controlled and/or resolved. May re-start again when flaring. (Patient not taking: Reported on 1/27/2025), Disp: 60 capsule, Rfl: 2    methylphenidate (CONCERTA) 18 mg ER tablet, Take 18 mg by mouth every morning (Patient not taking: Reported on 1/27/2025), Disp: , Rfl:       Review of Systems  Review of Systems   Constitutional:  Negative for chills, diaphoresis and fever.   Respiratory:  Negative for shortness of breath.    Cardiovascular:  Negative for chest pain.   Gastrointestinal:  Negative for abdominal pain, diarrhea, nausea and vomiting.   Genitourinary:  Positive for menstrual problem and vaginal discharge. Negative for difficulty urinating, pelvic pain, vaginal bleeding and vaginal pain.   Neurological:  Negative for dizziness and weakness.   Psychiatric/Behavioral:  Positive for agitation and dysphoric mood (intrusive thoughts). Negative for suicidal ideas.           Objective   /82   Ht 5' 2\" (1.575 m)   Wt 77.1 kg (170 lb)   LMP 12/03/2024 (Exact Date)   BMI 31.09 kg/m²     Physical Exam  Constitutional:       General: She is not in acute distress.     Appearance: Normal appearance. She is normal weight. She is not diaphoretic.   Genitourinary:      Vulva normal.      No lesions in the vagina.      Right Labia: No rash, tenderness or lesions.     Left Labia: No tenderness, lesions or rash.     Vaginal cuff intact.     Perineal sutures " intact.     No vaginal discharge, erythema, tenderness, bleeding, granulation tissue or cuff induration.      Cervix is absent.      Uterus is absent.   HENT:      Head: Normocephalic and atraumatic.   Pulmonary:      Effort: Pulmonary effort is normal. No respiratory distress.   Musculoskeletal:      Right lower leg: No edema.      Left lower leg: No edema.   Neurological:      Mental Status: She is alert and oriented to person, place, and time.   Skin:     General: Skin is warm and dry.      Coloration: Skin is not pale.   Psychiatric:         Mood and Affect: Mood normal.         Behavior: Behavior normal.         Thought Content: Thought content normal.         Judgment: Judgment normal.   Vitals and nursing note reviewed.

## 2025-03-18 DIAGNOSIS — E06.3 HYPOTHYROIDISM DUE TO HASHIMOTO'S THYROIDITIS: ICD-10-CM

## 2025-03-18 RX ORDER — LEVOTHYROXINE SODIUM 75 MCG
TABLET ORAL
Qty: 90 TABLET | Refills: 1 | Status: SHIPPED | OUTPATIENT
Start: 2025-03-18 | End: 2025-03-26

## 2025-03-21 ENCOUNTER — APPOINTMENT (OUTPATIENT)
Dept: LAB | Facility: CLINIC | Age: 40
End: 2025-03-21
Payer: COMMERCIAL

## 2025-03-21 DIAGNOSIS — D51.9 ANEMIA DUE TO VITAMIN B12 DEFICIENCY, UNSPECIFIED B12 DEFICIENCY TYPE: ICD-10-CM

## 2025-03-21 DIAGNOSIS — Z13.9 SCREENING FOR UNSPECIFIED CONDITION: ICD-10-CM

## 2025-03-21 DIAGNOSIS — E78.5 HYPERLIPIDEMIA, UNSPECIFIED HYPERLIPIDEMIA TYPE: ICD-10-CM

## 2025-03-21 DIAGNOSIS — E55.9 VITAMIN D DEFICIENCY: ICD-10-CM

## 2025-03-21 DIAGNOSIS — E06.3 HYPOTHYROIDISM DUE TO HASHIMOTO'S THYROIDITIS: ICD-10-CM

## 2025-03-21 DIAGNOSIS — R79.89 HYPOURICEMIA: ICD-10-CM

## 2025-03-21 DIAGNOSIS — E55.9 AVITAMINOSIS D: ICD-10-CM

## 2025-03-21 LAB
25(OH)D3 SERPL-MCNC: 38.6 NG/ML (ref 30–100)
ALBUMIN SERPL BCG-MCNC: 4.2 G/DL (ref 3.5–5)
ALP SERPL-CCNC: 56 U/L (ref 34–104)
ALT SERPL W P-5'-P-CCNC: 8 U/L (ref 7–52)
ANION GAP SERPL CALCULATED.3IONS-SCNC: 8 MMOL/L (ref 4–13)
AST SERPL W P-5'-P-CCNC: 9 U/L (ref 13–39)
BASOPHILS # BLD AUTO: 0.03 THOUSANDS/ÂΜL (ref 0–0.1)
BASOPHILS NFR BLD AUTO: 1 % (ref 0–1)
BILIRUB SERPL-MCNC: 0.33 MG/DL (ref 0.2–1)
BUN SERPL-MCNC: 10 MG/DL (ref 5–25)
CALCIUM SERPL-MCNC: 9.1 MG/DL (ref 8.4–10.2)
CHLORIDE SERPL-SCNC: 106 MMOL/L (ref 96–108)
CHOLEST SERPL-MCNC: 226 MG/DL (ref ?–200)
CO2 SERPL-SCNC: 24 MMOL/L (ref 21–32)
CREAT SERPL-MCNC: 0.88 MG/DL (ref 0.6–1.3)
EOSINOPHIL # BLD AUTO: 0.14 THOUSAND/ÂΜL (ref 0–0.61)
EOSINOPHIL NFR BLD AUTO: 2 % (ref 0–6)
ERYTHROCYTE [DISTWIDTH] IN BLOOD BY AUTOMATED COUNT: 13.5 % (ref 11.6–15.1)
EST. AVERAGE GLUCOSE BLD GHB EST-MCNC: 126 MG/DL
FERRITIN SERPL-MCNC: 22 NG/ML (ref 11–307)
GFR SERPL CREATININE-BSD FRML MDRD: 81 ML/MIN/1.73SQ M
GLUCOSE P FAST SERPL-MCNC: 93 MG/DL (ref 65–99)
HBA1C MFR BLD: 6 %
HCT VFR BLD AUTO: 38.2 % (ref 34.8–46.1)
HDLC SERPL-MCNC: 43 MG/DL
HGB BLD-MCNC: 12.5 G/DL (ref 11.5–15.4)
IMM GRANULOCYTES # BLD AUTO: 0.01 THOUSAND/UL (ref 0–0.2)
IMM GRANULOCYTES NFR BLD AUTO: 0 % (ref 0–2)
IRON SATN MFR SERPL: 20 % (ref 15–50)
IRON SERPL-MCNC: 91 UG/DL (ref 50–212)
LDLC SERPL CALC-MCNC: 142 MG/DL (ref 0–100)
LYMPHOCYTES # BLD AUTO: 1.71 THOUSANDS/ÂΜL (ref 0.6–4.47)
LYMPHOCYTES NFR BLD AUTO: 28 % (ref 14–44)
MCH RBC QN AUTO: 28.2 PG (ref 26.8–34.3)
MCHC RBC AUTO-ENTMCNC: 32.7 G/DL (ref 31.4–37.4)
MCV RBC AUTO: 86 FL (ref 82–98)
MONOCYTES # BLD AUTO: 0.28 THOUSAND/ÂΜL (ref 0.17–1.22)
MONOCYTES NFR BLD AUTO: 5 % (ref 4–12)
NEUTROPHILS # BLD AUTO: 3.9 THOUSANDS/ÂΜL (ref 1.85–7.62)
NEUTS SEG NFR BLD AUTO: 64 % (ref 43–75)
NONHDLC SERPL-MCNC: 183 MG/DL
NRBC BLD AUTO-RTO: 0 /100 WBCS
PLATELET # BLD AUTO: 319 THOUSANDS/UL (ref 149–390)
PMV BLD AUTO: 10.6 FL (ref 8.9–12.7)
POTASSIUM SERPL-SCNC: 4 MMOL/L (ref 3.5–5.3)
PROT SERPL-MCNC: 7.4 G/DL (ref 6.4–8.4)
RBC # BLD AUTO: 4.44 MILLION/UL (ref 3.81–5.12)
SODIUM SERPL-SCNC: 138 MMOL/L (ref 135–147)
T4 FREE SERPL-MCNC: 0.82 NG/DL (ref 0.61–1.12)
TIBC SERPL-MCNC: 457.8 UG/DL (ref 250–450)
TRANSFERRIN SERPL-MCNC: 327 MG/DL (ref 203–362)
TRIGL SERPL-MCNC: 204 MG/DL (ref ?–150)
TSH SERPL DL<=0.05 MIU/L-ACNC: 4.72 UIU/ML (ref 0.45–4.5)
UIBC SERPL-MCNC: 367 UG/DL (ref 155–355)
VIT B12 SERPL-MCNC: 156 PG/ML (ref 180–914)
WBC # BLD AUTO: 6.07 THOUSAND/UL (ref 4.31–10.16)

## 2025-03-21 PROCEDURE — 82607 VITAMIN B-12: CPT

## 2025-03-21 PROCEDURE — 80061 LIPID PANEL: CPT

## 2025-03-21 PROCEDURE — 84439 ASSAY OF FREE THYROXINE: CPT

## 2025-03-21 PROCEDURE — 80053 COMPREHEN METABOLIC PANEL: CPT

## 2025-03-21 PROCEDURE — 82306 VITAMIN D 25 HYDROXY: CPT

## 2025-03-21 PROCEDURE — 83036 HEMOGLOBIN GLYCOSYLATED A1C: CPT

## 2025-03-21 PROCEDURE — 85025 COMPLETE CBC W/AUTO DIFF WBC: CPT

## 2025-03-21 PROCEDURE — 36415 COLL VENOUS BLD VENIPUNCTURE: CPT

## 2025-03-21 PROCEDURE — 84443 ASSAY THYROID STIM HORMONE: CPT

## 2025-03-24 ENCOUNTER — RESULTS FOLLOW-UP (OUTPATIENT)
Dept: FAMILY MEDICINE CLINIC | Facility: CLINIC | Age: 40
End: 2025-03-24

## 2025-03-24 ENCOUNTER — RESULTS FOLLOW-UP (OUTPATIENT)
Dept: ENDOCRINOLOGY | Facility: CLINIC | Age: 40
End: 2025-03-24

## 2025-03-25 ENCOUNTER — TELEPHONE (OUTPATIENT)
Dept: OTHER | Facility: OTHER | Age: 40
End: 2025-03-25

## 2025-03-25 NOTE — TELEPHONE ENCOUNTER
Patient is calling regarding cancelling an appointment.    Date/Time:3.25.25 at 0900    Patient was rescheduled: YES [] NO [x]    Patient requesting call back to reschedule: YES [] NO [x]       Patient states she will follow up with PCP.

## 2025-03-26 ENCOUNTER — OFFICE VISIT (OUTPATIENT)
Dept: ENDOCRINOLOGY | Facility: CLINIC | Age: 40
End: 2025-03-26
Payer: COMMERCIAL

## 2025-03-26 VITALS
HEART RATE: 73 BPM | SYSTOLIC BLOOD PRESSURE: 120 MMHG | DIASTOLIC BLOOD PRESSURE: 86 MMHG | BODY MASS INDEX: 31.1 KG/M2 | WEIGHT: 169 LBS | OXYGEN SATURATION: 99 % | HEIGHT: 62 IN

## 2025-03-26 DIAGNOSIS — E55.9 VITAMIN D DEFICIENCY: ICD-10-CM

## 2025-03-26 DIAGNOSIS — E06.3 HYPOTHYROIDISM DUE TO HASHIMOTO'S THYROIDITIS: Primary | ICD-10-CM

## 2025-03-26 DIAGNOSIS — R73.01 IFG (IMPAIRED FASTING GLUCOSE): ICD-10-CM

## 2025-03-26 PROCEDURE — 99214 OFFICE O/P EST MOD 30 MIN: CPT | Performed by: PHYSICIAN ASSISTANT

## 2025-03-26 RX ORDER — LEVOTHYROXINE SODIUM 88 MCG
88 TABLET ORAL DAILY
Qty: 90 TABLET | Refills: 1 | Status: SHIPPED | OUTPATIENT
Start: 2025-03-26

## 2025-03-26 RX ORDER — METFORMIN HYDROCHLORIDE 500 MG/1
500 TABLET, EXTENDED RELEASE ORAL
Qty: 90 TABLET | Refills: 1 | Status: SHIPPED | OUTPATIENT
Start: 2025-03-26

## 2025-03-26 NOTE — PROGRESS NOTES
Name: Michaela Ware      : 1985      MRN: [id number]  Encounter Provider: Marisol Mullins PA-C  Encounter Date: 3/26/2025   Encounter department: [facility] [place]    CC: hypothyroidism  Assessment & Plan  Hypothyroidism due to Hashimoto's thyroiditis  TSH elevated at 4.719 and free T4 normal at 0.82.  Increase Synthroid to 88 mcg daily  Repeat TFTs in 6 weeks and prior to next visit  Orders:    T4, free; Future    TSH, 3rd generation; Future    Synthroid 88 MCG tablet; Take 1 tablet (88 mcg total) by mouth daily    TSH + Free T4; Future    IFG (impaired fasting glucose)  A1c 6.0%  Start metformin 500 mg daily  Discussed SE, safety/efficacy  Recommended healthy diet and routine exercise as tolerated  Orders:    Hemoglobin A1C; Future    Basic metabolic panel; Future    metFORMIN (GLUCOPHAGE-XR) 500 mg 24 hr tablet; Take 1 tablet (500 mg total) by mouth daily with dinner    Vitamin D deficiency  Vitamin D is normal 38.6  Take vitamin D3 2000 IU daily           History of Present Illness     Michaela Ware is a 40 y.o. female here for follow-up of hypothyroidism secondary to Hashimoto's thyroiditis. Does have a medical history of Sjogren's syndrome, endometriosis and infertility. Patient is on Synthroid 75 mcg 1 tablet daily. Patient is taking it 1 hr before breakfast on an empty stomach and at least 4 hrs apart from supplements. Tolerating medication well. No history of external radiation to head/neck/chest. No family history of thyroid cancer. No recent Iodine loading in form of medication, biotin or kelp supplements or radiological diagnostic studies. Most recent TSH elevated at 4.719 and free T4 normal at 0.82.     IFG: PCP ordered a HGA1C which was elevated at 6.0%. She is eating healthy and exercising 3-5 times a week. Her exercise was limited in the past 3 months because of hysterectomy.      Vitamin D: vitamin D is normal at 38.6.  She was inconsistently taking  vitamin D with K2.       Review of Systems   Constitutional:  Positive for fatigue. Negative for activity change, appetite change and unexpected weight change.   HENT:  Negative for trouble swallowing.    Eyes:  Negative for visual disturbance.   Respiratory:  Negative for shortness of breath.    Cardiovascular:  Positive for palpitations. Negative for chest pain.   Gastrointestinal:  Negative for constipation and diarrhea.   Endocrine: Negative for cold intolerance and heat intolerance.   Musculoskeletal:  Positive for arthralgias.   Skin: Negative.    Neurological:  Negative for tremors.   Psychiatric/Behavioral: Negative.      as per HPI  Current Outpatient Medications on File Prior to Visit   Medication Sig Dispense Refill    desogestrel-ethinyl estradiol (APRI) 0.15-30 MG-MCG per tablet Take 1 tablet by mouth daily 84 tablet 1    methylphenidate (CONCERTA) 18 mg ER tablet Take 18 mg by mouth every morning (Patient taking differently: Take 18 mg by mouth every morning Taking sporadically)      metroNIDAZOLE (METROCREAM) 0.75 % cream APPLY TOPICALLY 2 TIMES A DAY. APPLY 1 TO 2 TIMES A DAY TO FACE FOR ROSACEA 45 g 3    Synthroid 75 MCG tablet TAKE ONE TABLET BY MOUTH EVERY DAY IN THE EARLY MORNING 90 tablet 1    acetaminophen (TYLENOL) 650 mg CR tablet Take 1 tablet (650 mg total) by mouth every 8 (eight) hours as needed for mild pain 30 tablet 0    doxycycline monohydrate (MONODOX) 100 mg capsule Start taking twice daily with food and water WHEN FLARING; discontinue when flare is controlled and/or resolved. May re-start again when flaring. (Patient not taking: Reported on 3/26/2025) 60 capsule 2    ibuprofen (MOTRIN) 600 mg tablet Take 1 tablet (600 mg total) by mouth every 6 (six) hours as needed for mild pain 60 tablet 0     No current facility-administered medications on file prior to visit.         Medical History Reviewed by provider this encounter:  Tobacco  Allergies  Meds  Problems  Med Hx  Surg Hx   "Fam Hx     .    Objective   /86 (BP Location: Left arm, Patient Position: Sitting, Cuff Size: Large)   Pulse 73   Ht 5' 2\" (1.575 m)   Wt 76.7 kg (169 lb)   LMP 12/03/2024 (Exact Date)   SpO2 99%   BMI 30.91 kg/m²      Body mass index is 30.91 kg/m².  Wt Readings from Last 3 Encounters:   03/26/25 76.7 kg (169 lb)   01/27/25 77.1 kg (170 lb)   01/23/25 76.2 kg (168 lb)     Physical Exam  Vitals and nursing note reviewed.   Constitutional:       Appearance: She is well-developed.   HENT:      Head: Normocephalic.   Eyes:      General: No scleral icterus.  Neck:      Thyroid: No thyromegaly.   Cardiovascular:      Rate and Rhythm: Normal rate and regular rhythm.      Pulses:           Radial pulses are 2+ on the right side and 2+ on the left side.      Heart sounds: No murmur heard.  Pulmonary:      Effort: Pulmonary effort is normal. No respiratory distress.      Breath sounds: Normal breath sounds. No wheezing.   Musculoskeletal:      Cervical back: Neck supple.   Skin:     General: Skin is warm and dry.   Neurological:      Mental Status: She is alert.      Deep Tendon Reflexes: Reflexes are normal and symmetric.         Labs:   Lab Results   Component Value Date    HGBA1C 6.0 (H) 03/21/2025    HGBA1C 5.6 12/04/2024     Lab Results   Component Value Date    CREATININE 0.88 03/21/2025    CREATININE 0.85 12/04/2024    CREATININE 0.83 09/20/2024    BUN 10 03/21/2025     05/07/2015    K 4.0 03/21/2025     03/21/2025    CO2 24 03/21/2025     GFR, Calculated   Date Value Ref Range Status   08/25/2019 77 >60 mL/min/1.73m2 Final     Comment:     mL/min per 1.73 square meters                                            Normal Function or Mild Renal    Disease (if clinically at risk):  >or=60  Moderately Decreased:                30-59  Severely Decreased:                  15-29  Renal Failure:                         <15                                            -American GFR: multiply " reported GFR by 1.16    Please note that the eGFR is based on the CKD-EPI calculation, and is not intended to be used for drug dosing.                                            Note: Calculated GFR may not be an accurate indicator of renal function if the patient's renal function is not in a steady state.     eGFR   Date Value Ref Range Status   03/21/2025 81 ml/min/1.73sq m Final     Lab Results   Component Value Date    HDL 43 (L) 03/21/2025    TRIG 204 (H) 03/21/2025     Lab Results   Component Value Date    ALT 8 03/21/2025    AST 9 (L) 03/21/2025    ALKPHOS 56 03/21/2025    BILITOT 0.2 05/07/2015     Lab Results   Component Value Date    IPV1JSQTNOPV 4.719 (H) 03/21/2025    RYU6CRAKPBCN 3.418 09/20/2024    [id number] 2.470 03/06/2024     Lab Results   Component Value Date    FREET4 0.82 03/21/2025       There are no Patient Instructions on file for this visit.    Discussed with the patient and all questioned fully answered. She will call me if any problems arise.    Administrative Statements   I have spent a total time of 30 minutes in caring for this patient on the day of the visit/encounter including Importance of tx compliance, Documenting in the medical record, Reviewing/placing orders in the medical record (including tests, medications, and/or procedures), and Obtaining or reviewing history  .

## 2025-03-26 NOTE — ASSESSMENT & PLAN NOTE
TSH elevated at 4.719 and free T4 normal at 0.82.  Increase Synthroid to 88 mcg daily  Repeat TFTs in 6 weeks and prior to next visit  Orders:    T4, free; Future    TSH, 3rd generation; Future    Synthroid 88 MCG tablet; Take 1 tablet (88 mcg total) by mouth daily    TSH + Free T4; Future

## 2025-03-26 NOTE — ASSESSMENT & PLAN NOTE
A1c 6.0%  Start metformin 500 mg daily  Discussed SE, safety/efficacy  Recommended healthy diet and routine exercise as tolerated  Orders:    Hemoglobin A1C; Future    Basic metabolic panel; Future    metFORMIN (GLUCOPHAGE-XR) 500 mg 24 hr tablet; Take 1 tablet (500 mg total) by mouth daily with dinner

## 2025-04-05 ENCOUNTER — OFFICE VISIT (OUTPATIENT)
Dept: URGENT CARE | Facility: CLINIC | Age: 40
End: 2025-04-05
Payer: COMMERCIAL

## 2025-04-05 VITALS
RESPIRATION RATE: 18 BRPM | TEMPERATURE: 97.3 F | WEIGHT: 169 LBS | BODY MASS INDEX: 31.1 KG/M2 | HEIGHT: 62 IN | SYSTOLIC BLOOD PRESSURE: 137 MMHG | OXYGEN SATURATION: 97 % | HEART RATE: 92 BPM | DIASTOLIC BLOOD PRESSURE: 88 MMHG

## 2025-04-05 DIAGNOSIS — Z11.52 ENCOUNTER FOR SCREENING FOR COVID-19: ICD-10-CM

## 2025-04-05 DIAGNOSIS — J06.9 VIRAL UPPER RESPIRATORY TRACT INFECTION: ICD-10-CM

## 2025-04-05 DIAGNOSIS — R68.89 FLU-LIKE SYMPTOMS: Primary | ICD-10-CM

## 2025-04-05 PROCEDURE — G0382 LEV 3 HOSP TYPE B ED VISIT: HCPCS | Performed by: NURSE PRACTITIONER

## 2025-04-05 PROCEDURE — S9083 URGENT CARE CENTER GLOBAL: HCPCS | Performed by: NURSE PRACTITIONER

## 2025-04-05 PROCEDURE — 87636 SARSCOV2 & INF A&B AMP PRB: CPT | Performed by: NURSE PRACTITIONER

## 2025-04-05 RX ORDER — BENZONATATE 200 MG/1
200 CAPSULE ORAL 3 TIMES DAILY PRN
Qty: 20 CAPSULE | Refills: 0 | Status: SHIPPED | OUTPATIENT
Start: 2025-04-05

## 2025-04-05 NOTE — PATIENT INSTRUCTIONS
--Rest, drink plenty of fluids   --COVID and flu testing sent. Will call with results. Average turn around time is 24-48 hours.   --Consider vitamin C, zinc, quercetin, and vitamin D to help strengthen your immune system. Consider also black elderberry (Sambucol) which can be effective for flu symptoms.   --For cough, you can take an OTC expectorant such as plain Robitussion or Mucinex (active ingredient guaifenesin).  A spoonful of honey at bedtime may also be helpful, as may a prescription cough medicine.  Also recommended is the use of a cool mist humidifier (with or without Vicks) in the bedroom at night.   --For sore throat, you can take OTC lozenges, use warm gargles (salt water or apple cider vinegar and honey), herbal teas, or an OTC throat spray (Chloraseptic).  --For nasal/sinus congestion, helpful measures include steam, warm compresses, an OTC saline nasal spray or Neti pot, or an OTC decongestant (such as Sudafed).  The decongestant should be avoided, however, if you are under 6 years of age, or have a history of high blood pressure or heart disease.  In addition, an OTC nasal steroid (Flonase, Nasocort) or nasal decongestant (Afrin, Maik-synephrine) may be taken.  The nasal steroid should be used at bedtime, after the saline nasal spray. The nasal decongestant should not be taken more than 3 days consecutively in order to prevent rebound congestion.   --For nasal drainage, postnasal drip, sneezing and itching, an OTC antihistamine (Allegra, Benadryl, etc) can be taken.   --You can take Tylenol or Motrin/Advil as needed for fever, headache, body aches. Motrin/Advil should be avoided, however, if you have a history of heart disease, bleeding ulcers, or if you take blood thinners.   --You should contact your primary care provider and/or go to the ER if your symptoms are not improved or get worse over the next 7 days.  This includes new onset fever, localized ear pain, sinus pain, as well as worsening  cough, chest pain, shortness of breath, or significant weakness/fatigue.

## 2025-04-05 NOTE — PROGRESS NOTES
[facility]        NAME: Michaela Ware is a 40 y.o. female  : 1985    MRN: [id number]  DATE: 2025  TIME: 10:02 AM    Assessment and Plan   Flu-like symptoms [R68.89]  1. Flu-like symptoms        2. Viral upper respiratory tract infection  benzonatate (TESSALON) 200 MG capsule      3. Encounter for screening for COVID-19  Covid19 and INFLUENZA A/B PCR            Patient Instructions     Patient Instructions   --Rest, drink plenty of fluids   --COVID and flu testing sent. Will call with results. Average turn around time is 24-48 hours.   --Consider vitamin C, zinc, quercetin, and vitamin D to help strengthen your immune system. Consider also black elderberry (Sambucol) which can be effective for flu symptoms.   --For cough, you can take an OTC expectorant such as plain Robitussion or Mucinex (active ingredient guaifenesin).  A spoonful of honey at bedtime may also be helpful, as may a prescription cough medicine.  Also recommended is the use of a cool mist humidifier (with or without Vicks) in the bedroom at night.   --For sore throat, you can take OTC lozenges, use warm gargles (salt water or apple cider vinegar and honey), herbal teas, or an OTC throat spray (Chloraseptic).  --For nasal/sinus congestion, helpful measures include steam, warm compresses, an OTC saline nasal spray or Neti pot, or an OTC decongestant (such as Sudafed).  The decongestant should be avoided, however, if you are under 6 years of age, or have a history of high blood pressure or heart disease.  In addition, an OTC nasal steroid (Flonase, Nasocort) or nasal decongestant (Afrin, Maik-synephrine) may be taken.  The nasal steroid should be used at bedtime, after the saline nasal spray. The nasal decongestant should not be taken more than 3 days consecutively in order to prevent rebound congestion.   --For nasal drainage, postnasal drip, sneezing and itching, an OTC antihistamine (Allegra, Benadryl, etc) can be taken.    --You can take Tylenol or Motrin/Advil as needed for fever, headache, body aches. Motrin/Advil should be avoided, however, if you have a history of heart disease, bleeding ulcers, or if you take blood thinners.   --You should contact your primary care provider and/or go to the ER if your symptoms are not improved or get worse over the next 7 days.  This includes new onset fever, localized ear pain, sinus pain, as well as worsening cough, chest pain, shortness of breath, or significant weakness/fatigue.          If tests have been performed at [facility], our office will contact you with results if changes need to be made to the care plan discussed with you at the visit.  You can review your full results on [facility] MyChart.    Chief Complaint     Chief Complaint   Patient presents with    Cold Like Symptoms     Pt reports that sx started this week- coughing/body aches/chills/congestion/fatigued- pt reports that she is coughing up green mucous.          History of Present Illness       Here with complaints of cough productive of yellow/green sputum, nasal congestion, rhinorrhea, sore throat, headache, body aches, feverish, sweats x 3 days.    Some upset stomach, diarrhea, N/V (x1)  Initial dyspnea, but not since.    Taking Nyquil, Motrin, Dayquil.     recently sick with sinus infection.  No home COVID testing.          Review of Systems   Review of Systems   Constitutional:  Positive for fever.   HENT:  Positive for rhinorrhea and sore throat. Negative for ear pain and sinus pain.    Respiratory:  Positive for cough. Negative for shortness of breath.    Gastrointestinal:  Positive for diarrhea, nausea and vomiting.   Musculoskeletal:  Positive for myalgias.   Neurological:  Positive for headaches.         Current Medications       Current Outpatient Medications:     benzonatate (TESSALON) 200 MG capsule, Take 1 capsule (200 mg total) by mouth 3 (three) times a day as needed for cough, Disp: 20 capsule, Rfl:  0    acetaminophen (TYLENOL) 650 mg CR tablet, Take 1 tablet (650 mg total) by mouth every 8 (eight) hours as needed for mild pain, Disp: 30 tablet, Rfl: 0    desogestrel-ethinyl estradiol (APRI) 0.15-30 MG-MCG per tablet, Take 1 tablet by mouth daily, Disp: 84 tablet, Rfl: 1    doxycycline monohydrate (MONODOX) 100 mg capsule, Start taking twice daily with food and water WHEN FLARING; discontinue when flare is controlled and/or resolved. May re-start again when flaring. (Patient not taking: Reported on 3/26/2025), Disp: 60 capsule, Rfl: 2    ibuprofen (MOTRIN) 600 mg tablet, Take 1 tablet (600 mg total) by mouth every 6 (six) hours as needed for mild pain, Disp: 60 tablet, Rfl: 0    metFORMIN (GLUCOPHAGE-XR) 500 mg 24 hr tablet, Take 1 tablet (500 mg total) by mouth daily with dinner, Disp: 90 tablet, Rfl: 1    methylphenidate (CONCERTA) 18 mg ER tablet, Take 18 mg by mouth every morning (Patient taking differently: Take 18 mg by mouth every morning Taking sporadically), Disp: , Rfl:     metroNIDAZOLE (METROCREAM) 0.75 % cream, APPLY TOPICALLY 2 TIMES A DAY. APPLY 1 TO 2 TIMES A DAY TO FACE FOR ROSACEA, Disp: 45 g, Rfl: 3    Synthroid 88 MCG tablet, Take 1 tablet (88 mcg total) by mouth daily, Disp: 90 tablet, Rfl: 1    Current Allergies     Allergies as of 04/05/2025 - Reviewed 04/05/2025   Allergen Reaction Noted    Peanut oil - food allergy Cough and Other (See Comments) 08/26/2016    Peppermint oil - food allergy Cough, Hives, Itching, Other (See Comments), Rash, and Swelling 08/26/2016            The following portions of the patient's history were reviewed and updated as appropriate: allergies, current medications, past family history, past medical history, past social history, past surgical history and problem list.     Past Medical History:   Diagnosis Date    Allergic     Anemia 08/2023    Arthritis     B12 deficiency     Disease of thyroid gland     Fibroid     GERD (gastroesophageal reflux disease)      H/O: hysterectomy     Hashimoto's disease     HL (hearing loss)     Hypertension     Hypothyroidism     Otitis media     Sjogren's disease (HCC)     Skin tag     Varicella     Verruca        Past Surgical History:   Procedure Laterality Date    BARIATRIC SURGERY  abdominal lipo    12/2017    COLONOSCOPY      COSMETIC SURGERY      Liposuction    EGD AND COLONOSCOPY N/A 03/25/2016    Procedure: EGD AND COLONOSCOPY;  Surgeon: Aly Abreu MD;  [facility];  Service:     AK CYSTOURETHROSCOPY N/A 12/16/2024    Procedure: CYSTOSCOPY;  Surgeon: Jaja Cardona MD;  [facility]: [facility];  Service: Gynecology    AK LAPS TOTAL HYSTERECT 250 GM/< W/RMVL TUBE/OVARY N/A 12/16/2024    Procedure: EXAM UNDER ANESTHESIA; HYSTERECTOMY LAPAROSCOPIC TOTAL (LTH) W/ ROBOTICS, EXTENSIVE LYSIS OF ADHESIONS. LEFT SALPINGO-OPHORECTOMY, RIGHT SALPINGECTOMY.;  Surgeon: Jaja Cardona MD;  Location: [facility];  Service: Gynecology    REDUCTION MAMMAPLASTY Bilateral     16 years old       Family History   Problem Relation Age of Onset    Arthritis Mother     Heart attack Father     Heart disease Father     Hypertension Father     Thyroid disease Father     Arthritis Father     Autoimmune disease Father     Hashimoto's thyroiditis Brother     No Known Problems Brother     Thyroid disease Brother     Autoimmune disease Brother     Dementia Maternal Grandmother     Diabetes Maternal Grandmother     Asthma Maternal Grandmother     Arthritis Maternal Grandmother     Lymphoma Maternal Grandfather     Cancer Maternal Grandfather     Alzheimer's disease Paternal Grandmother     Arthritis Paternal Grandmother     Deep vein thrombosis Paternal Grandfather     Heart attack Paternal Grandfather     Clotting disorder Paternal Grandfather     Stroke Paternal Grandfather     Stroke Paternal Aunt     Stroke Paternal Aunt     Autoimmune disease Paternal Aunt     Cancer Paternal Uncle     Breast cancer Cousin     Colon cancer  "Neg Hx     Ovarian cancer Neg Hx          Medications have been verified.        Objective   /88   Pulse 92   Temp (!) 97.3 °F (36.3 °C)   Resp 18   Ht 5' 2\" (1.575 m)   Wt 76.7 kg (169 lb)   LMP 12/03/2024 (Exact Date)   SpO2 97%   BMI 30.91 kg/m²   Patient's last menstrual period was 12/03/2024 (exact date).       Physical Exam     Physical Exam  Constitutional:       General: She is not in acute distress.     Appearance: Normal appearance. She is well-developed. She is not ill-appearing, toxic-appearing or diaphoretic.   HENT:      Head: Normocephalic.      Right Ear: Tympanic membrane, ear canal and external ear normal.      Left Ear: Tympanic membrane, ear canal and external ear normal.      Nose: Congestion and rhinorrhea present.      Mouth/Throat:      Pharynx: No oropharyngeal exudate or posterior oropharyngeal erythema.   Eyes:      General:         Right eye: No discharge.         Left eye: No discharge.   Cardiovascular:      Rate and Rhythm: Normal rate and regular rhythm.      Heart sounds: Normal heart sounds. No murmur heard.  Pulmonary:      Effort: Pulmonary effort is normal. No respiratory distress.      Breath sounds: Normal breath sounds. No stridor. No wheezing, rhonchi or rales.   Chest:      Chest wall: No tenderness.   Abdominal:      Tenderness: There is no abdominal tenderness.   Musculoskeletal:      Cervical back: Neck supple.   Lymphadenopathy:      Cervical: No cervical adenopathy.   Skin:     General: Skin is warm and dry.   Neurological:      Mental Status: She is alert and oriented to person, place, and time.      Deep Tendon Reflexes: Reflexes are normal and symmetric.   Psychiatric:         Mood and Affect: Mood normal.                   "

## 2025-04-06 ENCOUNTER — RESULTS FOLLOW-UP (OUTPATIENT)
Dept: URGENT CARE | Facility: CLINIC | Age: 40
End: 2025-04-06

## 2025-04-06 LAB
FLUAV RNA RESP QL NAA+PROBE: NEGATIVE
FLUBV RNA RESP QL NAA+PROBE: NEGATIVE
SARS-COV-2 RNA RESP QL NAA+PROBE: NEGATIVE

## 2025-04-11 ENCOUNTER — ANNUAL EXAM (OUTPATIENT)
Dept: OBGYN CLINIC | Facility: CLINIC | Age: 40
End: 2025-04-11
Payer: COMMERCIAL

## 2025-04-11 VITALS
SYSTOLIC BLOOD PRESSURE: 122 MMHG | DIASTOLIC BLOOD PRESSURE: 84 MMHG | WEIGHT: 169 LBS | HEIGHT: 62 IN | BODY MASS INDEX: 31.1 KG/M2

## 2025-04-11 DIAGNOSIS — R10.2 PELVIC PAIN: ICD-10-CM

## 2025-04-11 DIAGNOSIS — N80.9 ENDOMETRIOSIS DETERMINED BY LAPAROSCOPY: ICD-10-CM

## 2025-04-11 DIAGNOSIS — N94.10 FEMALE DYSPAREUNIA: ICD-10-CM

## 2025-04-11 DIAGNOSIS — Z01.419 WELL WOMAN EXAM WITH ROUTINE GYNECOLOGICAL EXAM: Primary | ICD-10-CM

## 2025-04-11 DIAGNOSIS — R61 NIGHT SWEATS: ICD-10-CM

## 2025-04-11 DIAGNOSIS — Z12.31 ENCOUNTER FOR SCREENING MAMMOGRAM FOR MALIGNANT NEOPLASM OF BREAST: ICD-10-CM

## 2025-04-11 PROCEDURE — 99396 PREV VISIT EST AGE 40-64: CPT | Performed by: OBSTETRICS & GYNECOLOGY

## 2025-04-11 NOTE — Clinical Note
Jewel Scherer, I provided my patient Michaela information on pelvic floor PT with your office at [facility]. She is status post hysterectomy and L oophorectomy for treatment of endometriosis that caused significant adhesive disease throughout her pelvis. She is still having R sided pelvic pain that radiates down her R leg cyclically despite hormonal suppression of her menstrual cycle. I was hoping she could be evaluated by PT for exercises and pelvic floor PT to help with pelvic pain. I attached my note for reference for discussion of her ongoing evaluation and treatment options for endometriosis. My operative note from her surgery in December 2024 goes into further detail regarding the extent of her endometriosis discovered in her pelvis.

## 2025-04-11 NOTE — PROGRESS NOTES
ASSESSMENT & PLAN:   Diagnoses and all orders for this visit:    Well woman exam with routine gynecological exam    Encounter for screening mammogram for malignant neoplasm of breast  -     Mammo screening bilateral w 3d and cad; Future    Night sweats  -     Follicle stimulating hormone; Future  -     Estradiol; Future  -     Antimullerian hormone (AMH); Future    Pelvic pain  -     Ambulatory Referral to Physical Therapy; Future    Endometriosis determined by laparoscopy  -     Ambulatory Referral to Physical Therapy; Future    Female dyspareunia  -     Ambulatory Referral to Physical Therapy; Future      Discussed her pelvic pain and concern for return of endometriosis on R side. Will take a pill holiday from her birth control pills (Apri) to determine if that medication is also exacerbating her night sweats and other symptoms.   Will obtain blood work during her next cycle (not this month, the month after) to determine if perimenopause vs menopause transition is occurring. Due to possible damage to remaining ovary from surgery. Discussed unlikely if still having menstrual cycle changes each month.    Reviewed possible options for endometriosis treatment including switching from oral birth control to a GnRH antagonist like Orilissa that would simulate ovarian suppression more similar to menopause. We reviewed side effects such as hot flashes, night sweats, and vaginal dryness that may be exacerbated with GnRh antagonists and additional information was provided via educational brochure.     Discussed pelvic pain and pain radiating down her buttocks and leg similar to sciatic and gluteal pain. Referral provided for pelvic floor PT for pelvic pain and evaluation of sciatic pain.     The following were reviewed in today's visit: ASCCP guidelines, Gardasil vaccination, STD testing breast self exam, use and side effects of OCPs, use and side effects of HRT, menopause, exercise, healthy diet, and colonoscopy discussed  and ordered.    Patient to return to office in yearly for annual exam.     All questions have been answered to her satisfaction.        CC:  Annual Gynecologic Examination  Chief Complaint   Patient presents with    Gynecologic Exam     Annual exam, pap not indicated. Pt c/o pelvic pain, night sweats, and light spotting.   R. TLH 2024- Pap no longer indicated   Last pap 2018: Neg pap/ Neg HPV ([facility])   Last mammo 2024- Neg-repeat 1 yr   (hx bilateral breast reduction and fam hx breast cancer)   Colonoscopy 2024- Repeat In 10 yrs          HPI: Michaela Ware is a 40 y.o.  who presents for annual gynecologic examination.  She has the following concerns:  having pelvic pain occurring cyclically. She has it mainly on the R side (where her remaining ovary is located) and has had bloating in that area as well. Cyclically she also has light spotting occurring when she wipes despite her hysterectomy being performed. She also reports night sweats that are increasing in frequency since her hysterectomy was performed. During her cyclic pain she also experiences pain that radiates down her leg.     Her hysterectomy was performed along with a left salpingo-oophorectomy secondary to abnormal bleeding and significant pelvic pain secondary to endometriosis that was found throughout her pelvis. Significant lysis of adhesions was required to dissect her ovaries, tubes, and uterus from the pelvis due to the extensive amount of adhesive disease secondary to endometriosis. R ovary was left in place to prevent surgical menopause.     Health Maintenance:    Exercise: intermittently  Breast exams/breast awareness: yes  Diet: well balanced diet  Last mammogram:  - BIRADS 4  History of abnormal mammogram: no  Colorectal cancer screenin - repeat in 10 years.  DEXA: n/a    Past Medical History:   Diagnosis Date    Allergic     Anemia 2023    Arthritis     B12 deficiency     Disease of thyroid gland      Endometriosis     Fibroid     GERD (gastroesophageal reflux disease)     H/O: hysterectomy     Hashimoto's disease     HL (hearing loss)     Hypertension     Hypothyroidism     Otitis media     Sjogren's disease (HCC)     Skin tag     Varicella     Verruca        Past Surgical History:   Procedure Laterality Date    BARIATRIC SURGERY  abdominal lipo    12/2017    COLONOSCOPY      COSMETIC SURGERY      Liposuction    EGD AND COLONOSCOPY N/A 03/25/2016    Procedure: EGD AND COLONOSCOPY;  Surgeon: Aly Abreu MD;  [facility];  Service:     N[place] CYSTOURETHROSCOPY N/A 12/16/2024    Procedure: CYSTOSCOPY;  Surgeon: Jaja Cardona MD;  [facility];  Service: Gynecology    NJ LAPS TOTAL HYSTERECT 250 GM/< W/RMVL TUBE/OVARY N/A 12/16/2024    Procedure: EXAM UNDER ANESTHESIA; HYSTERECTOMY LAPAROSCOPIC TOTAL (LTH) W/ ROBOTICS, EXTENSIVE LYSIS OF ADHESIONS. LEFT SALPINGO-OPHORECTOMY, RIGHT SALPINGECTOMY.;  Surgeon: Jaja Cardona MD;  [facility];  Service: Gynecology    REDUCTION MAMMAPLASTY Bilateral     16 years old       Past OB/Gyn History:   Patient's last menstrual period was 12/03/2024 (exact date).    Menopause: premenopause  Menopause symptoms: night sweats, vaginal dryness, and decreased libido    Last Pap: 2023 : no abnormalities  History of abnormal Pap smear: no  HPV vaccine completed: unknown    Patient is currently sexually active.   STD testing: no  Current contraception: status post hysterectomy    Family History  Family History   Problem Relation Age of Onset    Arthritis Mother     Heart attack Father     Heart disease Father     Hypertension Father     Thyroid disease Father     Arthritis Father     Autoimmune disease Father     Hashimoto's thyroiditis Brother     No Known Problems Brother     Thyroid disease Brother     Autoimmune disease Brother     Dementia Maternal Grandmother     Diabetes Maternal Grandmother     Asthma Maternal Grandmother      Arthritis Maternal Grandmother     Lymphoma Maternal Grandfather     Cancer Maternal Grandfather     Alzheimer's disease Paternal Grandmother     Arthritis Paternal Grandmother     Deep vein thrombosis Paternal Grandfather     Heart attack Paternal Grandfather     Clotting disorder Paternal Grandfather     Stroke Paternal Grandfather     Stroke Paternal Aunt     Stroke Paternal Aunt     Autoimmune disease Paternal Aunt     Cancer Paternal Uncle     Breast cancer Cousin     Colon cancer Neg Hx     Ovarian cancer Neg Hx        Family history of uterine or ovarian cancer: no  Family history of breast cancer: no  Family history of colon cancer: no    Social History:  Social History     Socioeconomic History    Marital status: /Civil Union     Spouse name: Not on file    Number of children: Not on file    Years of education: Not on file    Highest education level: Not on file   Occupational History    Not on file   Tobacco Use    Smoking status: Some Days     Current packs/day: 0.05     Average packs/day: 0.1 packs/day for 21.3 years (1.1 ttl pk-yrs)     Types: Cigarettes     Start date: 2004     Passive exposure: Past    Smokeless tobacco: Never    Tobacco comments:     Social smoker   Vaping Use    Vaping status: Never Used   Substance and Sexual Activity    Alcohol use: Yes     Comment: social, 3 per month    Drug use: Never     Comment: Denies    Sexual activity: Not Currently     Partners: Male     Birth control/protection: None, Female Sterilization     Comment: Hysterectomy   Other Topics Concern    Not on file   Social History Narrative    Not on file     Social Drivers of Health     Financial Resource Strain: Not on file   Food Insecurity: Not on file   Transportation Needs: Not on file   Physical Activity: Not on file   Stress: Not on file   Social Connections: Not on file   Intimate Partner Violence: Not on file   Housing Stability: Not on file     Domestic violence screen:  negative    Allergies:  Allergies   Allergen Reactions    Peanut Oil - Food Allergy Cough and Other (See Comments)     oral    Peppermint Oil - Food Allergy Cough, Hives, Itching, Other (See Comments), Rash and Swelling     Sneezing.         Medications:    Current Outpatient Medications:     desogestrel-ethinyl estradiol (APRI) 0.15-30 MG-MCG per tablet, Take 1 tablet by mouth daily, Disp: 84 tablet, Rfl: 1    metFORMIN (GLUCOPHAGE-XR) 500 mg 24 hr tablet, Take 1 tablet (500 mg total) by mouth daily with dinner, Disp: 90 tablet, Rfl: 1    methylphenidate (CONCERTA) 18 mg ER tablet, Take 18 mg by mouth every morning, Disp: , Rfl:     metroNIDAZOLE (METROCREAM) 0.75 % cream, APPLY TOPICALLY 2 TIMES A DAY. APPLY 1 TO 2 TIMES A DAY TO FACE FOR ROSACEA, Disp: 45 g, Rfl: 3    Synthroid 88 MCG tablet, Take 1 tablet (88 mcg total) by mouth daily, Disp: 90 tablet, Rfl: 1    benzonatate (TESSALON) 200 MG capsule, Take 1 capsule (200 mg total) by mouth 3 (three) times a day as needed for cough (Patient not taking: Reported on 4/11/2025), Disp: 20 capsule, Rfl: 0    doxycycline monohydrate (MONODOX) 100 mg capsule, Start taking twice daily with food and water WHEN FLARING; discontinue when flare is controlled and/or resolved. May re-start again when flaring. (Patient not taking: Reported on 1/27/2025), Disp: 60 capsule, Rfl: 2    Review of Systems:  Review of Systems   Constitutional:  Negative for activity change, appetite change, fatigue and unexpected weight change.   Respiratory:  Negative for cough and shortness of breath.    Cardiovascular:  Negative for chest pain.   Gastrointestinal:  Positive for abdominal distention and abdominal pain. Negative for constipation, diarrhea, nausea and vomiting.   Endocrine: Positive for heat intolerance.   Genitourinary:  Positive for pelvic pain and vaginal bleeding. Negative for difficulty urinating, dyspareunia, frequency, menstrual problem, urgency, vaginal discharge and vaginal  "pain.   Musculoskeletal:  Positive for back pain.   Skin: Negative.    Neurological:  Negative for dizziness, weakness, light-headedness and headaches.   Psychiatric/Behavioral: Negative.           Physical Exam:  /84 (BP Location: Left arm, Patient Position: Sitting, Cuff Size: Standard)   Ht 5' 2\" (1.575 m)   Wt 76.7 kg (169 lb)   LMP 12/03/2024 (Exact Date)   BMI 30.91 kg/m²    Physical Exam  Constitutional:       General: She is not in acute distress.     Appearance: Normal appearance. She is well-developed. She is not diaphoretic.   Genitourinary:      Vulva and bladder normal.      No lesions in the vagina.      Genitourinary Comments: Perineum normal in appearance, no lacerations, no ulcerations, no lesions visualized.      Right Labia: No rash, tenderness or lesions.     Left Labia: No tenderness, lesions or rash.     No inguinal adenopathy present in the right or left side.     Vaginal cuff intact.     Perineal sutures intact.     No vaginal discharge, erythema, tenderness, bleeding, granulation tissue or cuff induration.      No vaginal prolapse present.     No vaginal atrophy present.     Vaginal exam comments: No evidence of bleeding within the vaginal vault. No cervical tissue present or visualized. No growth or tumor palpated on vaginal cuff.        Right Adnexa: not tender, not full and no mass present.     Left Adnexa: not tender, not full and no mass present.     Cervix is absent.      No parametrium nodularity or thickening present.     Uterus is absent.      No urethral prolapse or mass present.      Bladder is not tender.       Pelvic exam was performed with patient in the lithotomy position.   Rectum:      No tenderness or external hemorrhoid.   Breasts:     Breasts are symmetrical.      Right: No swelling, bleeding, mass, skin change or tenderness.      Left: No swelling, bleeding, mass, skin change or tenderness.   HENT:      Head: Normocephalic and atraumatic.   Neck:      Thyroid: " No thyromegaly or thyroid tenderness.   Cardiovascular:      Rate and Rhythm: Normal rate and regular rhythm.      Heart sounds: Normal heart sounds. No murmur heard.     No friction rub.   Pulmonary:      Effort: Pulmonary effort is normal. No respiratory distress.      Breath sounds: Normal breath sounds. No wheezing or rales.   Abdominal:      Palpations: Abdomen is soft. There is no mass.      Tenderness: There is no abdominal tenderness. There is no guarding.   Musculoskeletal:         General: No tenderness. Normal range of motion.      Right lower leg: No edema.      Left lower leg: No edema.   Lymphadenopathy:      Lower Body: No right inguinal adenopathy. No left inguinal adenopathy.   Neurological:      Mental Status: She is alert and oriented to person, place, and time.   Skin:     General: Skin is warm and dry.      Coloration: Skin is not pale.      Findings: No erythema.   Psychiatric:         Mood and Affect: Mood normal.         Behavior: Behavior normal.         Thought Content: Thought content normal.         Judgment: Judgment normal.   Vitals and nursing note reviewed.

## 2025-05-07 ENCOUNTER — OFFICE VISIT (OUTPATIENT)
Dept: FAMILY MEDICINE CLINIC | Facility: CLINIC | Age: 40
End: 2025-05-07
Payer: COMMERCIAL

## 2025-05-07 ENCOUNTER — TELEPHONE (OUTPATIENT)
Age: 40
End: 2025-05-07

## 2025-05-07 VITALS
SYSTOLIC BLOOD PRESSURE: 126 MMHG | HEIGHT: 62 IN | WEIGHT: 167.7 LBS | DIASTOLIC BLOOD PRESSURE: 90 MMHG | HEART RATE: 86 BPM | TEMPERATURE: 98.5 F | OXYGEN SATURATION: 99 % | BODY MASS INDEX: 30.86 KG/M2

## 2025-05-07 DIAGNOSIS — F41.1 ANXIETY STATE: ICD-10-CM

## 2025-05-07 DIAGNOSIS — Z11.4 SCREENING FOR HIV (HUMAN IMMUNODEFICIENCY VIRUS): ICD-10-CM

## 2025-05-07 DIAGNOSIS — F33.1 MODERATE EPISODE OF RECURRENT MAJOR DEPRESSIVE DISORDER (HCC): ICD-10-CM

## 2025-05-07 DIAGNOSIS — E61.1 IRON DEFICIENCY: Primary | ICD-10-CM

## 2025-05-07 DIAGNOSIS — F41.1 ANXIETY STATE: Primary | ICD-10-CM

## 2025-05-07 DIAGNOSIS — Z11.59 NEED FOR HEPATITIS C SCREENING TEST: ICD-10-CM

## 2025-05-07 DIAGNOSIS — G47.19 EXCESSIVE DAYTIME SLEEPINESS: ICD-10-CM

## 2025-05-07 DIAGNOSIS — M35.00 SJOGREN'S SYNDROME, WITH UNSPECIFIED ORGAN INVOLVEMENT (HCC): ICD-10-CM

## 2025-05-07 DIAGNOSIS — E53.8 B12 DEFICIENCY: ICD-10-CM

## 2025-05-07 PROBLEM — F32.2 SEVERE MAJOR DEPRESSIVE DISORDER (HCC): Status: RESOLVED | Noted: 2025-05-07 | Resolved: 2025-05-07

## 2025-05-07 PROBLEM — F32.2 SEVERE MAJOR DEPRESSIVE DISORDER (HCC): Status: ACTIVE | Noted: 2025-05-07

## 2025-05-07 PROBLEM — F32.A MILD DEPRESSION: Status: RESOLVED | Noted: 2024-04-09 | Resolved: 2025-05-07

## 2025-05-07 PROCEDURE — 96372 THER/PROPH/DIAG INJ SC/IM: CPT | Performed by: FAMILY MEDICINE

## 2025-05-07 PROCEDURE — 99204 OFFICE O/P NEW MOD 45 MIN: CPT | Performed by: FAMILY MEDICINE

## 2025-05-07 RX ORDER — CYANOCOBALAMIN 1000 UG/ML
1000 INJECTION, SOLUTION INTRAMUSCULAR; SUBCUTANEOUS
Status: SHIPPED | OUTPATIENT
Start: 2025-05-07

## 2025-05-07 RX ORDER — SERTRALINE HYDROCHLORIDE 25 MG/1
TABLET, FILM COATED ORAL
Qty: 60 TABLET | Refills: 5 | Status: SHIPPED | OUTPATIENT
Start: 2025-05-07 | End: 2025-05-07

## 2025-05-07 RX ORDER — SERTRALINE HYDROCHLORIDE 25 MG/1
TABLET, FILM COATED ORAL
Qty: 60 TABLET | Refills: 1 | Status: SHIPPED | OUTPATIENT
Start: 2025-05-07

## 2025-05-07 RX ADMIN — CYANOCOBALAMIN 1000 MCG: 1000 INJECTION, SOLUTION INTRAMUSCULAR; SUBCUTANEOUS at 11:10

## 2025-05-07 NOTE — TELEPHONE ENCOUNTER
Start zoloft 25mg tabs: 1/2 tab daily x 1 week, then 1 tab daily x 1 week, then 1 1/2 tabs daily x 1 week, then 2 tabs daily  Will resend

## 2025-05-07 NOTE — TELEPHONE ENCOUNTER
Charo at [facility] called needs clarification on directions of Zoloft 25 mg. Please call Charo at [phone]

## 2025-05-07 NOTE — ASSESSMENT & PLAN NOTE
Depression Screening Follow-up Plan: Patient's depression screening was positive with a PHQ-9 score of 10. Patient advised to follow-up with PCP for further management.  Suspect mental health overlay in much of symptomatology  Discussed options  Start zoloft and uptitrate  F/u 6 weeks  Continue counseling

## 2025-05-07 NOTE — PROGRESS NOTES
Name: Michaela Ware      : 1985      MRN: [id number]  Encounter Provider: Chelo Flannery DO  Encounter Date: 2025   Encounter department: [facility] [place]  :  Assessment & Plan  Iron deficiency  Recheck labs  On iron supplementation currently  Orders:    Ferritin    Celiac Antibodies Profile; Future    B12 deficiency  B12 shot today  Start 1000mcg daily otc  Repeat labs 6 weeks to see if this is trending up  Orders:    Vitamin B12    Celiac Antibodies Profile; Future    Anti-parietal antibody; Future    cyanocobalamin injection 1,000 mcg    Excessive daytime sleepiness  Refer to sleep medicine  Orders:    Ambulatory Referral to Sleep Medicine; Future    Sjogren's syndrome, with unspecified organ involvement (HCC)  Refer to rheumatology  Orders:    KYLIE Screen w/Reflex [facility]; Future    Sjogren's Antibodies; Future    Sedimentation rate, automated; Future    C-reactive protein; Future    Moderate episode of recurrent major depressive disorder (HCC)  Depression Screening Follow-up Plan: Patient's depression screening was positive with a PHQ-9 score of 10. Patient advised to follow-up with PCP for further management.  Suspect mental health overlay in much of symptomatology  Discussed options  Start zoloft and uptitrate  F/u 6 weeks  Continue counseling       Anxiety state  Zoloft as above  F/u 6 weeks       Need for hepatitis C screening test    Orders:    Hepatitis C Antibody; Future    Screening for HIV (human immunodeficiency virus)    Orders:    HIV 1/2 AG/AB w Reflex [facility] for 2 yr old and above; Future           History of Present Illness   HPI  Pt presents for establishment and multiple issues.      Pt has been both physically and mentally exhausted.      Physically, pt notes symptoms in each organ system.  Can't always tell me about timing.  Just overall doesn't feel well.  Could sleep at any time.  Never feels refreshed. Family hx of awais in mom and brother.  Snored prior to recent  "hysterectomy and states she stopped after.    Pt had recent hyster for endometriosis.  One ovary remaining.  Follows with obgyn.  Chronic pelvic pain.  Seeing pelvic PT    Notes hx of sjogren's dx'd by isabel years ago.  Doesn't seen rheum.  +arthralgias, myalgias.     Hx hypothyroid.  Sees endo.    Mentally, pt notes low mood, energy.  No si/hi.  Feels overwhelmed.  +anxiety.  No impulsivity or dimas.  Has done a lot of work with therapy and is better than she used to be, but admits there are still symptoms she struggles with regularly.    Hx iron deficiency which is slightly better on newer labs than older    +b12 deficiency.  Not supplementing.    Review of Systems   Constitutional:  Positive for fatigue.   HENT:  Positive for trouble swallowing.         Constant ear ringing   Eyes: Negative.    Respiratory:  Positive for chest tightness and shortness of breath.    Cardiovascular:  Positive for chest pain and palpitations.   Gastrointestinal:  Positive for abdominal pain, diarrhea and nausea.   Endocrine: Positive for cold intolerance and heat intolerance.   Genitourinary:  Positive for frequency and pelvic pain. Negative for difficulty urinating and dysuria.   Musculoskeletal:  Positive for arthralgias and myalgias.   Neurological:  Positive for light-headedness and headaches. Negative for dizziness.       Objective   /90 (Patient Position: Sitting, Cuff Size: Standard)   Pulse 86   Temp 98.5 °F (36.9 °C) (Temporal)   Ht 5' 2\" (1.575 m)   Wt 76.1 kg (167 lb 11.2 oz)   LMP 12/03/2024 (Exact Date)   SpO2 99%   BMI 30.67 kg/m²      Physical Exam  Constitutional:       Appearance: Normal appearance.   HENT:      Head: Normocephalic and atraumatic.      Right Ear: Tympanic membrane, ear canal and external ear normal.      Left Ear: Tympanic membrane, ear canal and external ear normal.      Nose: Nose normal. No congestion.      Mouth/Throat:      Mouth: Mucous membranes are moist.      Pharynx: No " oropharyngeal exudate or posterior oropharyngeal erythema.   Eyes:      Extraocular Movements: Extraocular movements intact.      Conjunctiva/sclera: Conjunctivae normal.      Pupils: Pupils are equal, round, and reactive to light.   Neck:      Vascular: No carotid bruit.   Cardiovascular:      Rate and Rhythm: Normal rate and regular rhythm.      Heart sounds: No murmur heard.     No friction rub. No gallop.   Pulmonary:      Effort: Pulmonary effort is normal.      Breath sounds: No wheezing, rhonchi or rales.   Abdominal:      General: Abdomen is flat. There is no distension.      Palpations: Abdomen is soft.      Tenderness: There is no abdominal tenderness.   Musculoskeletal:      Cervical back: Neck supple.   Lymphadenopathy:      Cervical: No cervical adenopathy.   Neurological:      General: No focal deficit present.      Mental Status: She is alert and oriented to person, place, and time.      Cranial Nerves: No cranial nerve deficit.      Motor: No weakness.      Deep Tendon Reflexes: Reflexes normal.   Psychiatric:         Attention and Perception: She does not perceive auditory or visual hallucinations.         Mood and Affect: Mood is anxious and depressed. Affect is tearful.         Speech: Speech is delayed and tangential. Speech is not rapid and pressured or slurred.         Behavior: Behavior normal.         Thought Content: Thought content normal.         Cognition and Memory: Cognition and memory normal.

## 2025-05-07 NOTE — PATIENT INSTRUCTIONS
1000 IU vitamin D3 daily  1000mcg b12 over-the-counter daily  Refer to sleep medicine  Start zoloft 25mg tabs: 1/2 tab daily x 1 week, then 1 tab daily x 1 week, then 1 1/2 tabs daily x 1 week, then 2 tabs daily  Check labs in 6 weeks

## 2025-05-08 ENCOUNTER — EVALUATION (OUTPATIENT)
Dept: PHYSICAL THERAPY | Facility: REHABILITATION | Age: 40
End: 2025-05-08
Attending: OBSTETRICS & GYNECOLOGY
Payer: COMMERCIAL

## 2025-05-08 DIAGNOSIS — N94.10 FEMALE DYSPAREUNIA: ICD-10-CM

## 2025-05-08 DIAGNOSIS — R10.2 PELVIC PAIN: ICD-10-CM

## 2025-05-08 DIAGNOSIS — M62.89 PELVIC FLOOR DYSFUNCTION IN FEMALE: Primary | ICD-10-CM

## 2025-05-08 DIAGNOSIS — N80.9 ENDOMETRIOSIS DETERMINED BY LAPAROSCOPY: ICD-10-CM

## 2025-05-08 PROCEDURE — 97140 MANUAL THERAPY 1/> REGIONS: CPT

## 2025-05-08 PROCEDURE — 97162 PT EVAL MOD COMPLEX 30 MIN: CPT

## 2025-05-08 NOTE — PROGRESS NOTES
PT Evaluation     Today's Date: 2025  Patient name: Michaela Ware  : 1985  MRN: [id number]  Referring provider: Jaja Cardona  Dx:  Encounter Diagnosis     ICD-10-CM    1. Pelvic floor dysfunction in female  M62.89       2. Pelvic pain  R10.2 Ambulatory Referral to Physical Therapy      3. Endometriosis determined by laparoscopy  N80.9 Ambulatory Referral to Physical Therapy      4. Female dyspareunia  N94.10 Ambulatory Referral to Physical Therapy          Start Time: 1245  Stop Time: 1345  Total time in clinic (min): 60 minutes       Assessment  Impairments: abnormal coordination, abnormal muscle firing, abnormal muscle tone, abnormal or restricted ROM, activity intolerance and pain with function  Symptom irritability: moderate    Assessment details:  Michaela Ware is a 40 y.o. year old female presenting to outpatient pelvic health physical therapy with a referral from provider for pelvic pain.  Patient reports symptoms beginning both prior and after hysterectomy on 2025. Patient notes hx of endometriosis with laparoscopic removal on 2025 of uterus and colon. Patient's chief complaint is right sided pelvic pain, right sided back pain, urinary leakage, urgency, and stress incontinence.      Internal Assessment performed today with patient verbal and written consent.  Examination reveals increased right sided PFM tension, reduced relaxation of right PFM, TTP, and noted reduced PFM synchrony with appropriate breathing mechanics. Patient will benefit from external orthopedic lumbar examination per hx of right radicular symptoms and right sided pelvic pain. Patient will benefit from right sided STM, and relaxation to assist with further progression into strengthening.      Patient will benefit from skilled PT services to address these deficits and improve function. Based on age and motivation,  patient is a positive candidate to respond favorably to physical therapy with a good  prognosis. . Patient was educated on examination and techniques, plan of care, goals of therapy, and HEP. Patient was provided an opportunity to ask any questions. Provided Pt initial HEP. Patient demonstrated and verbalized understanding. Verbally reported to hold exercises if increased pain or discomfort.  Pt will be seen 1-2x/week for 6-12 weeks. Patient will be re-assessed regularly in order to document progress and limit any regression. The goal of PT is to progress patient towards independence of self care management.      Understanding of Dx/Px/POC: good     Prognosis: good    Goals  Dysfunction:   In 4 weeks, patient will demonstrate improved PFM strength to at least 4/5.  In 6 weeks, patient will demonstrate improved PFM endurance to 10 sec of 3/5 strength or greater.   In 8 weeks, patient will demonstrate improved PFM relaxation to at least 100% or greater.   In 8 weeks, patient will report a reduction in PFM tenderness of the right PFM to <2/10    Bladder:  In 6 weeks, patient will report improvement in stress incontinence as measured by 50% reduction in  leaks per week.  In 8 weeks, patient will demonstrate normalized daytime void interval of 2-4 hours with adequate fluid intake.   In 8 weeks, patient will reduce nocturia to 1 per night.    Pain:   In 4 weeks, patient will tolerate painfree intravaginal penetration, specifically <4/10.  In 6 weeks, patient will report painfree intravaginal PFM exam.  In 8 weeks, patient will report <1/10 with deep penetration.      Bowel:  In 8 weeks, patient will report improvement in defecatory function as evidenced by 1 bowel movement per week.    Function:  In 6 weeks, patient will report >13.5 point reduction on PFDI  In 8 weeks, patient will report >70% improvement in symptoms        Plan  Patient would benefit from: skilled physical therapy  Planned modality interventions: biofeedback and shortwave diathermy    Planned therapy interventions: neuromuscular  re-education, manual therapy, massage, patient/caregiver education, postural training, strengthening, stretching, therapeutic activities, therapeutic exercise and home exercise program    Frequency: 1x week  Duration in weeks: 12  Plan of Care beginning date: 2025  Plan of Care expiration date: 2025  Plan details:  POC: urge deferral, PFM coordination, PFM down training, defecation mechanics, dilator training, manual cueing, breathing mechanics, abdominal strengthening, and biofeedback.        Subjective      Chief Complaint: Pelvic Pain, Back Pain, Hx of Hysterectomy (endometriosis along uterus and colon). Patient still has an ovary. Patient held activity for sometime.   HPI: Pt presents to PT w/report of right sided pelvic pain, and right sided back pain. Patient also notes some urinary frequency, urgency, and stress incontinence along with incomplete bladder emptying.   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   Social History: Lives with   Preferred language/communication style:  English.   PMH: 2024 (Endometriosis surgery/Hysterectomy)         Specific symptom history:    Urinary:     Onset: Prior to surgery and currently.   Symptoms Present:     Urinary Frequency, Stress Incontinence, Urge Frequency, and Incomplete emptying  Denies:           Dysuria and Frequent UTI  Daytime void interval: Every 1 hour   Uses of liners/pads/diapers? 0-1  Frequency of leakage:   Fluid intake:     Water Working on 50% of her bodyweight in water  (Two 32 oz water).    Coffee 16 oz in the morning   Coke zero Intermittent   Alcohol:           Socially        Bowel:     Defecates: Irregular - Regular since the procedure .  Constipation/Straining?: Yes - notes hx of right lower quadrant discomfort.   Use of Squatty Potty?: YES     Nutrition:   Patient consumes more fruits lately. She is working on more vegetable intake. Probiotics from yogurt.      GYN:       (episiotomies/perineal lacerations).  "  Sees GYN regularly? Yes  Menstruation: Hysterectomy   Menopausal:   Sexual Function:     Currently sexually active? Yes  Sexual dysfunction? Yes - initial and deep  Number of partners:   History of sexual trauma/abuse? Patient Denies   MSK:      History of abdominal surgery? Lypo surgery, and then hysterectomy.   Current exercise: Biking, Walk/Running                       Systems Review History:  Cardiovascular/pulmonary  Patient denies    Integumentary    Musculoskeletal Location: Right sided hip pain and back discomfort. Notes hx of a rash when right sided back discomfort arises.     Occupation: [employer]  Aggravating Factors: Sitting, rolling on the side.      Neuromuscular System      Surgical history and/or abdominal surgeries:         Goal: Reduce pelvic pain         Objective       Abdominal Assessment:      Abdominal Assessment: Breathing Mechanics:  Noted positive diaphragmatic breathing. Noted some TTP of right lower quadrant discomfort.     Core engagement:  Hooklying TrA: delayed    Curl up: 75  Cough: positive    Diastatis   Diastasis recti present? no  3\" above umbilicus (# fingers): 0  Umbilicus (# fingers): 0  3\" below umbilicus (# fingers): 0  Connective tissue integrity at linea alba: firm  no tenderness at linea alba  able to engage transverse abdominis     Visual Inspection of Perineum:   Excursion of perineal body in cephalad direction with contraction of pelvic floor muscles (PFM): good  Excursion of perineal body in caudal direction with relaxation of pelvic floor muscles (PFM): unable  Involuntary contraction with coughing: no  Involuntary relaxation with bearing down: no  Cotton swab test: non-tender  Sensation: intact    Pelvic Organ Prolapse   Position: hook-lying  At rest: none  Perineal body inspection: within normal limits        Pelvic Floor Muscle Exam:     Muscle Contraction: overflow   Breathing pattern with contraction: holding breath   Pelvic floor muscle relaxation is " incomplete.   50% pelvic floor relaxation        PERFECT Score   Power right: 3/5   Power left: 3/5   Endurance (seconds to max): 6    Fast flicks (in 10 seconds): 8   Perfect Score: Patient verbally consented to internal examination:    Left PFM Tone:  OI: no pain, low tone  IC: low tone, no pain  NH/PC: low tone, no pain    Right PFM Tone:  OI: moderate, pain  IC: moderate, pain  NH/PC: moderate, pain    Noted palpable tenderness and pain: yes, right sided    PFM Relaxation/Eccentric lengthening;  To Baseline: Complete  Eccentric lengthening beyond baseline: Fair              pelvic floor exam consent given by patient    Pelvic exam completed: vaginally              Precautions: Standard  Patient Active Problem List   Diagnosis    Acquired hypothyroidism    Hypothyroidism due to Hashimoto's thyroiditis    Vitamin D deficiency    Iron (Fe) deficiency anemia    Tinnitus of right ear    IFG (impaired fasting glucose)    Menorrhagia    Dysmenorrhea    Pelvic pain    Status post total hysterectomy    Status post bilateral salpingectomy    Status post left oophorectomy    Endometriosis determined by laparoscopy       Chief Complaint    Patient Subjective includes:  constipation, urinary leakage with laughing, coughing, sneezing, urinary leakage with exercise, and urinary frequency dyspareunia   Patient Goals        Objective Impairments to address   Pelvic Floor Strength    Power 3/5   Endurance  5   Fast Twitch  8   % relaxation    External Hip Strength        Lumbar Screen          PLAN OF CARE EXPIRATION: 07/08/2025    AUTHORIZATION EXPIRATION:     Date Authorization     Number of visits provided            Date of Service 05/08/2025        Visits Used 1        Visits Remaining         Ninoska Created                  Neuro Re-Ed         Urge deferral         Defecation mechanics         Breathing Mechanics         PFM Coordination         PFM Down Training         Internal Cueing                   Ther Ex          PFM Strengthening         Hip strengthening         Functional Strengthening         Abdominal Strengthening         Dilator Training         Aerobic         Therapeutic Rest Breaks         Mobility          High Impact         UE Strengthening                   Ther Activity         Voiding Diaries         Review of sxs         Fluid Intake         Education HEP, Pelvic Floor - with external model, PFM         Manual Ther         PFM exam         Ortho exam         Dynamometer Testing         Fascial Decompression         Bowel Massage  Bowel Massage: Performed FRANCESCA         Modalities                           Outcome Measure

## 2025-05-14 ENCOUNTER — OFFICE VISIT (OUTPATIENT)
Dept: PHYSICAL THERAPY | Facility: REHABILITATION | Age: 40
End: 2025-05-14
Attending: OBSTETRICS & GYNECOLOGY
Payer: COMMERCIAL

## 2025-05-14 DIAGNOSIS — N80.9 ENDOMETRIOSIS DETERMINED BY LAPAROSCOPY: ICD-10-CM

## 2025-05-14 DIAGNOSIS — R10.2 PELVIC PAIN: Primary | ICD-10-CM

## 2025-05-14 DIAGNOSIS — M62.89 PELVIC FLOOR DYSFUNCTION IN FEMALE: ICD-10-CM

## 2025-05-14 DIAGNOSIS — N94.10 FEMALE DYSPAREUNIA: ICD-10-CM

## 2025-05-14 PROCEDURE — 97140 MANUAL THERAPY 1/> REGIONS: CPT

## 2025-05-14 PROCEDURE — 97110 THERAPEUTIC EXERCISES: CPT

## 2025-05-14 NOTE — PROGRESS NOTES
"Daily Note     Today's date: 2025  Patient name: Michaela Ware  : 1985  MRN: [id number]  Referring provider: Jaja Cardona  Dx:   Encounter Diagnosis     ICD-10-CM    1. Pelvic pain  R10.2       2. Endometriosis determined by laparoscopy  N80.9       3. Female dyspareunia  N94.10       4. Pelvic floor dysfunction in female  M62.89           Start Time: 1100  Stop Time: 1200  Total time in clinic (min): 60 minutes    Subjective: Patient notes some soreness post previous session. Notes hx of right sided symptoms and hx of \"Swelling.\"       Objective: See treatment diary below      Assessment: Tolerated treatment well. Addendum created to initial evaluation secondary to back evaluation performed today per significant hx of right sided symptoms and the potential inter-regional dependency between the lumbar and hip. Further progressed manual techniques to assist with STM of the pelvic floor muscles. Patient responded well, noting a reduction in right sided PFM tone today compared to initial evaluation. However, notes some TTP along layer one of the PFM. Reduced with gentle STM. Patient notes with initial insertion some referral pain towards her bladder. Patient consented to manual techniques. Patient would benefit from continued PT to progress above deficits and improve function. Back examination noted (+) centralization with LSG and extension prone press up.       Plan: Continue per plan of care.      Precautions: Standard  Patient Active Problem List   Diagnosis    Acquired hypothyroidism    Hypothyroidism due to Hashimoto's thyroiditis    Vitamin D deficiency    Iron (Fe) deficiency anemia    Tinnitus of right ear    IFG (impaired fasting glucose)    Menorrhagia    Dysmenorrhea    Pelvic pain    Status post total hysterectomy    Status post bilateral salpingectomy    Status post left oophorectomy    Endometriosis determined by laparoscopy       Chief Complaint    Patient Subjective includes:  " constipation, urinary leakage with laughing, coughing, sneezing, urinary leakage with exercise, and urinary frequency dyspareunia   Patient Goals        Objective Impairments to address   Pelvic Floor Strength    Power 3/5   Endurance  5   Fast Twitch  8   % relaxation    External Hip Strength        Lumbar Screen          PLAN OF CARE EXPIRATION: 07/08/2025    AUTHORIZATION EXPIRATION:     Date Authorization     Number of visits provided            Date of Service 05/08/2025 5/14/2025       Visits Used 1 2       Visits Remaining         Medbridge Created                  Neuro Re-Ed         Urge deferral         Defecation mechanics         Breathing Mechanics         PFM Coordination         PFM Down Training         Internal Cueing                   Ther Ex  Lumbar back screen per hx of right sided symptoms and increased right tone. Note hx of radiculopathy of RLE.        PFM Strengthening         Hip strengthening         Functional Strengthening         Abdominal Strengthening         Dilator Training         Aerobic         Therapeutic Rest Breaks         Mobility          High Impact         UE Strengthening          Lumbar Repeated Movemnts  Standing RSG:  Ne, W    LSG  B, Centralized    Prone Extension  B, Centralized       Ther Activity         Voiding Diaries         Review of sxs         Fluid Intake         Education HEP, Pelvic Floor - with external model, PFM         Manual Ther         PFM exam  STM of right PFM and left PFM   23 minutes       Ortho exam         Dynamometer Testing         Fascial Decompression         Bowel Massage  Bowel Massage: Performed FRANCESCA         Modalities                           Outcome Measure

## 2025-05-21 ENCOUNTER — OFFICE VISIT (OUTPATIENT)
Dept: PHYSICAL THERAPY | Facility: REHABILITATION | Age: 40
End: 2025-05-21
Attending: OBSTETRICS & GYNECOLOGY
Payer: COMMERCIAL

## 2025-05-21 DIAGNOSIS — R10.2 PELVIC PAIN: Primary | ICD-10-CM

## 2025-05-21 DIAGNOSIS — M62.89 PELVIC FLOOR DYSFUNCTION IN FEMALE: ICD-10-CM

## 2025-05-21 DIAGNOSIS — N94.10 FEMALE DYSPAREUNIA: ICD-10-CM

## 2025-05-21 DIAGNOSIS — N80.9 ENDOMETRIOSIS DETERMINED BY LAPAROSCOPY: ICD-10-CM

## 2025-05-21 PROCEDURE — 97110 THERAPEUTIC EXERCISES: CPT

## 2025-05-21 PROCEDURE — 97140 MANUAL THERAPY 1/> REGIONS: CPT

## 2025-05-21 PROCEDURE — 97530 THERAPEUTIC ACTIVITIES: CPT

## 2025-05-21 NOTE — PROGRESS NOTES
"Daily Note     Today's date: 2025  Patient name: Michaela Ware  : 1985  MRN: [id number]  Referring provider: Jaja Cardona  Dx:   Encounter Diagnosis     ICD-10-CM    1. Pelvic pain  R10.2       2. Endometriosis determined by laparoscopy  N80.9       3. Female dyspareunia  N94.10       4. Pelvic floor dysfunction in female  M62.89           Start Time: 0935  Stop Time: 1030  Total time in clinic (min): 55 minutes    Subjective: Patient notes continued right SI joint symptoms. Notes some PFM \"twitching\" post previous session.       Objective: See treatment diary below      Assessment: Tolerated treatment well. Increased time spent reviewing patient's symptoms. Patient notes significant fatigue overall. Post PT she has been falling asleep for hours, and feels drained from activity. Patient did report post surgery she bled for 12 weeks.  Anticipate patient need further workup to assess for potential ferratin level change that can be influencing patient symptoms. Patient notes she mentally feels fine, but the fatigue is separate from her norm. Requested patient reach out to provider and follow up. Educated patient on winback treatment, inter regional dependency between hip and back (as back pain reproduced with internal). STM with winback toward right PFM OI. Noted reduced OI tension, and (+) response to C/R. Noted a reduction in pain post. Patient would benefit from continued PT      Plan: Continue per plan of care.      Precautions: Standard  Patient Active Problem List   Diagnosis    Acquired hypothyroidism    Hypothyroidism due to Hashimoto's thyroiditis    Vitamin D deficiency    Iron (Fe) deficiency anemia    Tinnitus of right ear    IFG (impaired fasting glucose)    Menorrhagia    Dysmenorrhea    Pelvic pain    Status post total hysterectomy    Status post bilateral salpingectomy    Status post left oophorectomy    Endometriosis determined by laparoscopy       Chief Complaint    Patient " Subjective includes:  constipation, urinary leakage with laughing, coughing, sneezing, urinary leakage with exercise, and urinary frequency dyspareunia   Patient Goals        Objective Impairments to address   Pelvic Floor Strength    Power 3/5   Endurance  5   Fast Twitch  8   % relaxation    External Hip Strength        Lumbar Screen          PLAN OF CARE EXPIRATION: 07/08/2025    AUTHORIZATION EXPIRATION:     Date Authorization     Number of visits provided            Date of Service 05/08/2025 5/14/2025 5/21/2025      Visits Used 1 2 3      Visits Remaining         Medbridge Created                  Neuro Re-Ed         Urge deferral         Defecation mechanics         Breathing Mechanics         PFM Coordination         PFM Down Training         Internal Cueing                   Ther Ex  Lumbar back screen per hx of right sided symptoms and increased right tone. Note hx of radiculopathy of RLE.  Further back analysis           Repeated Movements   Prone Lumbar Extension   1 x 12 (NE, NW)    Prone lumbar R SG  1 x 10   (Dec, B)    Prone lumbar extension RSG  1  x 10   (Dec, B)            PFM Strengthening         Hip strengthening         Functional Strengthening         Abdominal Strengthening         Dilator Training         Aerobic         Therapeutic Rest Breaks         Mobility          High Impact         UE Strengthening          Lumbar Repeated Movemnts  Standing RSG:  Ne, W    LSG  B, Centralized    Prone Extension  B, Centralized Prone lumbar extension with RSG  2 x 10 (dec, B) centralization.       Ther Activity         Voiding Diaries         Review of sxs         Fluid Intake         Education HEP, Pelvic Floor - with external model, PFM         Manual Ther         PFM exam  STM of right PFM and left PFM   23 minutes WinBack STM 10-15% 8 minutes right PFM OI   C/R inbetween. Referral to back. Noted reduction in pain post treatment         Ortho exam   (+) LLE upsplit   Long axis     Performed with  RLE as well (no significant change but positive change in LLE)      Dynamometer Testing         Fascial Decompression         Bowel Massage  Bowel Massage: Performed FRANCESCA         Modalities                           Outcome Measure

## 2025-05-23 ENCOUNTER — APPOINTMENT (OUTPATIENT)
Dept: LAB | Facility: CLINIC | Age: 40
End: 2025-05-23
Attending: OBSTETRICS & GYNECOLOGY
Payer: COMMERCIAL

## 2025-05-23 DIAGNOSIS — Z11.4 SCREENING FOR HIV (HUMAN IMMUNODEFICIENCY VIRUS): ICD-10-CM

## 2025-05-23 DIAGNOSIS — E06.3 HYPOTHYROIDISM DUE TO HASHIMOTO'S THYROIDITIS: ICD-10-CM

## 2025-05-23 DIAGNOSIS — M35.00 SJOGREN'S SYNDROME, WITH UNSPECIFIED ORGAN INVOLVEMENT (HCC): ICD-10-CM

## 2025-05-23 DIAGNOSIS — E53.8 B12 DEFICIENCY: ICD-10-CM

## 2025-05-23 DIAGNOSIS — Z11.59 NEED FOR HEPATITIS C SCREENING TEST: ICD-10-CM

## 2025-05-23 DIAGNOSIS — E61.1 IRON DEFICIENCY: ICD-10-CM

## 2025-05-23 DIAGNOSIS — R61 NIGHT SWEATS: ICD-10-CM

## 2025-05-23 LAB
ESTRADIOL SERPL-MCNC: 534.5 PG/ML
FSH SERPL-ACNC: 7.2 MIU/ML

## 2025-05-23 PROCEDURE — 82670 ASSAY OF TOTAL ESTRADIOL: CPT

## 2025-05-23 PROCEDURE — 82166 ASSAY ANTI-MULLERIAN HORM: CPT

## 2025-05-23 PROCEDURE — 83001 ASSAY OF GONADOTROPIN (FSH): CPT

## 2025-05-23 PROCEDURE — 36415 COLL VENOUS BLD VENIPUNCTURE: CPT

## 2025-05-27 ENCOUNTER — OFFICE VISIT (OUTPATIENT)
Dept: PHYSICAL THERAPY | Facility: REHABILITATION | Age: 40
End: 2025-05-27
Attending: OBSTETRICS & GYNECOLOGY
Payer: COMMERCIAL

## 2025-05-27 DIAGNOSIS — N94.10 FEMALE DYSPAREUNIA: ICD-10-CM

## 2025-05-27 DIAGNOSIS — M62.89 PELVIC FLOOR DYSFUNCTION IN FEMALE: ICD-10-CM

## 2025-05-27 DIAGNOSIS — N80.9 ENDOMETRIOSIS DETERMINED BY LAPAROSCOPY: ICD-10-CM

## 2025-05-27 DIAGNOSIS — R10.2 PELVIC PAIN: Primary | ICD-10-CM

## 2025-05-27 PROCEDURE — 97140 MANUAL THERAPY 1/> REGIONS: CPT

## 2025-05-27 NOTE — PROGRESS NOTES
Daily Note     Today's date: 2025  Patient name: Michaela Ware  : 1985  MRN: [id number]  Referring provider: Jaja Cardona  Dx:   Encounter Diagnosis     ICD-10-CM    1. Pelvic pain  R10.2       2. Endometriosis determined by laparoscopy  N80.9       3. Female dyspareunia  N94.10       4. Pelvic floor dysfunction in female  M62.89           Start Time: 0910  Stop Time: 1000  Total time in clinic (min): 50 minutes    Subjective: Patient notes she received a massage of her right hip and noted a reduction in symptoms. However, notes continued difficulty with voluntary voiding. Continued pelvic pain of right pelvic pain.       Objective: See treatment diary below. Further pelvic assessment performed today.  Noted intermittent bearing down with cue for PFM concentric activation. Progressed appropriate cueing. Noted TTP along ishiocavernosus and bulbocavernosus, and perineal membrane of R perineum. Note increased TTP along left introitus ~4:00. Note moderate vagina laxity of bladder with beardown.     Cotton Swab Test: TTP along right labia minora of 6-8 o clock.   Patient can perform C/R/eccentric lengthen of the PFM.       Assessment: Tolerated treatment well. Manual techniques progressed today with patient verbal consent. Further objective measures listed above, as well as addendum created to initial evaluation. PT to message surgeon for clearance for use of dilator due to continue discomfort with initial penetration. In addition, advised potential consult with urologist to confirm full bladder emptying.  In addition, reviewed techniques to assist with voluntary voiding. Post assessment, patient voiding without straining! In addition, noted PFM discoordination which can be an influencing factor to symptoms. Patient would benefit from continued PT to progress above deficits and improve function.       Plan: Continue per plan of care.      Precautions: Standard  Patient Active Problem List    Diagnosis    Acquired hypothyroidism    Hypothyroidism due to Hashimoto's thyroiditis    Vitamin D deficiency    Iron (Fe) deficiency anemia    Tinnitus of right ear    IFG (impaired fasting glucose)    Menorrhagia    Dysmenorrhea    Pelvic pain    Status post total hysterectomy    Status post bilateral salpingectomy    Status post left oophorectomy    Endometriosis determined by laparoscopy       Chief Complaint    Patient Subjective includes:  constipation, urinary leakage with laughing, coughing, sneezing, urinary leakage with exercise, and urinary frequency dyspareunia   Patient Goals        Objective Impairments to address   Pelvic Floor Strength    Power 3/5   Endurance  5   Fast Twitch  8   % relaxation    External Hip Strength        Lumbar Screen          PLAN OF CARE EXPIRATION: 07/08/2025    AUTHORIZATION EXPIRATION:     Date Authorization     Number of visits provided            Date of Service 05/08/2025 5/14/2025 5/21/2025 5/27/2025     Visits Used 1 2 3 4     Visits Remaining         [employer] Created                  Neuro Re-Ed         Urge deferral         Defecation mechanics         Breathing Mechanics         PFM Coordination         PFM Down Training         Internal Cueing                   Ther Ex  Lumbar back screen per hx of right sided symptoms and increased right tone. Note hx of radiculopathy of RLE.  Further back analysis           Repeated Movements   Prone Lumbar Extension   1 x 12 (NE, NW)    Prone lumbar R SG  1 x 10   (Dec, B)    Prone lumbar extension RSG  1  x 10   (Dec, B)            PFM Strengthening         Hip strengthening         Functional Strengthening         Abdominal Strengthening         Dilator Training         Aerobic         Therapeutic Rest Breaks         Mobility          High Impact         UE Strengthening          Lumbar Repeated Movemnts  Standing RSG:  Ne, W    LSG  B, Centralized    Prone Extension  B, Centralized Prone lumbar extension with RSG  2 x 10  (dec, B) centralization.       Ther Activity         Voiding Diaries         Review of sxs         Fluid Intake         Education HEP, Pelvic Floor - with external model, PFM         Manual Ther         PFM exam  STM of right PFM and left PFM   23 minutes WinBack STM 10-15% 8 minutes right PFM OI   C/R inbetween. Referral to back. Noted reduction in pain post treatment    Increased time spent further assessing PFM secondary to continued pelvic pain. See objective measures.      Ortho exam   (+) LLE upsplit   Long axis     Performed with RLE as well (no significant change but positive change in LLE)      Dynamometer Testing         Fascial Decompression         Bowel Massage  Bowel Massage: Performed FRANCESCA         Modalities                           Outcome Measure

## 2025-05-28 ENCOUNTER — RESULTS FOLLOW-UP (OUTPATIENT)
Dept: LABOR AND DELIVERY | Facility: HOSPITAL | Age: 40
End: 2025-05-28

## 2025-05-28 LAB — MIS SERPL-MCNC: 0.19 NG/ML

## 2025-06-09 ENCOUNTER — OFFICE VISIT (OUTPATIENT)
Dept: PHYSICAL THERAPY | Facility: REHABILITATION | Age: 40
End: 2025-06-09
Attending: OBSTETRICS & GYNECOLOGY
Payer: COMMERCIAL

## 2025-06-09 DIAGNOSIS — R10.2 PELVIC PAIN: Primary | ICD-10-CM

## 2025-06-09 DIAGNOSIS — N80.9 ENDOMETRIOSIS DETERMINED BY LAPAROSCOPY: ICD-10-CM

## 2025-06-09 DIAGNOSIS — M62.89 PELVIC FLOOR DYSFUNCTION IN FEMALE: ICD-10-CM

## 2025-06-09 DIAGNOSIS — N94.10 FEMALE DYSPAREUNIA: ICD-10-CM

## 2025-06-09 PROCEDURE — 97530 THERAPEUTIC ACTIVITIES: CPT

## 2025-06-09 PROCEDURE — 97140 MANUAL THERAPY 1/> REGIONS: CPT

## 2025-06-09 NOTE — PROGRESS NOTES
Daily Note     Today's date: 2025  Patient name: Michaela Ware  : 1985  MRN: [id number]  Referring provider: Jaja Cardona  Dx:   Encounter Diagnosis     ICD-10-CM    1. Pelvic pain  R10.2       2. Endometriosis determined by laparoscopy  N80.9       3. Female dyspareunia  N94.10       4. Pelvic floor dysfunction in female  M62.89                      Subjective:Patient notes flare in her RLQ pain after previous session internal work. 5/10 at this time, shooting, sharp, achy.   Notes continued pain w/ PIV penetration - felt it was closer to introitus. Lubricant has helped.                               Would like to get into running. Goal to incorporate increased exercise tolerance.       Objective       Abdominal Assessment:      Abdominal Assessment: RUQ: palpation reproduced pain   LUQ: No TTP  RLQ: No TTP   LLQ: No TTP   Scars present: yes - well healed   Abdominal Brace: fair TA activation, initial bear down w/ valsalva            Pelvic Floor Muscle Exam:             PERFECT Score        Perfect Score:   R TTP: bulbospongiosus, ischiocavernosus, super transverse perineal, puborectalis,  pubococcygeus, iIliococcygeus, and obturator internus (OI reproduced chief  RLQ), burning noted at perinuem   L TTP: super transverse perineal, puborectalis,  pubococcygeus, iIliococcygeus, and obturator internus                    : See treatment diary below      Assessment: Tolerated treatment well. Performed PFM stretching w/ fair tolerance. Noted exacerbation of sxs following. Educated pt to perform inhale relax daily as HEP in response to PFM tension. Today, performed KT taping over RLQ w/ J shape and cephalic tensioning. Educated pt on precautions for removal. Denied previous reactions to adhesives. Skin unremarkable pre and post. Noted improvement in sxs following.   Patient would benefit from continued PT. Reinforced breathing mechanics, updated HEP. Some pf pt's chief complaint consistent w/  ilioinguinal distribution and may benefit from guided mobility along nerve path next visit w/ lumbar screen.        Plan: Continue per plan of care       Precautions: Standard  Patient Active Problem List   Diagnosis    Acquired hypothyroidism    Hypothyroidism due to Hashimoto's thyroiditis    Vitamin D deficiency    Iron (Fe) deficiency anemia    Tinnitus of right ear    IFG (impaired fasting glucose)    Menorrhagia    Dysmenorrhea    Pelvic pain    Status post total hysterectomy    Status post bilateral salpingectomy    Status post left oophorectomy    Endometriosis determined by laparoscopy       Chief Complaint    Patient Subjective includes:  constipation, urinary leakage with laughing, coughing, sneezing, urinary leakage with exercise, and urinary frequency dyspareunia   Patient Goals        Objective Impairments to address   Pelvic Floor Strength    Power 3/5   Endurance  5   Fast Twitch  8   % relaxation    External Hip Strength        Lumbar Screen          PLAN OF CARE EXPIRATION: 07/08/2025    AUTHORIZATION EXPIRATION:     Date Authorization     Number of visits provided            Date of Service 05/08/2025 5/14/2025 5/21/2025 5/27/2025 6/9    Visits Used 1 2 3 4 5    Visits Remaining         Medbridge Created                  Neuro Re-Ed         Urge deferral         Defecation mechanics         Breathing Mechanics         PFM Coordination         PFM Down Training         Internal Cueing                   Ther Ex  Lumbar back screen per hx of right sided symptoms and increased right tone. Note hx of radiculopathy of RLE.  Further back analysis           Repeated Movements   Prone Lumbar Extension   1 x 12 (NE, NW)    Prone lumbar R SG  1 x 10   (Dec, B)    Prone lumbar extension RSG  1  x 10   (Dec, B)            PFM Strengthening         Hip strengthening         Functional Strengthening         Abdominal Strengthening         Dilator Training         Aerobic         Therapeutic Rest Breaks          Mobility          High Impact         UE Strengthening          Lumbar Repeated Movemnts  Standing RSG:  Ne, W    LSG  B, Centralized    Prone Extension  B, Centralized Prone lumbar extension with RSG  2 x 10 (dec, B) centralization.       Ther Activity         Voiding Diaries         Review of sxs     Performed   30'     Fluid Intake         Education HEP, Pelvic Floor - with external model, PFM         Manual Ther         PFM exam  STM of right PFM and left PFM   23 minutes WinBack STM 10-15% 8 minutes right PFM OI   C/R inbetween. Referral to back. Noted reduction in pain post treatment    Increased time spent further assessing PFM secondary to continued pelvic pain. See objective measures.  PFM stretching 25'     KT taping 5'     Ortho exam   (+) LLE upsplit   Long axis     Performed with RLE as well (no significant change but positive change in LLE)      Dynamometer Testing         Fascial Decompression         Bowel Massage  Bowel Massage: Performed FRANCESCA         Modalities                           Outcome Measure

## 2025-06-09 NOTE — PROGRESS NOTES
Daily Note     Today's date: 2025  Patient name: Michaela Ware  : 1985  MRN: [id number]  Referring provider: Jaja Cardona  Dx:   Encounter Diagnosis     ICD-10-CM    1. Pelvic pain  R10.2       2. Endometriosis determined by laparoscopy  N80.9       3. Female dyspareunia  N94.10       4. Pelvic floor dysfunction in female  M62.89                      Subjective: Patient notes flare in her RLQ pain after previous session internal work. 5/10 at this time, shooting, sharp, achy.   Notes continued pain w/ PIV penetration - felt it was closer to introitus. Lubricant has helped.                              Would like to get into running. Goal to imp      Objective: See treatment diary below. Further pelvic assessment performed today.  Noted intermittent bearing down with cue for PFM concentric activation. Progressed appropriate cueing. Noted TTP along ishiocavernosus and bulbocavernosus, and perineal membrane of R perineum. Note increased TTP along left introitus ~4:00. Note moderate vagina laxity of bladder with [place].     Cotton Swab Test: TTP along right labia minora of 6-8 o clock.   Patient can perform C/R/eccentric lengthen of the PFM.       Assessment: Tolerated treatment well. Manual techniques progressed today with patient verbal consent. Further objective measures listed above, as well as addendum created to initial evaluation. PT to message surgeon for clearance for use of dilator due to continue discomfort with initial penetration. In addition, advised potential consult with urologist to confirm full bladder emptying.  In addition, reviewed techniques to assist with voluntary voiding. Post assessment, patient voiding without straining! In addition, noted PFM discoordination which can be an influencing factor to symptoms. Patient would benefit from continued PT to progress above deficits and improve function.       Plan: Continue per plan of care.      Precautions: Standard  Patient  Active Problem List   Diagnosis    Acquired hypothyroidism    Hypothyroidism due to Hashimoto's thyroiditis    Vitamin D deficiency    Iron (Fe) deficiency anemia    Tinnitus of right ear    IFG (impaired fasting glucose)    Menorrhagia    Dysmenorrhea    Pelvic pain    Status post total hysterectomy    Status post bilateral salpingectomy    Status post left oophorectomy    Endometriosis determined by laparoscopy       Chief Complaint    Patient Subjective includes:  constipation, urinary leakage with laughing, coughing, sneezing, urinary leakage with exercise, and urinary frequency dyspareunia   Patient Goals        Objective Impairments to address   Pelvic Floor Strength    Power 3/5   Endurance  5   Fast Twitch  8   % relaxation    External Hip Strength        Lumbar Screen          PLAN OF CARE EXPIRATION: 07/08/2025    AUTHORIZATION EXPIRATION:     Date Authorization     Number of visits provided            Date of Service 05/08/2025 5/14/2025 5/21/2025 5/27/2025 6/9    Visits Used 1 2 3 4 5    Visits Remaining         Med[employer] Created                  Neuro Re-Ed         Urge deferral         Defecation mechanics         Breathing Mechanics         PFM Coordination         PFM Down Training         Internal Cueing                   Ther Ex  Lumbar back screen per hx of right sided symptoms and increased right tone. Note hx of radiculopathy of RLE.  Further back analysis           Repeated Movements   Prone Lumbar Extension   1 x 12 (NE, NW)    Prone lumbar R SG  1 x 10   (Dec, B)    Prone lumbar extension RSG  1  x 10   (Dec, B)            PFM Strengthening         Hip strengthening         Functional Strengthening         Abdominal Strengthening         Dilator Training         Aerobic         Therapeutic Rest Breaks         Mobility          High Impact         UE Strengthening          Lumbar Repeated Movemnts  Standing RSG:  Ne, W    LSG  B, Centralized    Prone Extension  B, Centralized Prone lumbar  extension with RSG  2 x 10 (dec, B) centralization.       Ther Activity         Voiding Diaries         Review of sxs         Fluid Intake         Education HEP, Pelvic Floor - with external model, PFM         Manual Ther         PFM exam  STM of right PFM and left PFM   23 minutes WinBack STM 10-15% 8 minutes right PFM OI   C/R inbetween. Referral to back. Noted reduction in pain post treatment    Increased time spent further assessing PFM secondary to continued pelvic pain. See objective measures.      Ortho exam   (+) LLE upsplit   Long axis     Performed with RLE as well (no significant change but positive change in LLE)      Dynamometer Testing         Fascial Decompression         Bowel Massage  Bowel Massage: Performed FRANCESCA         Modalities                           Outcome Measure

## 2025-06-09 NOTE — HOME EXERCISE EDUCATION
Program_ID:[id number]   Access Code: [email or web address]  URL: [email or web address]  Date: 06-  Prepared By: Migdalia Guevara    Program Notes    Put forearm on wall  Exercises      - Right Standing Lateral Shift Correction at Wall - Repetitions - 3 x daily - 7 x weekly - 1 sets - 10 reps      - Supine Diaphragmatic Breathing - 1 x daily - 7 x weekly -  sets -  reps      - Diaphragmatic Breathing in Supported Child&#39;s Pose with Pelvic Floor Relaxation - 1 x daily - 7 x weekly -  sets -  reps      - Sidelying Diaphragmatic Breathing - 1 x daily - 7 x weekly -  sets -  reps      - Diaphragmatic Breathing with Hips Elevated -  x daily - 7 x weekly -  sets -  reps      - Diaphragmatic Breathing with Legs on Wall And Hips on a Pillow - 1 x daily - 7 x weekly -  sets -  reps

## 2025-06-11 ENCOUNTER — RA CDI HCC (OUTPATIENT)
Dept: OTHER | Facility: HOSPITAL | Age: 40
End: 2025-06-11

## 2025-06-12 ENCOUNTER — RESULTS FOLLOW-UP (OUTPATIENT)
Dept: FAMILY MEDICINE CLINIC | Facility: CLINIC | Age: 40
End: 2025-06-12

## 2025-06-12 ENCOUNTER — HOSPITAL ENCOUNTER (OUTPATIENT)
Facility: HOSPITAL | Age: 40
End: 2025-06-12
Payer: COMMERCIAL

## 2025-06-12 ENCOUNTER — APPOINTMENT (OUTPATIENT)
Dept: LAB | Facility: CLINIC | Age: 40
End: 2025-06-12
Attending: FAMILY MEDICINE
Payer: COMMERCIAL

## 2025-06-12 DIAGNOSIS — Z12.31 ENCOUNTER FOR SCREENING MAMMOGRAM FOR MALIGNANT NEOPLASM OF BREAST: ICD-10-CM

## 2025-06-12 DIAGNOSIS — E61.1 IRON DEFICIENCY: Primary | ICD-10-CM

## 2025-06-12 DIAGNOSIS — E53.8 VITAMIN B 12 DEFICIENCY: ICD-10-CM

## 2025-06-12 LAB
CRP SERPL QL: 5.3 MG/L
ERYTHROCYTE [SEDIMENTATION RATE] IN BLOOD: 28 MM/HOUR (ref 0–19)
FERRITIN SERPL-MCNC: 15 NG/ML (ref 30–307)
HCV AB SER QL: NORMAL
HIV 1+2 AB+HIV1 P24 AG SERPL QL IA: NORMAL
VIT B12 SERPL-MCNC: 532 PG/ML (ref 180–914)

## 2025-06-12 PROCEDURE — 77067 SCR MAMMO BI INCL CAD: CPT

## 2025-06-12 PROCEDURE — 85652 RBC SED RATE AUTOMATED: CPT

## 2025-06-12 PROCEDURE — 86140 C-REACTIVE PROTEIN: CPT

## 2025-06-12 PROCEDURE — 77063 BREAST TOMOSYNTHESIS BI: CPT

## 2025-06-12 PROCEDURE — 86364 TISS TRNSGLTMNASE EA IG CLAS: CPT

## 2025-06-12 PROCEDURE — 86038 ANTINUCLEAR ANTIBODIES: CPT

## 2025-06-12 PROCEDURE — 86225 DNA ANTIBODY NATIVE: CPT

## 2025-06-12 PROCEDURE — 87389 HIV-1 AG W/HIV-1&-2 AB AG IA: CPT

## 2025-06-12 PROCEDURE — 82784 ASSAY IGA/IGD/IGG/IGM EACH: CPT

## 2025-06-12 PROCEDURE — 86235 NUCLEAR ANTIGEN ANTIBODY: CPT

## 2025-06-12 PROCEDURE — 86803 HEPATITIS C AB TEST: CPT

## 2025-06-12 PROCEDURE — 83516 IMMUNOASSAY NONANTIBODY: CPT

## 2025-06-13 LAB — IGA SERPL-MCNC: 329 MG/DL (ref 66–433)

## 2025-06-14 LAB — PCA AB SER-ACNC: 23.5 UNITS (ref 0–20)

## 2025-06-16 ENCOUNTER — APPOINTMENT (OUTPATIENT)
Dept: PHYSICAL THERAPY | Facility: REHABILITATION | Age: 40
End: 2025-06-16
Attending: OBSTETRICS & GYNECOLOGY
Payer: COMMERCIAL

## 2025-06-16 DIAGNOSIS — N94.10 FEMALE DYSPAREUNIA: ICD-10-CM

## 2025-06-16 DIAGNOSIS — R10.2 PELVIC PAIN: Primary | ICD-10-CM

## 2025-06-16 DIAGNOSIS — M62.89 PELVIC FLOOR DYSFUNCTION IN FEMALE: ICD-10-CM

## 2025-06-16 DIAGNOSIS — N80.9 ENDOMETRIOSIS DETERMINED BY LAPAROSCOPY: ICD-10-CM

## 2025-06-16 LAB
DSDNA IGG SERPL IA-ACNC: <0.9 IU/ML (ref ?–15)
NUCLEAR IGG SER IA-RTO: 0.2 RATIO (ref ?–1)

## 2025-06-17 LAB
ENA SS-A AB SER IA-ACNC: 0.8 U/ML (ref ?–10)
ENA SS-B IGG SER IA-ACNC: <0.6 U/ML (ref ?–10)
TTG IGA SER IA-ACNC: 0.7 U/ML (ref ?–10)

## 2025-06-18 ENCOUNTER — OFFICE VISIT (OUTPATIENT)
Dept: FAMILY MEDICINE CLINIC | Facility: CLINIC | Age: 40
End: 2025-06-18
Payer: COMMERCIAL

## 2025-06-18 VITALS
OXYGEN SATURATION: 98 % | SYSTOLIC BLOOD PRESSURE: 132 MMHG | HEIGHT: 62 IN | HEART RATE: 89 BPM | DIASTOLIC BLOOD PRESSURE: 100 MMHG | BODY MASS INDEX: 31.06 KG/M2 | TEMPERATURE: 97.2 F | WEIGHT: 168.8 LBS

## 2025-06-18 DIAGNOSIS — E53.8 B12 DEFICIENCY: ICD-10-CM

## 2025-06-18 DIAGNOSIS — R10.9 ABDOMINAL PAIN, UNSPECIFIED ABDOMINAL LOCATION: Primary | ICD-10-CM

## 2025-06-18 DIAGNOSIS — E61.1 IRON DEFICIENCY: ICD-10-CM

## 2025-06-18 DIAGNOSIS — F41.1 ANXIETY STATE: ICD-10-CM

## 2025-06-18 DIAGNOSIS — F32.A MILD DEPRESSION: ICD-10-CM

## 2025-06-18 DIAGNOSIS — R41.840 INATTENTION: ICD-10-CM

## 2025-06-18 PROCEDURE — 99214 OFFICE O/P EST MOD 30 MIN: CPT | Performed by: FAMILY MEDICINE

## 2025-06-18 PROCEDURE — 96372 THER/PROPH/DIAG INJ SC/IM: CPT | Performed by: FAMILY MEDICINE

## 2025-06-18 RX ORDER — DICYCLOMINE HCL 20 MG
20 TABLET ORAL EVERY 6 HOURS
Qty: 30 TABLET | Refills: 3 | Status: SHIPPED | OUTPATIENT
Start: 2025-06-18 | End: 2025-06-18

## 2025-06-18 RX ORDER — SODIUM CHLORIDE 9 MG/ML
20 INJECTION, SOLUTION INTRAVENOUS ONCE
OUTPATIENT
Start: 2025-06-18

## 2025-06-18 RX ORDER — DICYCLOMINE HCL 20 MG
20 TABLET ORAL EVERY 6 HOURS
Qty: 120 TABLET | Refills: 0 | Status: SHIPPED | OUTPATIENT
Start: 2025-06-18

## 2025-06-18 RX ORDER — METHYLPHENIDATE HYDROCHLORIDE 27 MG/1
27 TABLET ORAL DAILY
Qty: 30 TABLET | Refills: 0 | Status: SHIPPED | OUTPATIENT
Start: 2025-06-18 | End: 2025-06-20

## 2025-06-18 RX ADMIN — CYANOCOBALAMIN 1000 MCG: 1000 INJECTION, SOLUTION INTRAMUSCULAR; SUBCUTANEOUS at 15:17

## 2025-06-18 NOTE — PROGRESS NOTES
Name: Michaela Ware      : 1985      MRN: [id number]  Encounter Provider: Chelo Flannery DO  Encounter Date: 2025   Encounter department: [facility]  :  Assessment & Plan  Abdominal pain, unspecified abdominal location  Ongoing  Check CT Abd/pelvis  Refer back to GI for ongoing care  Trial bentyl    Orders:    CT abdomen pelvis w contrast; Future    Ambulatory Referral to Gastroenterology; Future    dicyclomine (BENTYL) 20 mg tablet; Take 1 tablet (20 mg total) by mouth every 6 (six) hours    methylphenidate (CONCERTA) 27 MG ER tablet; Take 1 tablet (27 mg total) by mouth daily Max Daily Amount: 27 mg    B12 deficiency  + autoimmune  Has had egd  Monthly b12 shots and can also supplement orally       Iron deficiency  Not improving with oral iron  Refer for infusions which may help fatigue       Anxiety state  Pt feels her anxiety is stemming from physical state  Discussed mental and physical health overlay          Mild depression  Pt screens positive for depression, and I do believe there is emotional overlay to her symptoms which I expressed to her.   She wishes to avoid meds now           Inattention  On concerta but doesn't feel it lasts all day  Increase to 27mg   Recheck 4-6 weeks                History of Present Illness   HPI  Pt presents in f/u.  She stopped zoloft because she didn't feel like her problem was depression.      She states she feels the same as she has in the past.  Biggest issue is ongoing fatigue and abd pain.  Pt notes she has chronic and ongoing R sided abd pain.  On/off but daily cramping in abd/rectal area.  Sees GYN.  Had colonoscopy and egd last year.  Cramping sometimes worse prior to bowel mvmt    Most recent labs show:    Low ferritin despite oral iron therapy.  Increased abd pain with oral iron.  Improved b12.  +antiparietal ab (has had egd)      Review of Systems   Constitutional:  Positive for fatigue.   HENT: Negative.     Eyes: Negative.   "  Respiratory:  Positive for shortness of breath.    Cardiovascular:  Positive for palpitations. Negative for chest pain.   Gastrointestinal:  Positive for abdominal distention, abdominal pain and rectal pain.   Endocrine: Negative.    Musculoskeletal:  Positive for arthralgias and myalgias.   Neurological:  Positive for light-headedness and headaches.         Objective   /100 (Patient Position: Sitting, Cuff Size: Standard)   Pulse 89   Temp (!) 97.2 °F (36.2 °C) (Temporal)   Ht 5' 2\" (1.575 m)   Wt 76.6 kg (168 lb 12.8 oz)   LMP 12/03/2024 (Exact Date)   SpO2 98%   BMI 30.87 kg/m²      Physical Exam  Constitutional:       Appearance: Normal appearance.   HENT:      Head: Normocephalic and atraumatic.      Right Ear: Tympanic membrane, ear canal and external ear normal.      Left Ear: Tympanic membrane, ear canal and external ear normal.      Nose: Nose normal. No congestion.      Mouth/Throat:      Mouth: Mucous membranes are moist.      Pharynx: No oropharyngeal exudate or posterior oropharyngeal erythema.     Eyes:      Extraocular Movements: Extraocular movements intact.      Conjunctiva/sclera: Conjunctivae normal.      Pupils: Pupils are equal, round, and reactive to light.     Neck:      Vascular: No carotid bruit.     Cardiovascular:      Rate and Rhythm: Normal rate and regular rhythm.      Heart sounds: No murmur heard.     No friction rub. No gallop.   Pulmonary:      Effort: Pulmonary effort is normal.      Breath sounds: No wheezing, rhonchi or rales.   Abdominal:      General: Abdomen is flat.      Palpations: Abdomen is soft.      Tenderness: There is no abdominal tenderness. There is no guarding or rebound.      Comments: Mild tenderness RLQ/RUQ     Musculoskeletal:      Cervical back: Neck supple.   Lymphadenopathy:      Cervical: No cervical adenopathy.     Neurological:      General: No focal deficit present.      Mental Status: She is alert and oriented to person, place, and time. "      Cranial Nerves: No cranial nerve deficit.      Motor: No weakness.      Deep Tendon Reflexes: Reflexes normal.

## 2025-06-19 ENCOUNTER — PATIENT MESSAGE (OUTPATIENT)
Dept: FAMILY MEDICINE CLINIC | Facility: CLINIC | Age: 40
End: 2025-06-19

## 2025-06-19 DIAGNOSIS — F90.9 ATTENTION DEFICIT HYPERACTIVITY DISORDER (ADHD), UNSPECIFIED ADHD TYPE: Primary | ICD-10-CM

## 2025-06-20 RX ORDER — LISDEXAMFETAMINE DIMESYLATE 30 MG/1
30 CAPSULE ORAL EVERY MORNING
Qty: 30 CAPSULE | Refills: 0 | Status: SHIPPED | OUTPATIENT
Start: 2025-06-20

## 2025-06-20 NOTE — PROGRESS NOTES
Daily Note     Today's date: 2025  Patient name: Michaela Ware  : 1985  MRN: [id number]  Referring provider: Jaja Cardona*  Dx:   No diagnosis found.                 Subjective:Patient notes flare in her RLQ pain after previous session internal work. 5/10 at this time, shooting, sharp, achy.   Notes continued pain w/ PIV penetration - felt it was closer to introitus. Lubricant has helped.                               Would like to get into running. Goal to incorporate increased exercise tolerance.       Objective:              PERFECT Score        Perfect Score:   R TTP: bulbospongiosus, ischiocavernosus, super transverse perineal, puborectalis,  pubococcygeus, iIliococcygeus, and obturator internus (OI reproduced chief  RLQ), burning noted at perinuem   L TTP: super transverse perineal, puborectalis,  pubococcygeus, iIliococcygeus, and obturator internus                      Assessment: Tolerated treatment well. Performed PFM stretching w/ fair tolerance. Noted exacerbation of sxs following. Educated pt to perform inhale relax daily as HEP in response to PFM tension. Today, performed KT taping over RLQ w/ J shape and cephalic tensioning. Educated pt on precautions for removal. Denied previous reactions to adhesives. Skin unremarkable pre and post. Noted improvement in sxs following.   Patient would benefit from continued PT. Reinforced breathing mechanics, updated HEP. Some pf pt's chief complaint consistent w/ ilioinguinal distribution and may benefit from guided mobility along nerve path next visit w/ lumbar screen.        Plan: Continue per plan of care       Precautions: Standard  Patient Active Problem List   Diagnosis    Acquired hypothyroidism    Hypothyroidism due to Hashimoto's thyroiditis    Vitamin D deficiency    Iron (Fe) deficiency anemia    Tinnitus of right ear    IFG (impaired fasting glucose)    Menorrhagia    Dysmenorrhea    Pelvic pain    Status post total hysterectomy     Status post bilateral salpingectomy    Status post left oophorectomy    Endometriosis determined by laparoscopy       Chief Complaint    Patient Subjective includes:  constipation, urinary leakage with laughing, coughing, sneezing, urinary leakage with exercise, and urinary frequency dyspareunia   Patient Goals        Objective Impairments to address   Pelvic Floor Strength    Power 3/5   Endurance  5   Fast Twitch  8   % relaxation    External Hip Strength        Lumbar Screen          PLAN OF CARE EXPIRATION: 07/08/2025    AUTHORIZATION EXPIRATION:     Date Authorization     Number of visits provided            Date of Service 05/08/2025 5/14/2025 5/21/2025 5/27/2025 6/9    Visits Used 1 2 3 4 5    Visits Remaining         Medbridge Created                  Neuro Re-Ed         Urge deferral         Defecation mechanics         Breathing Mechanics         PFM Coordination         PFM Down Training         Internal Cueing                   Ther Ex  Lumbar back screen per hx of right sided symptoms and increased right tone. Note hx of radiculopathy of RLE.  Further back analysis           Repeated Movements   Prone Lumbar Extension   1 x 12 (NE, NW)    Prone lumbar R SG  1 x 10   (Dec, B)    Prone lumbar extension RSG  1  x 10   (Dec, B)            PFM Strengthening         Hip strengthening         Functional Strengthening         Abdominal Strengthening         Dilator Training         Aerobic         Therapeutic Rest Breaks         Mobility          High Impact         UE Strengthening          Lumbar Repeated Movemnts  Standing RSG:  Ne, W    LSG  B, Centralized    Prone Extension  B, Centralized Prone lumbar extension with RSG  2 x 10 (dec, B) centralization.       Ther Activity         Voiding Diaries         Review of sxs     Performed   30'     Fluid Intake         Education HEP, Pelvic Floor - with external model, PFM         Manual Ther         PFM exam  STM of right PFM and left PFM   23 minutes WinBack  STM 10-15% 8 minutes right PFM OI   C/R inbetween. Referral to back. Noted reduction in pain post treatment    Increased time spent further assessing PFM secondary to continued pelvic pain. See objective measures.  PFM stretching 25'     KT taping 5'     Ortho exam   (+) LLE upsplit   Long axis     Performed with RLE as well (no significant change but positive change in LLE)      Dynamometer Testing         Fascial Decompression         Bowel Massage  Bowel Massage: Performed FRANCESCA         Modalities                           Outcome Measure

## 2025-06-23 ENCOUNTER — APPOINTMENT (OUTPATIENT)
Dept: PHYSICAL THERAPY | Facility: REHABILITATION | Age: 40
End: 2025-06-23
Attending: OBSTETRICS & GYNECOLOGY
Payer: COMMERCIAL

## 2025-06-23 ENCOUNTER — TELEPHONE (OUTPATIENT)
Dept: PHYSICAL THERAPY | Facility: REHABILITATION | Age: 40
End: 2025-06-23

## 2025-06-24 ENCOUNTER — APPOINTMENT (OUTPATIENT)
Dept: LAB | Facility: CLINIC | Age: 40
End: 2025-06-24
Payer: COMMERCIAL

## 2025-06-24 DIAGNOSIS — E06.3 HYPOTHYROIDISM DUE TO HASHIMOTO'S THYROIDITIS: Primary | ICD-10-CM

## 2025-06-24 LAB
T4 FREE SERPL-MCNC: 0.87 NG/DL (ref 0.61–1.12)
TSH SERPL DL<=0.05 MIU/L-ACNC: 2.38 UIU/ML (ref 0.45–4.5)

## 2025-06-24 PROCEDURE — 84443 ASSAY THYROID STIM HORMONE: CPT

## 2025-06-24 PROCEDURE — 36415 COLL VENOUS BLD VENIPUNCTURE: CPT

## 2025-06-24 PROCEDURE — 84439 ASSAY OF FREE THYROXINE: CPT

## 2025-06-27 ENCOUNTER — TELEPHONE (OUTPATIENT)
Age: 40
End: 2025-06-27

## 2025-06-27 DIAGNOSIS — E53.8 B12 DEFICIENCY: Primary | ICD-10-CM

## 2025-06-27 NOTE — TELEPHONE ENCOUNTER
I already sent message to schedule infusions, and the monthly b12 orders are in as well.  Please set these up

## 2025-06-27 NOTE — TELEPHONE ENCOUNTER
Patient called to make Dr Flannery aware she has not heard from anyone yet about scheduling her iron infusions.  Checked with Central Scheduling, who advised that patient would call the Infusion Center to schedule.  Provided patient with the number for the [facility], [phone].  Patient also asked about scheduling her B12 injections.  She wondered if she could just walk in for them.  Informed her that they would require an appointment.  No order in system. Please advise and/or schedule accordingly.    Thank you!

## 2025-06-30 ENCOUNTER — OFFICE VISIT (OUTPATIENT)
Dept: PHYSICAL THERAPY | Facility: REHABILITATION | Age: 40
End: 2025-06-30
Attending: OBSTETRICS & GYNECOLOGY
Payer: COMMERCIAL

## 2025-06-30 DIAGNOSIS — R10.2 PELVIC PAIN: Primary | ICD-10-CM

## 2025-06-30 DIAGNOSIS — N94.10 FEMALE DYSPAREUNIA: ICD-10-CM

## 2025-06-30 DIAGNOSIS — N80.9 ENDOMETRIOSIS DETERMINED BY LAPAROSCOPY: ICD-10-CM

## 2025-06-30 DIAGNOSIS — M62.89 PELVIC FLOOR DYSFUNCTION IN FEMALE: ICD-10-CM

## 2025-06-30 PROCEDURE — 97530 THERAPEUTIC ACTIVITIES: CPT

## 2025-06-30 NOTE — PROGRESS NOTES
Daily Note     Today's date: 2025  Patient name: Michaela Ware  : 1985  MRN: [id number]  Referring provider: Jaja Cardona*  Dx:   Encounter Diagnosis     ICD-10-CM    1. Pelvic pain  R10.2       2. Endometriosis determined by laparoscopy  N80.9       3. Female dyspareunia  N94.10       4. Pelvic floor dysfunction in female  M62.89                        Subjective:Feels discouraged w/ continued sxs and pain. Felt that sxs have been flared over past 2 weeks. Reports frequent sensation on incomplete voiding ~15 min. Will return to void after initial pee and will only have trickles.       Objective: See treatment diary below    Home Exercise:     When you sit to pee, close your eyes, and visualize the pelvic floor muscles relaxing to initiate the stream of urine.   Continue to take slow breaths to promote relaxation   Do not push to pee  If you have sat for 60 seconds and the stream has not started, perform a slow pelvic floor contraction and stand from the toilet - return with an urge/ full feeling of the bladder  After you pee a full stream (“first pee”), perform x2 slow, strong pelvic floor contractions and stand from the toilet  Do not wait for the second pee   Try to pee every hour (whether you have to pee or not - this will help to train the bladder expect emptying and encourage more comfortable holding)  Try to keep a voiding schedule   Take notes of symptoms (urge intensity, discomfort, inability to start stream, sensation of incomplete voiding, etc)       Assessment: Tolerated treatment well. Reviewed current sxs, bladder health, bladder physiology, voiding dairies, voiding scheduling, PFM relaxation for stream initiation, and PFMC following voiding. Today, performed KT taping over suprapubic region w/ cephalic tensioning. Educated pt on precautions for removal. Denied previous reactions to adhesives. Skin unremarkable pre and post.  HEP above.          Plan: Continue per plan of care        Precautions: Standard  Patient Active Problem List   Diagnosis    Acquired hypothyroidism    Hypothyroidism due to Hashimoto's thyroiditis    Vitamin D deficiency    Iron (Fe) deficiency anemia    Tinnitus of right ear    IFG (impaired fasting glucose)    Menorrhagia    Dysmenorrhea    Pelvic pain    Status post total hysterectomy    Status post bilateral salpingectomy    Status post left oophorectomy    Endometriosis determined by laparoscopy       Chief Complaint    Patient Subjective includes:  constipation, urinary leakage with laughing, coughing, sneezing, urinary leakage with exercise, and urinary frequency dyspareunia   Patient Goals        Objective Impairments to address   Pelvic Floor Strength    Power 3/5   Endurance  5   Fast Twitch  8   % relaxation    External Hip Strength        Lumbar Screen          PLAN OF CARE EXPIRATION: 07/08/2025    AUTHORIZATION EXPIRATION:     Date Authorization     Number of visits provided            Date of Service 05/08/2025 5/14/2025 5/21/2025 5/27/2025 6/9 6/30   Visits Used 1 2 3 4 5 6   Visits Remaining         [employer] Created                  Neuro Re-Ed         Urge deferral         Defecation mechanics         Breathing Mechanics         PFM Coordination         PFM Down Training         Internal Cueing                   Ther Ex  Lumbar back screen per hx of right sided symptoms and increased right tone. Note hx of radiculopathy of RLE.  Further back analysis           Repeated Movements   Prone Lumbar Extension   1 x 12 (NE, NW)    Prone lumbar R SG  1 x 10   (Dec, B)    Prone lumbar extension RSG  1  x 10   (Dec, B)            PFM Strengthening         Hip strengthening         Functional Strengthening         Abdominal Strengthening         Dilator Training         Aerobic         Therapeutic Rest Breaks         Mobility          High Impact         UE Strengthening          Lumbar Repeated Movemnts  Standing RSG:  Ne, W    LSG  B, Centralized    Prone  Extension  B, Centralized Prone lumbar extension with RSG  2 x 10 (dec, B) centralization.       Ther Activity         Voiding Diaries         Review of sxs     Performed   30'     Fluid Intake         Education HEP, Pelvic Floor - with external model, PFM      55'    Manual Ther         PFM exam  STM of right PFM and left PFM   23 minutes WinBack STM 10-15% 8 minutes right PFM OI   C/R inbetween. Referral to back. Noted reduction in pain post treatment    Increased time spent further assessing PFM secondary to continued pelvic pain. See objective measures.  PFM stretching 25'     KT taping 5'  KT Taping 5'    Ortho exam   (+) LLE upsplit   Long axis     Performed with RLE as well (no significant change but positive change in LLE)      Dynamometer Testing         Fascial Decompression         Bowel Massage  Bowel Massage: Performed FRANCESCA         Modalities                           Outcome Measure

## 2025-07-08 ENCOUNTER — OFFICE VISIT (OUTPATIENT)
Dept: OBGYN CLINIC | Facility: CLINIC | Age: 40
End: 2025-07-08
Payer: COMMERCIAL

## 2025-07-08 ENCOUNTER — HOSPITAL ENCOUNTER (OUTPATIENT)
Dept: CT IMAGING | Facility: HOSPITAL | Age: 40
Discharge: HOME | End: 2025-07-08
Attending: FAMILY MEDICINE
Payer: COMMERCIAL

## 2025-07-08 VITALS
WEIGHT: 169.2 LBS | HEIGHT: 62 IN | SYSTOLIC BLOOD PRESSURE: 116 MMHG | DIASTOLIC BLOOD PRESSURE: 80 MMHG | BODY MASS INDEX: 31.14 KG/M2

## 2025-07-08 DIAGNOSIS — N80.9 ENDOMETRIOSIS DETERMINED BY LAPAROSCOPY: Primary | ICD-10-CM

## 2025-07-08 DIAGNOSIS — R10.2 PELVIC PAIN: ICD-10-CM

## 2025-07-08 DIAGNOSIS — N94.6 DYSMENORRHEA: ICD-10-CM

## 2025-07-08 DIAGNOSIS — R10.9 ABDOMINAL PAIN, UNSPECIFIED ABDOMINAL LOCATION: ICD-10-CM

## 2025-07-08 PROCEDURE — 74177 CT ABD & PELVIS W/CONTRAST: CPT

## 2025-07-08 PROCEDURE — 99215 OFFICE O/P EST HI 40 MIN: CPT | Performed by: OBSTETRICS & GYNECOLOGY

## 2025-07-08 RX ADMIN — IOHEXOL 100 ML: 350 INJECTION, SOLUTION INTRAVENOUS at 07:21

## 2025-07-08 NOTE — PROGRESS NOTES
"Name: Michaela Ware      : 1985      MRN: [id number]  Encounter Provider: Jaja Cardona MD  Encounter Date: 2025   Encounter department: [facility]  :  Assessment & Plan  Endometriosis determined by laparoscopy  S/p total hysterectomy, bilateral salpingectomy, and L oophorectomy  Continuous combined OCPs not tolerated due to side effects.   Orders:    Elagolix Sodium 150 MG TABS; Take 1 tablet by mouth in the morning    Pelvic pain  See plan under endometriosis  Discussed GnRH antagonist vs agonist treatment for endometriosis. Reviewed more tolerable side effect profile with GnRH antagonist and it's utilization for management of endometriosis for long term management. Reviewed different mechanisms of action between Orilissa and Lupron (GnRh agonist) and treatment uses. Explained side effects and use of Orilissa as long term management of endometriosis. Increase in her hot flashes and night sweats is anticipated, but likely less than with Lupron and surgical menopause. Reviewed Lupron option as a temporary treatment in preparation for surgical intervention. Discussed using Lupron as a light \"test\" of surgical menopause prior to moving forward with removal of her remaining R ovary.     Discussed doing a trial of Orilissa first to see if her symptoms are reduced with a different treatment option of her endometriosis. Will order and follow up in 4 months (anticipating needing prior authorization).   If it provides improvement, discussed using 150mg dosage and still option of increasing to 200mg if further improvement can be done with tolerable side effects. Discussed effect on bone density and if using >2 years, DEXA scan recommended.     If no improvement of symptoms, can do a trial of Lupron injection for 3 months to determine the anticipated side effects of oophorectomy and surgical menopause. With plan for surgical removal of the R ovary if symptoms improving and " menopausal symptoms tolerable.   Discussed need for robotic assistance for oophorectomy due to her significant adhesive disease that was found in her prior surgery.     Patient amenable to plan and will RTO in 4 months.   Orders:    Elagolix Sodium 150 MG TABS; Take 1 tablet by mouth in the morning    Dysmenorrhea    Orders:    Elagolix Sodium 150 MG TABS; Take 1 tablet by mouth in the morning        History of Present Illness   HPI  Michaela Ware is a 40 y.o. female who presents for follow up of persistent pelvic pain and symptoms related to endometriosis. She stopped her combined OCP that she was taking consistently due to increase in hot flashes, night sweats, irritability, and mood lability. Improvement in those symptoms have occurred since discontinuation of the OCP. Her pelvic pain is persistent with abdominal bloating occurring and symptoms mainly in the RLQ. She is noticing a recurrence of her cyclic pelvic pain that she was still having prior to her hysterectomy.     She is considering surgical removal of the remaining R ovary for treatment and wants to discuss all options prior to surgery as well.   History obtained from: patient    Review of Systems   Gastrointestinal:  Positive for abdominal distention, abdominal pain, constipation and diarrhea. Negative for nausea and vomiting.   Genitourinary:  Positive for dyspareunia, menstrual problem and pelvic pain.   Musculoskeletal:  Positive for back pain.     Past Medical History   Past Medical History[1]  Past Surgical History[2]  Family History[3]   reports that she quit smoking about 8 months ago. Her smoking use included cigarettes. She started smoking about 21 years ago. She has a 1 pack-year smoking history. She has been exposed to tobacco smoke. She has never used smokeless tobacco. She reports current alcohol use. She reports that she does not use drugs.  Current Outpatient Medications   Medication Instructions    dicyclomine (BENTYL) 20 mg, Oral, Every  "6 hours    Elagolix Sodium 150 MG TABS 1 tablet, Oral, Daily    lisdexamfetamine (VYVANSE) 30 mg, Oral, Every morning    metFORMIN (GLUCOPHAGE-XR) 500 mg, Oral, Daily with dinner    metroNIDAZOLE (METROCREAM) 0.75 % cream APPLY TOPICALLY 2 TIMES A DAY. APPLY 1 TO 2 TIMES A DAY TO FACE FOR ROSACEA    sertraline (ZOLOFT) 25 mg tablet Start zoloft 25mg tabs: 1/2 tab daily x 1 week, then 1 tab daily x 1 week, then 1 1/2 tabs daily x 1 week, then 2 tabs daily    Synthroid 88 mcg, Oral, Daily   Allergies[4]      Objective   /80 (BP Location: Right arm, Patient Position: Sitting, Cuff Size: Standard)   Ht 5' 2\" (1.575 m)   Wt 76.7 kg (169 lb 3.2 oz)   LMP 12/03/2024 (Exact Date)   BMI 30.95 kg/m²      Physical Exam  Constitutional:       General: She is not in acute distress.     Appearance: Normal appearance. She is not diaphoretic.   HENT:      Head: Normocephalic and atraumatic.   Pulmonary:      Effort: Pulmonary effort is normal. No respiratory distress.     Musculoskeletal:      Right lower leg: No edema.      Left lower leg: No edema.     Neurological:      Mental Status: She is alert and oriented to person, place, and time.     Skin:     General: Skin is warm and dry.      Coloration: Skin is not pale.     Psychiatric:         Mood and Affect: Mood normal.         Behavior: Behavior normal.         Thought Content: Thought content normal.         Judgment: Judgment normal.   Vitals and nursing note reviewed.           Administrative Statements   I have spent a total time of 45 minutes in caring for this patient on the day of the visit/encounter including Diagnostic results, Prognosis, Risks and benefits of tx options, Instructions for management, Patient and family education, Importance of tx compliance, Risk factor reductions, Impressions, Counseling / Coordination of care, Documenting in the medical record, Reviewing/placing orders in the medical record (including tests, medications, and/or procedures), " Obtaining or reviewing history  , and Communicating with other healthcare professionals .       [1]   Past Medical History:  Diagnosis Date    Allergic     Anemia 08/2023    Arthritis     B12 deficiency     Disease of thyroid gland     Endometriosis     Fibroid     GERD (gastroesophageal reflux disease)     H/O: hysterectomy     Hashimoto's disease     HL (hearing loss)     Hypertension     Hypothyroidism     Otitis media     Sjogren's disease (HCC)     Skin tag     Varicella     Verruca    [2]   Past Surgical History:  Procedure Laterality Date    BARIATRIC SURGERY  abdominal lipo    12/2017    COLONOSCOPY      COSMETIC SURGERY      Liposuction    EGD AND COLONOSCOPY N/A 03/25/2016    Procedure: EGD AND COLONOSCOPY;  Surgeon: Aly Abreu MD;  Location:  GI LAB;  Service:     CO CYSTOURETHROSCOPY N/A 12/16/2024    Procedure: CYSTOSCOPY;  Surgeon: Jaja Cardona MD;  Location: [facility];  Service: Gynecology    CO LAPS TOTAL HYSTERECT 250 GM/< W/RMVL TUBE/OVARY N/A 12/16/2024    Procedure: EXAM UNDER ANESTHESIA; HYSTERECTOMY LAPAROSCOPIC TOTAL (LTH) W/ ROBOTICS, EXTENSIVE LYSIS OF ADHESIONS. LEFT SALPINGO-OPHORECTOMY, RIGHT SALPINGECTOMY.;  Surgeon: Jaja Cardona MD;  Location: [facility];  Service: Gynecology    REDUCTION MAMMAPLASTY Bilateral     16 years old   [3]   Family History  Problem Relation Name Age of Onset    Arthritis Mother Soco     Heart attack Father Luis     Heart disease Father Luis     Hypertension Father Luis     Thyroid disease Father Luis     Arthritis Father Luis     Autoimmune disease Father Luis     Hashimoto's thyroiditis Brother      No Known Problems Brother      Thyroid disease Brother E     Autoimmune disease Brother E     Dementia Maternal Grandmother Cristal     Diabetes Maternal Grandmother Cristal     Asthma Maternal Grandmother Cristal     Arthritis Maternal Grandmother Cristal     Lymphoma Maternal Grandfather Huber     Cancer Maternal  Grandfather Huber     Alzheimer's disease Paternal Grandmother F     Arthritis Paternal Grandmother F     Deep vein thrombosis Paternal Grandfather Meño     Heart attack Paternal Grandfather Meño     Clotting disorder Paternal Grandfather Meño     Stroke Paternal Grandfather Meño     Stroke Paternal Aunt Mirmiam     Stroke Paternal Aunt Angelika     Autoimmune disease Paternal Aunt Edwige     Cancer Paternal Uncle Marck     Breast cancer Cousin Paternal Cousin- Rosalba     Colon cancer Neg Hx      Ovarian cancer Neg Hx     [4]   Allergies  Allergen Reactions    Peanut Oil - Food Allergy Cough and Other (See Comments)     oral    Peppermint Oil - Food Allergy Cough, Hives, Itching, Other (See Comments), Rash and Swelling     Sneezing.

## 2025-07-09 ENCOUNTER — TELEPHONE (OUTPATIENT)
Dept: FAMILY MEDICINE CLINIC | Facility: CLINIC | Age: 40
End: 2025-07-09

## 2025-07-11 ENCOUNTER — CLINICAL SUPPORT (OUTPATIENT)
Dept: FAMILY MEDICINE CLINIC | Facility: CLINIC | Age: 40
End: 2025-07-11
Payer: COMMERCIAL

## 2025-07-11 DIAGNOSIS — E53.8 B12 DEFICIENCY: Primary | ICD-10-CM

## 2025-07-11 PROCEDURE — 96372 THER/PROPH/DIAG INJ SC/IM: CPT | Performed by: FAMILY MEDICINE

## 2025-07-11 RX ADMIN — CYANOCOBALAMIN 1000 MCG: 1000 INJECTION, SOLUTION INTRAMUSCULAR; SUBCUTANEOUS at 08:35

## 2025-07-12 NOTE — ASSESSMENT & PLAN NOTE
"See plan under endometriosis  Discussed GnRH antagonist vs agonist treatment for endometriosis. Reviewed more tolerable side effect profile with GnRH antagonist and it's utilization for management of endometriosis for long term management. Reviewed different mechanisms of action between Orilissa and Lupron (GnRh agonist) and treatment uses. Explained side effects and use of Orilissa as long term management of endometriosis. Increase in her hot flashes and night sweats is anticipated, but likely less than with Lupron and surgical menopause. Reviewed Lupron option as a temporary treatment in preparation for surgical intervention. Discussed using Lupron as a light \"test\" of surgical menopause prior to moving forward with removal of her remaining R ovary.     Discussed doing a trial of Orilissa first to see if her symptoms are reduced with a different treatment option of her endometriosis. Will order and follow up in 4 months (anticipating needing prior authorization).   If it provides improvement, discussed using 150mg dosage and still option of increasing to 200mg if further improvement can be done with tolerable side effects. Discussed effect on bone density and if using >2 years, DEXA scan recommended.     If no improvement of symptoms, can do a trial of Lupron injection for 3 months to determine the anticipated side effects of oophorectomy and surgical menopause. With plan for surgical removal of the R ovary if symptoms improving and menopausal symptoms tolerable.   Discussed need for robotic assistance for oophorectomy due to her significant adhesive disease that was found in her prior surgery.     Patient amenable to plan and will RTO in 4 months.   Orders:    Elagolix Sodium 150 MG TABS; Take 1 tablet by mouth in the morning    "

## 2025-07-12 NOTE — ASSESSMENT & PLAN NOTE
S/p total hysterectomy, bilateral salpingectomy, and L oophorectomy  Continuous combined OCPs not tolerated due to side effects.   Orders:    Elagolix Sodium 150 MG TABS; Take 1 tablet by mouth in the morning

## 2025-07-14 ENCOUNTER — HOSPITAL ENCOUNTER (OUTPATIENT)
Dept: INFUSION CENTER | Facility: CLINIC | Age: 40
Discharge: HOME | End: 2025-07-14
Attending: FAMILY MEDICINE
Payer: COMMERCIAL

## 2025-07-14 ENCOUNTER — TELEPHONE (OUTPATIENT)
Age: 40
End: 2025-07-14

## 2025-07-14 VITALS
SYSTOLIC BLOOD PRESSURE: 140 MMHG | HEART RATE: 78 BPM | DIASTOLIC BLOOD PRESSURE: 92 MMHG | OXYGEN SATURATION: 98 % | TEMPERATURE: 97.4 F

## 2025-07-14 DIAGNOSIS — E61.1 IRON DEFICIENCY: Primary | ICD-10-CM

## 2025-07-14 PROCEDURE — 96365 THER/PROPH/DIAG IV INF INIT: CPT

## 2025-07-14 RX ORDER — SODIUM CHLORIDE 9 MG/ML
20 INJECTION, SOLUTION INTRAVENOUS ONCE
Status: COMPLETED | OUTPATIENT
Start: 2025-07-14 | End: 2025-07-14

## 2025-07-14 RX ORDER — SODIUM CHLORIDE 9 MG/ML
20 INJECTION, SOLUTION INTRAVENOUS ONCE
Status: CANCELLED | OUTPATIENT
Start: 2025-07-21

## 2025-07-14 RX ADMIN — SODIUM CHLORIDE 20 ML/HR: 0.9 INJECTION, SOLUTION INTRAVENOUS at 15:20

## 2025-07-14 RX ADMIN — IRON SUCROSE 200 MG: 20 INJECTION, SOLUTION INTRAVENOUS at 15:18

## 2025-07-14 NOTE — TELEPHONE ENCOUNTER
PA for Elogolix Sodium 150 mg SUBMITTED to [facility]    via    []CMM-KEY:   [x]Surescripts-Case ID #   []Availity-Auth ID # NDC #   []Faxed to plan   []Other website   []Phone call Case ID #     []PA sent as URGENT    All office notes, labs and other pertaining documents and studies sent. Clinical questions answered. Awaiting determination from insurance company.     Turnaround time for your insurance to make a decision on your Prior Authorization can take 7-21 business days.

## 2025-07-14 NOTE — PROGRESS NOTES
Pt to clinic for venofer infusion. pt tolerated without complications. Next appointment July 21 at 2:30

## 2025-07-18 NOTE — TELEPHONE ENCOUNTER
PA for Elogolix Sodium 150 mg DENIED    Reason:(Screenshot if applicable)        Message sent to office clinical pool No office already appealed denial     Denial letter scanned into Media No      We can gladly do an appeal but the process can take about 30-60 days to provide determination. Please have the office staff schedule a Peer to Peer at phone  . If an appeal is truly warranted please have Provider send clinical documentation to the PA department to support the appeal.     **Please follow up with your patient regarding denial and next steps**

## 2025-07-21 ENCOUNTER — HOSPITAL ENCOUNTER (OUTPATIENT)
Dept: INFUSION CENTER | Facility: CLINIC | Age: 40
Discharge: HOME | End: 2025-07-21
Attending: FAMILY MEDICINE
Payer: COMMERCIAL

## 2025-07-21 VITALS
SYSTOLIC BLOOD PRESSURE: 143 MMHG | DIASTOLIC BLOOD PRESSURE: 99 MMHG | TEMPERATURE: 97.6 F | OXYGEN SATURATION: 99 % | HEART RATE: 79 BPM | RESPIRATION RATE: 18 BRPM

## 2025-07-21 DIAGNOSIS — E61.1 IRON DEFICIENCY: Primary | ICD-10-CM

## 2025-07-21 PROCEDURE — 96365 THER/PROPH/DIAG IV INF INIT: CPT

## 2025-07-21 RX ORDER — SODIUM CHLORIDE 9 MG/ML
20 INJECTION, SOLUTION INTRAVENOUS ONCE
OUTPATIENT
Start: 2025-07-28

## 2025-07-21 RX ORDER — SODIUM CHLORIDE 9 MG/ML
20 INJECTION, SOLUTION INTRAVENOUS ONCE
Status: COMPLETED | OUTPATIENT
Start: 2025-07-21 | End: 2025-07-21

## 2025-07-21 RX ADMIN — SODIUM CHLORIDE 20 ML/HR: 0.9 INJECTION, SOLUTION INTRAVENOUS at 16:14

## 2025-07-21 RX ADMIN — IRON SUCROSE 200 MG: 20 INJECTION, SOLUTION INTRAVENOUS at 16:12

## 2025-07-24 DIAGNOSIS — F90.9 ATTENTION DEFICIT HYPERACTIVITY DISORDER (ADHD), UNSPECIFIED ADHD TYPE: ICD-10-CM

## 2025-07-25 RX ORDER — LISDEXAMFETAMINE DIMESYLATE 30 MG/1
30 CAPSULE ORAL EVERY MORNING
Qty: 30 CAPSULE | Refills: 0 | Status: SHIPPED | OUTPATIENT
Start: 2025-07-25

## 2025-07-28 ENCOUNTER — HOSPITAL ENCOUNTER (OUTPATIENT)
Dept: INFUSION CENTER | Facility: CLINIC | Age: 40
Discharge: HOME | End: 2025-07-28
Attending: FAMILY MEDICINE
Payer: COMMERCIAL

## 2025-07-28 VITALS
SYSTOLIC BLOOD PRESSURE: 126 MMHG | RESPIRATION RATE: 18 BRPM | DIASTOLIC BLOOD PRESSURE: 87 MMHG | HEART RATE: 86 BPM | OXYGEN SATURATION: 99 % | TEMPERATURE: 97.5 F

## 2025-07-28 DIAGNOSIS — E61.1 IRON DEFICIENCY: Primary | ICD-10-CM

## 2025-07-28 PROCEDURE — 96365 THER/PROPH/DIAG IV INF INIT: CPT

## 2025-07-28 RX ORDER — SODIUM CHLORIDE 9 MG/ML
20 INJECTION, SOLUTION INTRAVENOUS ONCE
Status: CANCELLED | OUTPATIENT
Start: 2025-08-04

## 2025-07-28 RX ORDER — SODIUM CHLORIDE 9 MG/ML
20 INJECTION, SOLUTION INTRAVENOUS ONCE
Status: COMPLETED | OUTPATIENT
Start: 2025-07-28 | End: 2025-07-28

## 2025-07-28 RX ADMIN — IRON SUCROSE 200 MG: 20 INJECTION, SOLUTION INTRAVENOUS at 16:01

## 2025-07-28 RX ADMIN — SODIUM CHLORIDE 20 ML/HR: 0.9 INJECTION, SOLUTION INTRAVENOUS at 16:01

## 2025-08-04 ENCOUNTER — HOSPITAL ENCOUNTER (OUTPATIENT)
Dept: INFUSION CENTER | Facility: CLINIC | Age: 40
Discharge: HOME | End: 2025-08-04
Attending: FAMILY MEDICINE
Payer: COMMERCIAL

## 2025-08-04 VITALS
DIASTOLIC BLOOD PRESSURE: 96 MMHG | SYSTOLIC BLOOD PRESSURE: 144 MMHG | RESPIRATION RATE: 18 BRPM | HEART RATE: 90 BPM | OXYGEN SATURATION: 98 % | TEMPERATURE: 96.8 F

## 2025-08-04 DIAGNOSIS — E61.1 IRON DEFICIENCY: Primary | ICD-10-CM

## 2025-08-04 PROCEDURE — 96365 THER/PROPH/DIAG IV INF INIT: CPT

## 2025-08-04 RX ORDER — SODIUM CHLORIDE 9 MG/ML
20 INJECTION, SOLUTION INTRAVENOUS ONCE
Status: CANCELLED | OUTPATIENT
Start: 2025-08-11

## 2025-08-04 RX ORDER — SODIUM CHLORIDE 9 MG/ML
20 INJECTION, SOLUTION INTRAVENOUS ONCE
Status: COMPLETED | OUTPATIENT
Start: 2025-08-04 | End: 2025-08-04

## 2025-08-04 RX ADMIN — IRON SUCROSE 200 MG: 20 INJECTION, SOLUTION INTRAVENOUS at 16:05

## 2025-08-04 RX ADMIN — SODIUM CHLORIDE 20 ML/HR: 0.9 INJECTION, SOLUTION INTRAVENOUS at 15:55

## 2025-08-11 ENCOUNTER — HOSPITAL ENCOUNTER (OUTPATIENT)
Dept: INFUSION CENTER | Facility: CLINIC | Age: 40
Discharge: HOME | End: 2025-08-11
Attending: FAMILY MEDICINE
Payer: COMMERCIAL

## 2025-08-13 ENCOUNTER — OFFICE VISIT (OUTPATIENT)
Dept: FAMILY MEDICINE CLINIC | Facility: CLINIC | Age: 40
End: 2025-08-13
Payer: COMMERCIAL

## 2025-08-13 PROBLEM — D51.0 PERNICIOUS ANEMIA: Status: ACTIVE | Noted: 2025-08-13

## 2025-08-18 ENCOUNTER — PATIENT MESSAGE (OUTPATIENT)
Dept: OBGYN CLINIC | Facility: CLINIC | Age: 40
End: 2025-08-18

## 2025-08-18 DIAGNOSIS — N80.9 ENDOMETRIOSIS DETERMINED BY LAPAROSCOPY: ICD-10-CM

## 2025-08-18 DIAGNOSIS — R10.2 PELVIC PAIN: ICD-10-CM

## 2025-08-18 DIAGNOSIS — N83.01 FOLLICULAR CYST OF RIGHT OVARY: Primary | ICD-10-CM

## 2025-08-18 DIAGNOSIS — N94.6 DYSMENORRHEA: ICD-10-CM

## 2025-08-19 ENCOUNTER — NURSE TRIAGE (OUTPATIENT)
Age: 40
End: 2025-08-19

## (undated) DEVICE — 2, DISPOSABLE SUCTION/IRRIGATOR WITHOUT DISPOSABLE TIP: Brand: STRYKEFLOW

## (undated) DEVICE — ENDOPATH PNEUMONEEDLE INSUFFLATION NEEDLES WITH LUER LOCK CONNECTORS 150MM: Brand: ENDOPATH

## (undated) DEVICE — COLUMN DRAPE

## (undated) DEVICE — GLOVE INDICATOR PI UNDERGLOVE SZ 6.5 BLUE

## (undated) DEVICE — CYSTO TUBING SINGLE IRRIGATION

## (undated) DEVICE — SUT VICRYL 0 UR-6 27 IN J603H

## (undated) DEVICE — SUT VICRYL 0 CT-1 27 IN J340H

## (undated) DEVICE — LAPAROTOMY DRAPE WITH POUCHES: Brand: CONVERTORS

## (undated) DEVICE — SUT MONOCRYL 4-0 PS-2 27 IN Y426H

## (undated) DEVICE — VISUALIZATION SYSTEM: Brand: CLEARIFY

## (undated) DEVICE — TUBING SUCTION 5MM X 12 FT

## (undated) DEVICE — SUT STRATAFIX SPIRAL 2-0 CT-1 30 CM SXPP1B410

## (undated) DEVICE — 1820 FOAM BLOCK NEEDLE COUNTER: Brand: DEVON

## (undated) DEVICE — VESSEL SEALER EXTEND: Brand: ENDOWRIST

## (undated) DEVICE — CHLORHEXIDINE 4PCT 4 OZ

## (undated) DEVICE — ARM DRAPE

## (undated) DEVICE — SPONGE 4 X 4 XRAY 16 PLY STRL LF RFD

## (undated) DEVICE — LARGE NEEDLE DRIVER: Brand: ENDOWRIST

## (undated) DEVICE — TROCAR: Brand: KII FIOS FIRST ENTRY

## (undated) DEVICE — DECANTER: Brand: UNBRANDED

## (undated) DEVICE — BLADELESS OBTURATOR: Brand: WECK VISTA

## (undated) DEVICE — UTERINE MANIPULATOR RUMI 6.7 X 8 CM

## (undated) DEVICE — DRAPE TOWEL: Brand: CONVERTORS

## (undated) DEVICE — SYRINGE 50ML LL

## (undated) DEVICE — 40595 XL TRENDELENBURG POSITIONING KIT: Brand: 40595 XL TRENDELENBURG POSITIONING KIT

## (undated) DEVICE — ASTOUND STANDARD SURGICAL GOWN, XL: Brand: CONVERTORS

## (undated) DEVICE — CANNULA SEAL

## (undated) DEVICE — OCCLUDER COLPO-PNEUMO

## (undated) DEVICE — EXOFIN PRECISION PEN HIGH VISCOSITY TOPICAL SKIN ADHESIVE: Brand: EXOFIN PRECISION PEN, 1G

## (undated) DEVICE — PREMIUM DRY TRAY LF: Brand: MEDLINE INDUSTRIES, INC.

## (undated) DEVICE — MONOPOLAR CURVED SCISSORS: Brand: ENDOWRIST

## (undated) DEVICE — TROCAR: Brand: KII SHIELDED BLADED ACCESS SYSTEM

## (undated) DEVICE — PROGRASP FORCEPS: Brand: ENDOWRIST

## (undated) DEVICE — INTENDED FOR TISSUE SEPARATION, AND OTHER PROCEDURES THAT REQUIRE A SHARP SURGICAL BLADE TO PUNCTURE OR CUT.: Brand: BARD-PARKER SAFETY BLADES SIZE 11, STERILE

## (undated) DEVICE — TRAY FOLEY 16FR URIMETER SILICONE SURESTEP

## (undated) DEVICE — GLOVE SRG BIOGEL 6.5

## (undated) DEVICE — MAYO STAND COVER: Brand: CONVERTORS

## (undated) DEVICE — TISSUE RETRIEVAL SYSTEM: Brand: INZII RETRIEVAL SYSTEM

## (undated) DEVICE — BETHLEHEM UNIVERSAL GYN LAP PK: Brand: CARDINAL HEALTH

## (undated) DEVICE — HEAVY DUTY TABLE COVER: Brand: CONVERTORS

## (undated) DEVICE — TOWEL SURG XR DETECT GREEN STRL RFD

## (undated) DEVICE — TIP COVER ACCESSORY